# Patient Record
Sex: MALE | Race: WHITE | Employment: FULL TIME | URBAN - NONMETROPOLITAN AREA
[De-identification: names, ages, dates, MRNs, and addresses within clinical notes are randomized per-mention and may not be internally consistent; named-entity substitution may affect disease eponyms.]

---

## 2018-08-24 ENCOUNTER — APPOINTMENT (OUTPATIENT)
Dept: GENERAL RADIOLOGY | Age: 63
DRG: 492 | End: 2018-08-24
Payer: COMMERCIAL

## 2018-08-24 ENCOUNTER — HOSPITAL ENCOUNTER (INPATIENT)
Age: 63
LOS: 6 days | Discharge: NURSING, REHAB OR LONG-TERM CARE | DRG: 492 | End: 2018-08-30
Attending: EMERGENCY MEDICINE | Admitting: SURGERY
Payer: COMMERCIAL
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
## 2018-08-24 PROBLEM — S82.202B OPEN LEFT TIBIAL FRACTURE: Status: ACTIVE | Noted: 2018-08-24

## 2018-08-24 PROBLEM — S92.061B: Status: ACTIVE | Noted: 2018-08-24

## 2018-08-24 PROBLEM — Z79.01 ANTICOAGULATED ON COUMADIN: Status: ACTIVE | Noted: 2018-08-24

## 2018-08-24 PROBLEM — S82.832B: Status: ACTIVE | Noted: 2018-08-24

## 2018-08-24 LAB
ABO: NORMAL
ALBUMIN SERPL-MCNC: 4.2 G/DL (ref 3.5–5.1)
ALP BLD-CCNC: 59 U/L (ref 38–126)
ALT SERPL-CCNC: 26 U/L (ref 11–66)
ANGLE TEG: 70.7 DEG (ref 53–72)
ANION GAP SERPL CALCULATED.3IONS-SCNC: 14 MEQ/L (ref 8–16)
ANTIBODY SCREEN: NORMAL
APTT: 28.1 SECONDS (ref 22–38)
AST SERPL-CCNC: 35 U/L (ref 5–40)
BASOPHILS # BLD: 0.5 %
BASOPHILS ABSOLUTE: 0 THOU/MM3 (ref 0–0.1)
BILIRUB SERPL-MCNC: 0.5 MG/DL (ref 0.3–1.2)
BUN BLDV-MCNC: 41 MG/DL (ref 7–22)
CALCIUM SERPL-MCNC: 8.8 MG/DL (ref 8.5–10.5)
CHLORIDE BLD-SCNC: 104 MEQ/L (ref 98–111)
CO2: 22 MEQ/L (ref 23–33)
CREAT SERPL-MCNC: 1.4 MG/DL (ref 0.4–1.2)
EOSINOPHIL # BLD: 1.2 %
EOSINOPHILS ABSOLUTE: 0.1 THOU/MM3 (ref 0–0.4)
EPL-TEG: 0 %
ERYTHROCYTE [DISTWIDTH] IN BLOOD BY AUTOMATED COUNT: 12.2 % (ref 11.5–14.5)
ERYTHROCYTE [DISTWIDTH] IN BLOOD BY AUTOMATED COUNT: 40 FL (ref 35–45)
ETHYL ALCOHOL, SERUM: < 0.01 %
GFR SERPL CREATININE-BSD FRML MDRD: 51 ML/MIN/1.73M2
GLUCOSE BLD-MCNC: 101 MG/DL (ref 70–108)
HCT VFR BLD CALC: 40 % (ref 42–52)
HEMOGLOBIN: 13.8 GM/DL (ref 14–18)
HEPARIN THERAPY: NO
IMMATURE GRANS (ABS): 0 THOU/MM3 (ref 0–0.07)
IMMATURE GRANULOCYTES: 0 %
INHIBITION AA TEG: 0 %
INHIBITION ADP TEG: 0 %
INR BLD: 1.21 (ref 0.85–1.13)
KINETICS TEG: 1.5 MINUTES (ref 1–3)
LACTIC ACID: 1.4 MMOL/L (ref 0.5–2.2)
LY30 (LYSIS) TEG: 0 % (ref 0–7.5)
LYMPHOCYTES # BLD: 31.8 %
LYMPHOCYTES ABSOLUTE: 1.8 THOU/MM3 (ref 1–4.8)
MA (MAX CLOT) TEG: 61.1 MM (ref 50–70)
MA(AA) TEG: 64.4 MM
MA(ACTIVATED) TEG: 5.1 MM
MA(ADP) TEG: 66 MM
MCH RBC QN AUTO: 31.2 PG (ref 26–33)
MCHC RBC AUTO-ENTMCNC: 34.5 GM/DL (ref 32.2–35.5)
MCV RBC AUTO: 90.3 FL (ref 80–94)
MONOCYTES # BLD: 13.2 %
MONOCYTES ABSOLUTE: 0.8 THOU/MM3 (ref 0.4–1.3)
NUCLEATED RED BLOOD CELLS: 0 /100 WBC
OSMOLALITY CALCULATION: 289.7 MOSMOL/KG (ref 275–300)
PLATELET # BLD: 217 THOU/MM3 (ref 130–400)
PMV BLD AUTO: 8.9 FL (ref 9.4–12.4)
POTASSIUM SERPL-SCNC: 4.8 MEQ/L (ref 3.5–5.2)
RBC # BLD: 4.43 MILL/MM3 (ref 4.7–6.1)
REACTION TIME TEG: 2.7 MINUTES (ref 5–10)
RH FACTOR: NORMAL
SEG NEUTROPHILS: 53.3 %
SEGMENTED NEUTROPHILS ABSOLUTE COUNT: 3.1 THOU/MM3 (ref 1.8–7.7)
SODIUM BLD-SCNC: 140 MEQ/L (ref 135–145)
TOTAL PROTEIN: 6.7 G/DL (ref 6.1–8)
WBC # BLD: 5.8 THOU/MM3 (ref 4.8–10.8)

## 2018-08-24 PROCEDURE — 86900 BLOOD TYPING SEROLOGIC ABO: CPT

## 2018-08-24 PROCEDURE — 96375 TX/PRO/DX INJ NEW DRUG ADDON: CPT

## 2018-08-24 PROCEDURE — 2580000003 HC RX 258

## 2018-08-24 PROCEDURE — 85576 BLOOD PLATELET AGGREGATION: CPT

## 2018-08-24 PROCEDURE — 80053 COMPREHEN METABOLIC PANEL: CPT

## 2018-08-24 PROCEDURE — 86850 RBC ANTIBODY SCREEN: CPT

## 2018-08-24 PROCEDURE — 99222 1ST HOSP IP/OBS MODERATE 55: CPT | Performed by: SURGERY

## 2018-08-24 PROCEDURE — 85730 THROMBOPLASTIN TIME PARTIAL: CPT

## 2018-08-24 PROCEDURE — 2580000003 HC RX 258: Performed by: EMERGENCY MEDICINE

## 2018-08-24 PROCEDURE — 85610 PROTHROMBIN TIME: CPT

## 2018-08-24 PROCEDURE — 96374 THER/PROPH/DIAG INJ IV PUSH: CPT

## 2018-08-24 PROCEDURE — G0480 DRUG TEST DEF 1-7 CLASSES: HCPCS

## 2018-08-24 PROCEDURE — 6360000002 HC RX W HCPCS

## 2018-08-24 PROCEDURE — 96376 TX/PRO/DX INJ SAME DRUG ADON: CPT

## 2018-08-24 PROCEDURE — 73590 X-RAY EXAM OF LOWER LEG: CPT

## 2018-08-24 PROCEDURE — 36415 COLL VENOUS BLD VENIPUNCTURE: CPT

## 2018-08-24 PROCEDURE — 85025 COMPLETE CBC W/AUTO DIFF WBC: CPT

## 2018-08-24 PROCEDURE — 1200000003 HC TELEMETRY R&B

## 2018-08-24 PROCEDURE — 73600 X-RAY EXAM OF ANKLE: CPT

## 2018-08-24 PROCEDURE — 99285 EMERGENCY DEPT VISIT HI MDM: CPT

## 2018-08-24 PROCEDURE — 86901 BLOOD TYPING SEROLOGIC RH(D): CPT

## 2018-08-24 PROCEDURE — 6360000002 HC RX W HCPCS: Performed by: EMERGENCY MEDICINE

## 2018-08-24 PROCEDURE — 90715 TDAP VACCINE 7 YRS/> IM: CPT | Performed by: NURSE PRACTITIONER

## 2018-08-24 PROCEDURE — 83605 ASSAY OF LACTIC ACID: CPT

## 2018-08-24 PROCEDURE — 73620 X-RAY EXAM OF FOOT: CPT

## 2018-08-24 PROCEDURE — 6360000002 HC RX W HCPCS: Performed by: NURSE PRACTITIONER

## 2018-08-24 PROCEDURE — APPSS180 APP SPLIT SHARED TIME > 60 MINUTES: Performed by: NURSE PRACTITIONER

## 2018-08-24 PROCEDURE — 6820000001 HC L2 TRAUMA SURGERY EVALUATION

## 2018-08-24 PROCEDURE — 90471 IMMUNIZATION ADMIN: CPT | Performed by: NURSE PRACTITIONER

## 2018-08-24 PROCEDURE — 81003 URINALYSIS AUTO W/O SCOPE: CPT

## 2018-08-24 PROCEDURE — 93005 ELECTROCARDIOGRAM TRACING: CPT | Performed by: EMERGENCY MEDICINE

## 2018-08-24 RX ORDER — WARFARIN SODIUM 7.5 MG/1
5 TABLET ORAL
COMMUNITY

## 2018-08-24 RX ORDER — FENTANYL CITRATE 50 UG/ML
50 INJECTION, SOLUTION INTRAMUSCULAR; INTRAVENOUS
Status: COMPLETED | OUTPATIENT
Start: 2018-08-24 | End: 2018-08-24

## 2018-08-24 RX ORDER — FENTANYL CITRATE 50 UG/ML
INJECTION, SOLUTION INTRAMUSCULAR; INTRAVENOUS
Status: COMPLETED
Start: 2018-08-24 | End: 2018-08-24

## 2018-08-24 RX ORDER — DEXTROSE MONOHYDRATE 50 MG/ML
INJECTION, SOLUTION INTRAVENOUS
Status: COMPLETED
Start: 2018-08-24 | End: 2018-08-24

## 2018-08-24 RX ORDER — CEFAZOLIN SODIUM 1 G/3ML
INJECTION, POWDER, FOR SOLUTION INTRAMUSCULAR; INTRAVENOUS
Status: COMPLETED
Start: 2018-08-24 | End: 2018-08-24

## 2018-08-24 RX ADMIN — PHYTONADIONE 10 MG: 10 INJECTION, EMULSION INTRAMUSCULAR; INTRAVENOUS; SUBCUTANEOUS at 22:59

## 2018-08-24 RX ADMIN — FENTANYL CITRATE 50 MCG: 50 INJECTION INTRAMUSCULAR; INTRAVENOUS at 22:49

## 2018-08-24 RX ADMIN — TETANUS TOXOID, REDUCED DIPHTHERIA TOXOID AND ACELLULAR PERTUSSIS VACCINE, ADSORBED 0.5 ML: 5; 2.5; 8; 8; 2.5 SUSPENSION INTRAMUSCULAR at 22:13

## 2018-08-24 RX ADMIN — FENTANYL CITRATE 50 MCG: 50 INJECTION INTRAMUSCULAR; INTRAVENOUS at 23:36

## 2018-08-24 RX ADMIN — CEFAZOLIN 1000 MG: 330 INJECTION, POWDER, FOR SOLUTION INTRAMUSCULAR; INTRAVENOUS at 22:02

## 2018-08-24 RX ADMIN — DEXTROSE MONOHYDRATE 50 ML: 50 INJECTION, SOLUTION INTRAVENOUS at 22:02

## 2018-08-24 RX ADMIN — FENTANYL CITRATE 50 MCG: 50 INJECTION INTRAMUSCULAR; INTRAVENOUS at 22:13

## 2018-08-24 ASSESSMENT — ENCOUNTER SYMPTOMS
EYE PAIN: 0
APNEA: 0
SHORTNESS OF BREATH: 0
EYE REDNESS: 0
COLOR CHANGE: 0
CONSTIPATION: 0
WHEEZING: 0
TROUBLE SWALLOWING: 0
SORE THROAT: 0
EYE DISCHARGE: 0
VOMITING: 0
CHOKING: 0
ABDOMINAL DISTENTION: 0
ABDOMINAL PAIN: 0
DIARRHEA: 0
BACK PAIN: 0
BLOOD IN STOOL: 0
COUGH: 0
STRIDOR: 0
EYE ITCHING: 0
NAUSEA: 0
PHOTOPHOBIA: 0
RHINORRHEA: 0
FACIAL SWELLING: 0
SINUS PRESSURE: 0
CHEST TIGHTNESS: 0
VOICE CHANGE: 0

## 2018-08-24 ASSESSMENT — PAIN DESCRIPTION - LOCATION: LOCATION: ANKLE

## 2018-08-24 ASSESSMENT — PAIN SCALES - GENERAL
PAINLEVEL_OUTOF10: 9
PAINLEVEL_OUTOF10: 7
PAINLEVEL_OUTOF10: 6
PAINLEVEL_OUTOF10: 8

## 2018-08-24 ASSESSMENT — PAIN DESCRIPTION - ORIENTATION: ORIENTATION: LEFT;RIGHT

## 2018-08-25 ENCOUNTER — ANESTHESIA EVENT (OUTPATIENT)
Dept: OPERATING ROOM | Age: 63
DRG: 492 | End: 2018-08-25
Payer: COMMERCIAL

## 2018-08-25 ENCOUNTER — APPOINTMENT (OUTPATIENT)
Dept: GENERAL RADIOLOGY | Age: 63
DRG: 492 | End: 2018-08-25
Payer: COMMERCIAL

## 2018-08-25 VITALS
RESPIRATION RATE: 3 BRPM | SYSTOLIC BLOOD PRESSURE: 103 MMHG | OXYGEN SATURATION: 100 % | DIASTOLIC BLOOD PRESSURE: 58 MMHG

## 2018-08-25 PROBLEM — N17.9 AKI (ACUTE KIDNEY INJURY) (HCC): Status: ACTIVE | Noted: 2018-08-25

## 2018-08-25 LAB
AMPHETAMINE+METHAMPHETAMINE URINE SCREEN: NEGATIVE
ANION GAP SERPL CALCULATED.3IONS-SCNC: 14 MEQ/L (ref 8–16)
BARBITURATE QUANTITATIVE URINE: NEGATIVE
BASOPHILS # BLD: 0.2 %
BASOPHILS ABSOLUTE: 0 THOU/MM3 (ref 0–0.1)
BENZODIAZEPINE QUANTITATIVE URINE: NEGATIVE
BILIRUBIN URINE: NEGATIVE
BLOOD, URINE: NEGATIVE
BUN BLDV-MCNC: 35 MG/DL (ref 7–22)
CALCIUM SERPL-MCNC: 8.2 MG/DL (ref 8.5–10.5)
CANNABINOID QUANTITATIVE URINE: NEGATIVE
CHARACTER, URINE: CLEAR
CHLORIDE BLD-SCNC: 105 MEQ/L (ref 98–111)
CO2: 19 MEQ/L (ref 23–33)
COCAINE METABOLITE QUANTITATIVE URINE: NEGATIVE
COLOR: YELLOW
CREAT SERPL-MCNC: 1.2 MG/DL (ref 0.4–1.2)
EKG ATRIAL RATE: 72 BPM
EKG ATRIAL RATE: 74 BPM
EKG P AXIS: 58 DEGREES
EKG P AXIS: 87 DEGREES
EKG P-R INTERVAL: 132 MS
EKG P-R INTERVAL: 138 MS
EKG Q-T INTERVAL: 390 MS
EKG Q-T INTERVAL: 392 MS
EKG QRS DURATION: 90 MS
EKG QRS DURATION: 94 MS
EKG QTC CALCULATION (BAZETT): 427 MS
EKG QTC CALCULATION (BAZETT): 435 MS
EKG R AXIS: -5 DEGREES
EKG R AXIS: 0 DEGREES
EKG T AXIS: -8 DEGREES
EKG T AXIS: 20 DEGREES
EKG VENTRICULAR RATE: 72 BPM
EKG VENTRICULAR RATE: 74 BPM
EOSINOPHIL # BLD: 0.1 %
EOSINOPHILS ABSOLUTE: 0 THOU/MM3 (ref 0–0.4)
ERYTHROCYTE [DISTWIDTH] IN BLOOD BY AUTOMATED COUNT: 12.3 % (ref 11.5–14.5)
ERYTHROCYTE [DISTWIDTH] IN BLOOD BY AUTOMATED COUNT: 42.2 FL (ref 35–45)
GFR SERPL CREATININE-BSD FRML MDRD: 61 ML/MIN/1.73M2
GLUCOSE BLD-MCNC: 106 MG/DL (ref 70–108)
GLUCOSE, URINE: NEGATIVE MG/DL
HCT VFR BLD CALC: 34.7 % (ref 42–52)
HEMOGLOBIN: 11.5 GM/DL (ref 14–18)
IMMATURE GRANS (ABS): 0.02 THOU/MM3 (ref 0–0.07)
IMMATURE GRANULOCYTES: 0.2 %
INR BLD: 1.25 (ref 0.85–1.13)
KETONES, URINE: 40
LEUKOCYTE EST, POC: NEGATIVE
LYMPHOCYTES # BLD: 13 %
LYMPHOCYTES ABSOLUTE: 1.1 THOU/MM3 (ref 1–4.8)
MCH RBC QN AUTO: 31.1 PG (ref 26–33)
MCHC RBC AUTO-ENTMCNC: 33.1 GM/DL (ref 32.2–35.5)
MCV RBC AUTO: 93.8 FL (ref 80–94)
MONOCYTES # BLD: 10.3 %
MONOCYTES ABSOLUTE: 0.8 THOU/MM3 (ref 0.4–1.3)
NITRITE, URINE: NEGATIVE
NUCLEATED RED BLOOD CELLS: 0 /100 WBC
OPIATES, URINE: NEGATIVE
OXYCODONE: NEGATIVE
PH UA: 5
PHENCYCLIDINE QUANTITATIVE URINE: NEGATIVE
PLATELET # BLD: 198 THOU/MM3 (ref 130–400)
PMV BLD AUTO: 9.5 FL (ref 9.4–12.4)
POTASSIUM REFLEX MAGNESIUM: 4.7 MEQ/L (ref 3.5–5.2)
PROTEIN UA: NEGATIVE MG/DL
RBC # BLD: 3.7 MILL/MM3 (ref 4.7–6.1)
SEG NEUTROPHILS: 76.2 %
SEGMENTED NEUTROPHILS ABSOLUTE COUNT: 6.2 THOU/MM3 (ref 1.8–7.7)
SODIUM BLD-SCNC: 138 MEQ/L (ref 135–145)
SPECIFIC GRAVITY UA: 1.02 (ref 1–1.03)
TOTAL CK: 566 U/L (ref 55–170)
TROPONIN T: < 0.01 NG/ML
UROBILINOGEN, URINE: 0.2 EU/DL
WBC # BLD: 8.1 THOU/MM3 (ref 4.8–10.8)

## 2018-08-25 PROCEDURE — 36415 COLL VENOUS BLD VENIPUNCTURE: CPT

## 2018-08-25 PROCEDURE — 6370000000 HC RX 637 (ALT 250 FOR IP): Performed by: SURGERY

## 2018-08-25 PROCEDURE — 3700000001 HC ADD 15 MINUTES (ANESTHESIA): Performed by: PODIATRIST

## 2018-08-25 PROCEDURE — 6360000002 HC RX W HCPCS: Performed by: STUDENT IN AN ORGANIZED HEALTH CARE EDUCATION/TRAINING PROGRAM

## 2018-08-25 PROCEDURE — C1713 ANCHOR/SCREW BN/BN,TIS/BN: HCPCS | Performed by: PODIATRIST

## 2018-08-25 PROCEDURE — 2580000003 HC RX 258: Performed by: STUDENT IN AN ORGANIZED HEALTH CARE EDUCATION/TRAINING PROGRAM

## 2018-08-25 PROCEDURE — 2709999900 HC NON-CHARGEABLE SUPPLY: Performed by: PODIATRIST

## 2018-08-25 PROCEDURE — 84484 ASSAY OF TROPONIN QUANT: CPT

## 2018-08-25 PROCEDURE — 3600000004 HC SURGERY LEVEL 4 BASE: Performed by: PODIATRIST

## 2018-08-25 PROCEDURE — 85610 PROTHROMBIN TIME: CPT

## 2018-08-25 PROCEDURE — 3209999900 FLUORO FOR SURGICAL PROCEDURES

## 2018-08-25 PROCEDURE — 0QSL04Z REPOSITION RIGHT TARSAL WITH INTERNAL FIXATION DEVICE, OPEN APPROACH: ICD-10-PCS | Performed by: PODIATRIST

## 2018-08-25 PROCEDURE — 85025 COMPLETE CBC W/AUTO DIFF WBC: CPT

## 2018-08-25 PROCEDURE — 71045 X-RAY EXAM CHEST 1 VIEW: CPT

## 2018-08-25 PROCEDURE — 0QSH05Z REPOSITION LEFT TIBIA WITH EXTERNAL FIXATION DEVICE, OPEN APPROACH: ICD-10-PCS | Performed by: PODIATRIST

## 2018-08-25 PROCEDURE — 6360000002 HC RX W HCPCS: Performed by: ANESTHESIOLOGY

## 2018-08-25 PROCEDURE — 73650 X-RAY EXAM OF HEEL: CPT

## 2018-08-25 PROCEDURE — 80307 DRUG TEST PRSMV CHEM ANLYZR: CPT

## 2018-08-25 PROCEDURE — 06QQ0ZZ REPAIR LEFT SAPHENOUS VEIN, OPEN APPROACH: ICD-10-PCS | Performed by: PODIATRIST

## 2018-08-25 PROCEDURE — 7100000001 HC PACU RECOVERY - ADDTL 15 MIN: Performed by: PODIATRIST

## 2018-08-25 PROCEDURE — 73600 X-RAY EXAM OF ANKLE: CPT

## 2018-08-25 PROCEDURE — 93010 ELECTROCARDIOGRAM REPORT: CPT | Performed by: INTERNAL MEDICINE

## 2018-08-25 PROCEDURE — 3700000000 HC ANESTHESIA ATTENDED CARE: Performed by: PODIATRIST

## 2018-08-25 PROCEDURE — 2720000010 HC SURG SUPPLY STERILE: Performed by: PODIATRIST

## 2018-08-25 PROCEDURE — 2709999900 HC NON-CHARGEABLE SUPPLY

## 2018-08-25 PROCEDURE — 7100000000 HC PACU RECOVERY - FIRST 15 MIN: Performed by: PODIATRIST

## 2018-08-25 PROCEDURE — 2500000003 HC RX 250 WO HCPCS: Performed by: ANESTHESIOLOGY

## 2018-08-25 PROCEDURE — 0JQQ0ZZ REPAIR RIGHT FOOT SUBCUTANEOUS TISSUE AND FASCIA, OPEN APPROACH: ICD-10-PCS | Performed by: PODIATRIST

## 2018-08-25 PROCEDURE — 82550 ASSAY OF CK (CPK): CPT

## 2018-08-25 PROCEDURE — 1200000003 HC TELEMETRY R&B

## 2018-08-25 PROCEDURE — 80048 BASIC METABOLIC PNL TOTAL CA: CPT

## 2018-08-25 PROCEDURE — 99252 IP/OBS CONSLTJ NEW/EST SF 35: CPT | Performed by: FAMILY MEDICINE

## 2018-08-25 PROCEDURE — 93005 ELECTROCARDIOGRAM TRACING: CPT | Performed by: STUDENT IN AN ORGANIZED HEALTH CARE EDUCATION/TRAINING PROGRAM

## 2018-08-25 PROCEDURE — 2500000003 HC RX 250 WO HCPCS: Performed by: NURSE ANESTHETIST, CERTIFIED REGISTERED

## 2018-08-25 PROCEDURE — 6370000000 HC RX 637 (ALT 250 FOR IP): Performed by: STUDENT IN AN ORGANIZED HEALTH CARE EDUCATION/TRAINING PROGRAM

## 2018-08-25 PROCEDURE — 6370000000 HC RX 637 (ALT 250 FOR IP): Performed by: NURSE PRACTITIONER

## 2018-08-25 PROCEDURE — 3600000014 HC SURGERY LEVEL 4 ADDTL 15MIN: Performed by: PODIATRIST

## 2018-08-25 PROCEDURE — 2500000003 HC RX 250 WO HCPCS: Performed by: PODIATRIST

## 2018-08-25 PROCEDURE — 72100 X-RAY EXAM L-S SPINE 2/3 VWS: CPT

## 2018-08-25 PROCEDURE — 99232 SBSQ HOSP IP/OBS MODERATE 35: CPT | Performed by: SURGERY

## 2018-08-25 PROCEDURE — 6360000002 HC RX W HCPCS: Performed by: SURGERY

## 2018-08-25 PROCEDURE — 2580000003 HC RX 258: Performed by: PODIATRIST

## 2018-08-25 PROCEDURE — 6360000002 HC RX W HCPCS: Performed by: PODIATRIST

## 2018-08-25 PROCEDURE — 6360000002 HC RX W HCPCS

## 2018-08-25 DEVICE — 4.0MM PARTIALLY THREADED                                    CANNULATED SCREW 40MM: Type: IMPLANTABLE DEVICE | Status: FUNCTIONAL

## 2018-08-25 DEVICE — JET-X FREEDOM POST
Type: IMPLANTABLE DEVICE | Status: FUNCTIONAL
Brand: JET-X

## 2018-08-25 DEVICE — 4.0MM PARTIALLY THREADED                                    CANNULATED SCREW 38MM: Type: IMPLANTABLE DEVICE | Status: FUNCTIONAL

## 2018-08-25 RX ORDER — DIPHENHYDRAMINE HYDROCHLORIDE 50 MG/ML
12.5 INJECTION INTRAMUSCULAR; INTRAVENOUS
Status: DISCONTINUED | OUTPATIENT
Start: 2018-08-25 | End: 2018-08-25 | Stop reason: HOSPADM

## 2018-08-25 RX ORDER — FENTANYL CITRATE 50 UG/ML
50 INJECTION, SOLUTION INTRAMUSCULAR; INTRAVENOUS EVERY 5 MIN PRN
Status: DISCONTINUED | OUTPATIENT
Start: 2018-08-25 | End: 2018-08-25 | Stop reason: HOSPADM

## 2018-08-25 RX ORDER — SODIUM CHLORIDE 0.9 % (FLUSH) 0.9 %
10 SYRINGE (ML) INJECTION EVERY 12 HOURS SCHEDULED
Status: DISCONTINUED | OUTPATIENT
Start: 2018-08-25 | End: 2018-08-25 | Stop reason: SDUPTHER

## 2018-08-25 RX ORDER — SENNA PLUS 8.6 MG/1
2 TABLET ORAL NIGHTLY
Status: DISCONTINUED | OUTPATIENT
Start: 2018-08-25 | End: 2018-08-27

## 2018-08-25 RX ORDER — SODIUM CHLORIDE 9 MG/ML
INJECTION, SOLUTION INTRAVENOUS CONTINUOUS
Status: DISCONTINUED | OUTPATIENT
Start: 2018-08-25 | End: 2018-08-25 | Stop reason: SDUPTHER

## 2018-08-25 RX ORDER — SODIUM CHLORIDE 0.9 % (FLUSH) 0.9 %
10 SYRINGE (ML) INJECTION PRN
Status: DISCONTINUED | OUTPATIENT
Start: 2018-08-25 | End: 2018-08-30 | Stop reason: HOSPADM

## 2018-08-25 RX ORDER — CEFAZOLIN SODIUM 1 G/3ML
INJECTION, POWDER, FOR SOLUTION INTRAMUSCULAR; INTRAVENOUS PRN
Status: DISCONTINUED | OUTPATIENT
Start: 2018-08-25 | End: 2018-08-25 | Stop reason: SDUPTHER

## 2018-08-25 RX ORDER — FENTANYL CITRATE 50 UG/ML
INJECTION, SOLUTION INTRAMUSCULAR; INTRAVENOUS
Status: COMPLETED
Start: 2018-08-25 | End: 2018-08-25

## 2018-08-25 RX ORDER — ONDANSETRON 2 MG/ML
4 INJECTION INTRAMUSCULAR; INTRAVENOUS EVERY 6 HOURS PRN
Status: DISCONTINUED | OUTPATIENT
Start: 2018-08-25 | End: 2018-08-25 | Stop reason: SDUPTHER

## 2018-08-25 RX ORDER — BUPIVACAINE HYDROCHLORIDE AND EPINEPHRINE 5; 5 MG/ML; UG/ML
INJECTION, SOLUTION EPIDURAL; INTRACAUDAL; PERINEURAL PRN
Status: DISCONTINUED | OUTPATIENT
Start: 2018-08-25 | End: 2018-08-25 | Stop reason: HOSPADM

## 2018-08-25 RX ORDER — LIDOCAINE HYDROCHLORIDE 10 MG/ML
INJECTION, SOLUTION EPIDURAL; INFILTRATION; INTRACAUDAL; PERINEURAL
Status: DISPENSED
Start: 2018-08-25 | End: 2018-08-25

## 2018-08-25 RX ORDER — FAMOTIDINE 20 MG/1
20 TABLET, FILM COATED ORAL 2 TIMES DAILY
Status: DISCONTINUED | OUTPATIENT
Start: 2018-08-25 | End: 2018-08-30 | Stop reason: HOSPADM

## 2018-08-25 RX ORDER — ONDANSETRON 2 MG/ML
4 INJECTION INTRAMUSCULAR; INTRAVENOUS
Status: DISCONTINUED | OUTPATIENT
Start: 2018-08-25 | End: 2018-08-25 | Stop reason: HOSPADM

## 2018-08-25 RX ORDER — PROPOFOL 10 MG/ML
INJECTION, EMULSION INTRAVENOUS PRN
Status: DISCONTINUED | OUTPATIENT
Start: 2018-08-25 | End: 2018-08-25 | Stop reason: SDUPTHER

## 2018-08-25 RX ORDER — MEPERIDINE HYDROCHLORIDE 25 MG/ML
12.5 INJECTION INTRAMUSCULAR; INTRAVENOUS; SUBCUTANEOUS EVERY 5 MIN PRN
Status: DISCONTINUED | OUTPATIENT
Start: 2018-08-25 | End: 2018-08-25 | Stop reason: HOSPADM

## 2018-08-25 RX ORDER — ROCURONIUM BROMIDE 10 MG/ML
INJECTION, SOLUTION INTRAVENOUS PRN
Status: DISCONTINUED | OUTPATIENT
Start: 2018-08-25 | End: 2018-08-25 | Stop reason: SDUPTHER

## 2018-08-25 RX ORDER — ACETAMINOPHEN 325 MG/1
650 TABLET ORAL EVERY 4 HOURS PRN
Status: DISCONTINUED | OUTPATIENT
Start: 2018-08-25 | End: 2018-08-30 | Stop reason: HOSPADM

## 2018-08-25 RX ORDER — CYCLOBENZAPRINE HCL 10 MG
10 TABLET ORAL 3 TIMES DAILY PRN
Status: DISCONTINUED | OUTPATIENT
Start: 2018-08-25 | End: 2018-08-27

## 2018-08-25 RX ORDER — FENTANYL CITRATE 50 UG/ML
25 INJECTION, SOLUTION INTRAMUSCULAR; INTRAVENOUS
Status: DISCONTINUED | OUTPATIENT
Start: 2018-08-25 | End: 2018-08-30 | Stop reason: HOSPADM

## 2018-08-25 RX ORDER — SODIUM CHLORIDE 0.9 % (FLUSH) 0.9 %
10 SYRINGE (ML) INJECTION PRN
Status: DISCONTINUED | OUTPATIENT
Start: 2018-08-25 | End: 2018-08-25 | Stop reason: SDUPTHER

## 2018-08-25 RX ORDER — MIDAZOLAM HYDROCHLORIDE 1 MG/ML
INJECTION INTRAMUSCULAR; INTRAVENOUS PRN
Status: DISCONTINUED | OUTPATIENT
Start: 2018-08-25 | End: 2018-08-25 | Stop reason: SDUPTHER

## 2018-08-25 RX ORDER — SODIUM CHLORIDE 9 MG/ML
INJECTION, SOLUTION INTRAVENOUS CONTINUOUS PRN
Status: DISCONTINUED | OUTPATIENT
Start: 2018-08-25 | End: 2018-08-25

## 2018-08-25 RX ORDER — LABETALOL HYDROCHLORIDE 5 MG/ML
5 INJECTION, SOLUTION INTRAVENOUS EVERY 5 MIN PRN
Status: DISCONTINUED | OUTPATIENT
Start: 2018-08-25 | End: 2018-08-25 | Stop reason: HOSPADM

## 2018-08-25 RX ORDER — SODIUM CHLORIDE 0.9 % (FLUSH) 0.9 %
10 SYRINGE (ML) INJECTION EVERY 12 HOURS SCHEDULED
Status: DISCONTINUED | OUTPATIENT
Start: 2018-08-25 | End: 2018-08-30 | Stop reason: HOSPADM

## 2018-08-25 RX ORDER — FENTANYL CITRATE 50 UG/ML
50 INJECTION, SOLUTION INTRAMUSCULAR; INTRAVENOUS
Status: DISCONTINUED | OUTPATIENT
Start: 2018-08-25 | End: 2018-08-30 | Stop reason: HOSPADM

## 2018-08-25 RX ORDER — OXYCODONE HYDROCHLORIDE AND ACETAMINOPHEN 5; 325 MG/1; MG/1
2 TABLET ORAL EVERY 4 HOURS PRN
Status: DISCONTINUED | OUTPATIENT
Start: 2018-08-25 | End: 2018-08-30 | Stop reason: HOSPADM

## 2018-08-25 RX ORDER — EPHEDRINE SULFATE 50 MG/ML
INJECTION INTRAVENOUS PRN
Status: DISCONTINUED | OUTPATIENT
Start: 2018-08-25 | End: 2018-08-25 | Stop reason: SDUPTHER

## 2018-08-25 RX ORDER — FENTANYL CITRATE 50 UG/ML
INJECTION, SOLUTION INTRAMUSCULAR; INTRAVENOUS PRN
Status: DISCONTINUED | OUTPATIENT
Start: 2018-08-25 | End: 2018-08-25 | Stop reason: SDUPTHER

## 2018-08-25 RX ORDER — BISACODYL 10 MG
10 SUPPOSITORY, RECTAL RECTAL DAILY PRN
Status: DISCONTINUED | OUTPATIENT
Start: 2018-08-25 | End: 2018-08-30 | Stop reason: HOSPADM

## 2018-08-25 RX ORDER — OXYCODONE HYDROCHLORIDE AND ACETAMINOPHEN 5; 325 MG/1; MG/1
2 TABLET ORAL EVERY 4 HOURS PRN
Status: DISCONTINUED | OUTPATIENT
Start: 2018-08-25 | End: 2018-08-25 | Stop reason: SDUPTHER

## 2018-08-25 RX ORDER — ONDANSETRON 2 MG/ML
4 INJECTION INTRAMUSCULAR; INTRAVENOUS EVERY 6 HOURS PRN
Status: DISCONTINUED | OUTPATIENT
Start: 2018-08-25 | End: 2018-08-30 | Stop reason: HOSPADM

## 2018-08-25 RX ORDER — GENTAMICIN SULFATE 80 MG/100ML
80 INJECTION, SOLUTION INTRAVENOUS EVERY 12 HOURS
Status: DISCONTINUED | OUTPATIENT
Start: 2018-08-25 | End: 2018-08-25

## 2018-08-25 RX ORDER — DOCUSATE SODIUM 100 MG/1
100 CAPSULE, LIQUID FILLED ORAL 2 TIMES DAILY
Status: DISCONTINUED | OUTPATIENT
Start: 2018-08-25 | End: 2018-08-25 | Stop reason: SDUPTHER

## 2018-08-25 RX ORDER — POLYETHYLENE GLYCOL 3350 17 G/17G
17 POWDER, FOR SOLUTION ORAL DAILY
Status: DISCONTINUED | OUTPATIENT
Start: 2018-08-25 | End: 2018-08-27

## 2018-08-25 RX ORDER — OXYCODONE HYDROCHLORIDE AND ACETAMINOPHEN 5; 325 MG/1; MG/1
1 TABLET ORAL EVERY 4 HOURS PRN
Status: DISCONTINUED | OUTPATIENT
Start: 2018-08-25 | End: 2018-08-30 | Stop reason: HOSPADM

## 2018-08-25 RX ORDER — HYDRALAZINE HYDROCHLORIDE 20 MG/ML
5 INJECTION INTRAMUSCULAR; INTRAVENOUS EVERY 10 MIN PRN
Status: DISCONTINUED | OUTPATIENT
Start: 2018-08-25 | End: 2018-08-25 | Stop reason: HOSPADM

## 2018-08-25 RX ORDER — DOCUSATE SODIUM 100 MG/1
100 CAPSULE, LIQUID FILLED ORAL 2 TIMES DAILY
Status: DISCONTINUED | OUTPATIENT
Start: 2018-08-25 | End: 2018-08-27

## 2018-08-25 RX ORDER — SODIUM CHLORIDE 9 MG/ML
INJECTION, SOLUTION INTRAVENOUS CONTINUOUS
Status: DISCONTINUED | OUTPATIENT
Start: 2018-08-25 | End: 2018-08-30 | Stop reason: HOSPADM

## 2018-08-25 RX ORDER — ATORVASTATIN CALCIUM 40 MG/1
40 TABLET, FILM COATED ORAL DAILY
Status: DISCONTINUED | OUTPATIENT
Start: 2018-08-26 | End: 2018-08-30 | Stop reason: HOSPADM

## 2018-08-25 RX ADMIN — SODIUM CHLORIDE, PRESERVATIVE FREE 10 ML: 5 INJECTION INTRAVENOUS at 10:00

## 2018-08-25 RX ADMIN — GENTAMICIN SULFATE 80 MG: 80 INJECTION, SOLUTION INTRAVENOUS at 04:34

## 2018-08-25 RX ADMIN — CYCLOBENZAPRINE 10 MG: 10 TABLET, FILM COATED ORAL at 09:57

## 2018-08-25 RX ADMIN — FENTANYL CITRATE 50 MCG: 50 INJECTION INTRAMUSCULAR; INTRAVENOUS at 18:27

## 2018-08-25 RX ADMIN — FENTANYL CITRATE 50 MCG: 50 INJECTION INTRAMUSCULAR; INTRAVENOUS at 11:01

## 2018-08-25 RX ADMIN — MIDAZOLAM HYDROCHLORIDE 2 MG: 1 INJECTION, SOLUTION INTRAMUSCULAR; INTRAVENOUS at 19:34

## 2018-08-25 RX ADMIN — OXYCODONE HYDROCHLORIDE AND ACETAMINOPHEN 2 TABLET: 5; 325 TABLET ORAL at 17:07

## 2018-08-25 RX ADMIN — Medication 2 G: at 06:39

## 2018-08-25 RX ADMIN — Medication 2 G: at 13:40

## 2018-08-25 RX ADMIN — FENTANYL CITRATE 50 MCG: 50 INJECTION INTRAMUSCULAR; INTRAVENOUS at 13:34

## 2018-08-25 RX ADMIN — PROPOFOL 110 MG: 10 INJECTION, EMULSION INTRAVENOUS at 19:37

## 2018-08-25 RX ADMIN — FENTANYL CITRATE 50 MCG: 50 INJECTION INTRAMUSCULAR; INTRAVENOUS at 01:59

## 2018-08-25 RX ADMIN — FENTANYL CITRATE 50 MCG: 50 INJECTION INTRAMUSCULAR; INTRAVENOUS at 15:54

## 2018-08-25 RX ADMIN — FENTANYL CITRATE 50 MCG: 50 INJECTION INTRAMUSCULAR; INTRAVENOUS at 04:31

## 2018-08-25 RX ADMIN — PHENYLEPHRINE HYDROCHLORIDE 100 MCG: 10 INJECTION INTRAVENOUS at 20:44

## 2018-08-25 RX ADMIN — CEFAZOLIN 2000 MG: 1 INJECTION, POWDER, FOR SOLUTION INTRAMUSCULAR; INTRAVENOUS; PARENTERAL at 19:45

## 2018-08-25 RX ADMIN — ONDANSETRON 4 MG: 2 INJECTION INTRAMUSCULAR; INTRAVENOUS at 23:14

## 2018-08-25 RX ADMIN — PHENYLEPHRINE HYDROCHLORIDE 100 MCG: 10 INJECTION INTRAVENOUS at 21:17

## 2018-08-25 RX ADMIN — PHENYLEPHRINE HYDROCHLORIDE 100 MCG: 10 INJECTION INTRAVENOUS at 20:25

## 2018-08-25 RX ADMIN — EPHEDRINE SULFATE 10 MG: 50 INJECTION, SOLUTION INTRAVENOUS at 20:51

## 2018-08-25 RX ADMIN — FENTANYL CITRATE 100 MCG: 50 INJECTION INTRAMUSCULAR; INTRAVENOUS at 20:35

## 2018-08-25 RX ADMIN — FAMOTIDINE 20 MG: 20 TABLET ORAL at 08:55

## 2018-08-25 RX ADMIN — PHENYLEPHRINE HYDROCHLORIDE 200 MCG: 10 INJECTION INTRAVENOUS at 20:53

## 2018-08-25 RX ADMIN — OXYCODONE HYDROCHLORIDE AND ACETAMINOPHEN 2 TABLET: 5; 325 TABLET ORAL at 12:20

## 2018-08-25 RX ADMIN — OXYCODONE HYDROCHLORIDE AND ACETAMINOPHEN 2 TABLET: 5; 325 TABLET ORAL at 03:20

## 2018-08-25 RX ADMIN — FENTANYL CITRATE 100 MCG: 50 INJECTION INTRAMUSCULAR; INTRAVENOUS at 19:34

## 2018-08-25 RX ADMIN — FENTANYL CITRATE 50 MCG: 50 INJECTION INTRAMUSCULAR; INTRAVENOUS at 00:27

## 2018-08-25 RX ADMIN — PHENYLEPHRINE HYDROCHLORIDE 100 MCG: 10 INJECTION INTRAVENOUS at 20:33

## 2018-08-25 RX ADMIN — FENTANYL CITRATE 50 MCG: 50 INJECTION INTRAMUSCULAR; INTRAVENOUS at 23:13

## 2018-08-25 RX ADMIN — SODIUM CHLORIDE: 9 INJECTION, SOLUTION INTRAVENOUS at 10:59

## 2018-08-25 RX ADMIN — DOCUSATE SODIUM 100 MG: 100 CAPSULE, LIQUID FILLED ORAL at 08:55

## 2018-08-25 RX ADMIN — FENTANYL CITRATE 50 MCG: 50 INJECTION INTRAMUSCULAR; INTRAVENOUS at 08:45

## 2018-08-25 RX ADMIN — GENTAMICIN SULFATE 80 MG: 80 INJECTION, SOLUTION INTRAVENOUS at 15:53

## 2018-08-25 RX ADMIN — SODIUM CHLORIDE: 9 INJECTION, SOLUTION INTRAVENOUS at 20:20

## 2018-08-25 RX ADMIN — FENTANYL CITRATE 50 MCG: 50 INJECTION INTRAMUSCULAR; INTRAVENOUS at 20:07

## 2018-08-25 RX ADMIN — FENTANYL CITRATE 50 MCG: 50 INJECTION INTRAMUSCULAR; INTRAVENOUS at 06:35

## 2018-08-25 RX ADMIN — PHENYLEPHRINE HYDROCHLORIDE 200 MCG: 10 INJECTION INTRAVENOUS at 20:48

## 2018-08-25 RX ADMIN — ROCURONIUM BROMIDE 50 MG: 10 INJECTION INTRAVENOUS at 19:37

## 2018-08-25 ASSESSMENT — PULMONARY FUNCTION TESTS
PIF_VALUE: 15
PIF_VALUE: 16
PIF_VALUE: 5
PIF_VALUE: 15
PIF_VALUE: 20
PIF_VALUE: 19
PIF_VALUE: 19
PIF_VALUE: 15
PIF_VALUE: 17
PIF_VALUE: 18
PIF_VALUE: 18
PIF_VALUE: 15
PIF_VALUE: 15
PIF_VALUE: 14
PIF_VALUE: 18
PIF_VALUE: 16
PIF_VALUE: 18
PIF_VALUE: 3
PIF_VALUE: 0
PIF_VALUE: 16
PIF_VALUE: 16
PIF_VALUE: 14
PIF_VALUE: 4
PIF_VALUE: 20
PIF_VALUE: 15
PIF_VALUE: 20
PIF_VALUE: 16
PIF_VALUE: 20
PIF_VALUE: 18
PIF_VALUE: 18
PIF_VALUE: 15
PIF_VALUE: 16
PIF_VALUE: 14
PIF_VALUE: 15
PIF_VALUE: 16
PIF_VALUE: 15
PIF_VALUE: 15
PIF_VALUE: 18
PIF_VALUE: 16
PIF_VALUE: 14
PIF_VALUE: 16
PIF_VALUE: 18
PIF_VALUE: 18
PIF_VALUE: 3
PIF_VALUE: 20
PIF_VALUE: 15
PIF_VALUE: 14
PIF_VALUE: 12
PIF_VALUE: 22
PIF_VALUE: 18
PIF_VALUE: 4
PIF_VALUE: 18
PIF_VALUE: 18
PIF_VALUE: 11
PIF_VALUE: 16
PIF_VALUE: 18
PIF_VALUE: 15
PIF_VALUE: 14
PIF_VALUE: 23
PIF_VALUE: 14
PIF_VALUE: 14
PIF_VALUE: 16
PIF_VALUE: 20
PIF_VALUE: 18
PIF_VALUE: 18
PIF_VALUE: 12
PIF_VALUE: 20
PIF_VALUE: 16
PIF_VALUE: 19
PIF_VALUE: 12
PIF_VALUE: 17
PIF_VALUE: 15
PIF_VALUE: 20
PIF_VALUE: 15
PIF_VALUE: 4
PIF_VALUE: 15
PIF_VALUE: 18
PIF_VALUE: 18
PIF_VALUE: 5
PIF_VALUE: 18
PIF_VALUE: 16
PIF_VALUE: 16
PIF_VALUE: 15
PIF_VALUE: 18
PIF_VALUE: 20
PIF_VALUE: 16
PIF_VALUE: 20
PIF_VALUE: 16
PIF_VALUE: 11
PIF_VALUE: 4
PIF_VALUE: 15
PIF_VALUE: 12
PIF_VALUE: 3
PIF_VALUE: 0
PIF_VALUE: 3
PIF_VALUE: 15
PIF_VALUE: 23
PIF_VALUE: 11
PIF_VALUE: 4
PIF_VALUE: 12
PIF_VALUE: 15
PIF_VALUE: 15
PIF_VALUE: 14
PIF_VALUE: 20
PIF_VALUE: 15
PIF_VALUE: 10
PIF_VALUE: 1
PIF_VALUE: 17
PIF_VALUE: 17
PIF_VALUE: 22
PIF_VALUE: 1
PIF_VALUE: 16
PIF_VALUE: 2
PIF_VALUE: 15
PIF_VALUE: 4
PIF_VALUE: 4
PIF_VALUE: 14
PIF_VALUE: 16
PIF_VALUE: 16
PIF_VALUE: 19
PIF_VALUE: 17
PIF_VALUE: 19
PIF_VALUE: 3
PIF_VALUE: 18
PIF_VALUE: 15
PIF_VALUE: 15
PIF_VALUE: 20
PIF_VALUE: 16
PIF_VALUE: 14
PIF_VALUE: 3
PIF_VALUE: 5
PIF_VALUE: 15
PIF_VALUE: 14
PIF_VALUE: 18
PIF_VALUE: 12
PIF_VALUE: 15
PIF_VALUE: 14
PIF_VALUE: 11
PIF_VALUE: 4
PIF_VALUE: 11

## 2018-08-25 ASSESSMENT — PAIN SCALES - GENERAL
PAINLEVEL_OUTOF10: 7
PAINLEVEL_OUTOF10: 7
PAINLEVEL_OUTOF10: 8
PAINLEVEL_OUTOF10: 6
PAINLEVEL_OUTOF10: 7
PAINLEVEL_OUTOF10: 8
PAINLEVEL_OUTOF10: 4
PAINLEVEL_OUTOF10: 7
PAINLEVEL_OUTOF10: 8
PAINLEVEL_OUTOF10: 7
PAINLEVEL_OUTOF10: 7
PAINLEVEL_OUTOF10: 8
PAINLEVEL_OUTOF10: 7

## 2018-08-25 ASSESSMENT — PAIN DESCRIPTION - PROGRESSION
CLINICAL_PROGRESSION: GRADUALLY WORSENING

## 2018-08-25 ASSESSMENT — PAIN DESCRIPTION - ORIENTATION
ORIENTATION: LEFT;RIGHT

## 2018-08-25 ASSESSMENT — PAIN DESCRIPTION - FREQUENCY
FREQUENCY: INTERMITTENT
FREQUENCY: CONTINUOUS

## 2018-08-25 ASSESSMENT — PAIN DESCRIPTION - DESCRIPTORS
DESCRIPTORS: ACHING;THROBBING
DESCRIPTORS: ACHING;THROBBING
DESCRIPTORS: ACHING

## 2018-08-25 ASSESSMENT — PAIN DESCRIPTION - LOCATION
LOCATION: LEG;ANKLE
LOCATION: ANKLE
LOCATION: ANKLE

## 2018-08-25 ASSESSMENT — PAIN DESCRIPTION - ONSET
ONSET: ON-GOING
ONSET: ON-GOING

## 2018-08-25 ASSESSMENT — PAIN DESCRIPTION - PAIN TYPE
TYPE: ACUTE PAIN

## 2018-08-25 ASSESSMENT — PAIN SCALES - WONG BAKER: WONGBAKER_NUMERICALRESPONSE: 0

## 2018-08-25 NOTE — ED PROVIDER NOTES
past surgical history that includes hip surgery. CURRENT MEDICATIONS       Previous Medications    ATORVASTATIN CALCIUM (LIPITOR PO)    Take by mouth    WARFARIN (COUMADIN) 7.5 MG TABLET    Take 7.5 mg by mouth       ALLERGIES     is allergic to morphine. FAMILY HISTORY     has no family status information on file. family history is not on file. SOCIAL HISTORY      reports that he has never smoked. He has never used smokeless tobacco. He reports that he does not drink alcohol or use drugs. PHYSICAL EXAM       ED Triage Vitals   BP Temp Temp src Pulse Resp SpO2 Height Weight   08/24/18 2200 08/24/18 2159 -- 08/24/18 2200 08/24/18 2200 08/24/18 2200 08/24/18 2159 08/24/18 2159   (!) 159/86 98.4 °F (36.9 °C)  72 14 99 % 5' 4\" (1.626 m) 175 lb (79.4 kg)      Physical Exam   Constitutional: He is oriented to person, place, and time. Vital signs are normal. He appears well-developed and well-nourished. He is cooperative. Non-toxic appearance. He does not appear ill. HENT:   Head: Normocephalic. Right Ear: External ear normal. No drainage. Left Ear: External ear normal. No drainage. Nose: Nose normal. No epistaxis. Mouth/Throat: Mucous membranes are not dry and not cyanotic. Eyes: Conjunctivae and EOM are normal. Right eye exhibits no discharge. Left eye exhibits no discharge. No scleral icterus. Neck: Trachea normal, normal range of motion and phonation normal. Neck supple. No tracheal deviation present. Cardiovascular: Normal rate, regular rhythm, normal heart sounds and intact distal pulses. Exam reveals no gallop and no friction rub. No murmur heard. Pulses:       Radial pulses are 2+ on the right side. Pulmonary/Chest: Effort normal and breath sounds normal. No stridor. No respiratory distress. He has no wheezes. He has no rales. He exhibits no tenderness. Abdominal: Soft. Bowel sounds are normal. He exhibits no distension and no pulsatile midline mass. There is no tenderness. There is no rebound and no guarding. Musculoskeletal: He exhibits no edema or tenderness (No calf pain or tenderness. No evidence of DVT). Right ankle: He exhibits decreased range of motion, swelling, deformity and laceration. Left ankle: He exhibits decreased range of motion, swelling, deformity and laceration. Neurological: He is alert and oriented to person, place, and time. He has normal strength. He displays no tremor. He displays no seizure activity. Coordination normal. GCS eye subscore is 4. GCS verbal subscore is 5. GCS motor subscore is 6. Skin: Skin is warm and dry. No rash (on exposed surfaced) noted. He is not diaphoretic. No cyanosis or erythema. No pallor. Psychiatric: He has a normal mood and affect. His speech is normal and behavior is normal.   Nursing note and vitals reviewed. DIFFERENTIAL DIAGNOSIS:   Bilateral open ankle/tib-fib fractures complicated by long-term anticoagulation. DIAGNOSTIC RESULTS     EKG: All EKG's are interpreted by the Emergency Department Physician who either signs or Co-signs this chart in the absence of a cardiologist.    EKG interpreted by me shows a normal sinus rhythm with a ventricular rate of 72 bpm.  AZ intervals 132 ms. QRS duration is 90 ms. There are axis is 0. No ST elevation MI. Normal EKG. RADIOLOGY: non-plain film images(s) such as CT, Ultrasound and MRI are read by the radiologist.    XR ANKLE RIGHT (2 VIEWS)   Final Result   Addendum 1 of 1   ADDENDUM #1      The questioned intra-articular fracture of the distal calcaneus at the    calcaneocuboid joint is artifactual when correlated with the accompanying    right foot series. No fracture is seen on that study. Final report electronically signed by Dr. Neo Guerrero on 8/24/2018    11:01 PM      ORIGINAL REPORT   PROCEDURE: XR ANKLE RIGHT (2 VIEWS)      CLINICAL INFORMATION: Trauma, . COMPARISON: No prior study.       TECHNIQUE: AP and lateral views of the right 08/24/18 2322 08/25/18 0012   BP:  (!) 159/86 102/65 118/72   Pulse:  72  69   Resp:  14  16   Temp:       SpO2:  99%  97%   Weight: 175 lb (79.4 kg)      Height: 5' 4\" (1.626 m)          Orders Placed This Encounter   Medications    ceFAZolin (ANCEF) 1 g injection     Sara Wang: cabinet override    dextrose 5 % solution     Sara Wang: cabinet override    Tetanus-Diphth-Acell Pertussis (BOOSTRIX) injection 0.5 mL    fentaNYL (SUBLIMAZE) injection 50 mcg    phytonadione (ADULT) (VITAMIN K) 10 mg in dextrose 5 % 100 mL IVPB    fentaNYL (SUBLIMAZE) 100 MCG/2ML injection     [facility]: cabinet override    fentaNYL (SUBLIMAZE) 100 MCG/2ML injection     [facility]: cabinet override    lidocaine PF 1 % injection     [facility]: cabinet override       Patient was seen and evaluated emergency department. History and physical were completed. Patient was seen concurrently with trauma service. The patient was initially called a level II and downgraded to a level III activation prior to my evaluation and assessment. X-rays confirm multiple fractures of the feet and ankles as expected. The patient was given vitamin K 10 mg IV initially in anticipation of potential surgery. The INR however came back fairly low and K-Centra. will not be required. The patient was seen in consultation by trauma service, Dr. Delia Edmond and they have consulted with orthopedics. Orthopedics/podiatry is here and will assume further care and orders for patient at this time. Dr. Delia Edmond, trauma consult, admitting. CRITICAL CARE:  There was a high probability of clinically significant/life threatening deterioration in this patient's condition which required my urgent intervention. Total critical care time was 30 minutes. This excludes any time for separately reportable procedures. FINAL IMPRESSION      1.  Fall from ladder, initial encounter          DISPOSITION/PLAN   DISPOSITION Admitted 08/24/2018

## 2018-08-25 NOTE — PROGRESS NOTES
Pharmacy Note  Aminoglycoside Consult    Gisel Rosenthal is a 59 y.o. male ordered gentamicin; consult received from Dr. Katrin Conley to manage therapy. Also receiving Ancef. Patient Active Problem List   Diagnosis    Anticoagulated on Coumadin    Fall    Displaced intraarticular fracture of right calcaneus, initial encounter for open fracture    Open left tibial fracture    Open fracture of distal end of left fibula    Accidental fall from ladder    ELLEN (acute kidney injury) ([facility] [address]       Allergies:  Morphine     Temp max: AF    Recent Labs      08/24/18   2204  08/25/18   0434   BUN  41*  35*       Recent Labs      08/24/18 2204 08/25/18   0434   CREATININE  1.4*  1.2       Recent Labs      08/24/18 2204 08/25/18   0434   WBC  5.8  8.1         Intake/Output Summary (Last 24 hours) at 08/25/18 0641  Last data filed at 08/25/18 0640   Gross per 24 hour   Intake 0 ml   Output 725 ml   Net -725 ml       Height:   Ht Readings from Last 1 Encounters:   08/25/18 5' 4\" (1.626 m)     Weight:  Wt Readings from Last 1 Encounters:   08/25/18 175 lb (79.4 kg)     Estimated Creatinine Clearance: 61 mL/min (based on SCr of 1.2 mg/dL). Assessment/Plan:  gentamicin 80 mg q12h (SCr was 1.4 at time of initial dosing)  Renal fxn is improving. Will follow and increase as appropriate. Thank you for the consult.     Laisha GodfreyD

## 2018-08-25 NOTE — CONSULTS
Consults                                      CONSULTATION NOTE :ID       Patient - Jeramy Sevilla,  Age - 59 y.o.    - 1955      Room Number - 7K-10/010-A   MRN -  [id number]   [facility]  Date of Admission -  2018  9:59 PM  Patient's PCP: Nandini Michaud MD     Requesting Physician: Shruthi Alvarado MD    REASON FOR CONSULTATION   Open fracture of left ankle and right calcaneous    HISTORY OF PRESENT ILLNESS       This is a very pleasant 59 y.o. male who was admitted to the hospital with a chief complaints of fall. He was working on his [address] [place] [address] [employer] and fell 10-12 ft and broke his feet. He has open communited frcature on the left tibia and fibula and right calcaneous. He is scheduled for surgery later today. I was asked to see patiet and assist with antibiotic. He has been started on iv antibiotics. Has hx of blood clot in legs and hyperlipidemia.  Vaccinated for tetanus    PAST MEDICAL AND SURGICAL HISTORY       Past Medical History:   Diagnosis Date    Embolism - blood clot     rt calf and groin    Hyperlipidemia        Past Surgical History:   Procedure Laterality Date    HIP SURGERY      lt         MEDICATIONS PRIOR TO ADMISSION:       Scheduled Meds:   docusate sodium  100 mg Oral BID    famotidine  20 mg Oral BID    sodium chloride flush  10 mL Intravenous 2 times per day    ceFAZolin  2 g Intravenous Q8H    gentamicin  80 mg Intravenous Q12H    aminoglycoside intermittent dosing (placeholder)   Other RX Placeholder    [START ON 2018] atorvastatin  40 mg Oral Daily    polyethylene glycol  17 g Oral Daily    senna  2 tablet Oral Nightly     Continuous Infusions:   sodium chloride 50 mL/hr at 18 1059     PRN Meds:acetaminophen, bisacodyl, fentanNYL **OR** fentanNYL, oxyCODONE-acetaminophen **OR** oxyCODONE-acetaminophen, sodium chloride flush, magnesium hydroxide, ondansetron, HYDROmorphone, cyclobenzaprine  Allergies:   ALLERGIES: Morphine           SOCIAL HISTORY: in the last 72 hours. Magnesium:No results for input(s): MG in the last 72 hours. Phosphorus:No results for input(s): PHOS in the last 72 hours. BNP:No results for input(s): BNP in the last 72 hours. Glucose:No results for input(s): POCGLU in the last 72 hours. HgbA1C: No results for input(s): LABA1C in the last 72 hours. INR:   Recent Labs      08/25/18   0434   INR  1.25*     Hepatic:   Recent Labs      08/24/18   2204   ALKPHOS  59   ALT  26   AST  35   PROT  6.7   BILITOT  0.5   LABALBU  4.2     Amylase and Lipase:  Recent Labs      08/24/18 2204   LACTA  1.4     Lactic Acid:   Recent Labs      08/24/18   2204   LACTA  1.4     Troponin:   Recent Labs      08/25/18   0434   CKTOTAL  566*     BNP: No results for input(s): BNP in the last 72 hours. Lipids: No results for input(s): CHOL, TRIG, HDL, LDLCALC in the last 72 hours. Invalid input(s): LDL  ABGs: No results found for: PHART, PO2ART, IBL2DOT, FZW5HXE, BEART    Cultures:      CXR:       UA:   Recent Labs      08/24/18   0325   SPECGRAV  1.023   PHUR  5.0   COLORU  YELLOW   PROTEINU  NEGATIVE   BLOODU  NEGATIVE   NITRU  NEGATIVE   BILIRUBINUR  NEGATIVE   UROBILINOGEN  0.2   KETUA  40*         IMAGING:    Micro: No results found for: [facility]    Problem list of patient      Patient Active Problem List   Diagnosis Code    Anticoagulated on Coumadin Z51.81, Z79.01    Fall W19. Sallye Burows    Displaced intraarticular fracture of right calcaneus, initial encounter for open fracture S92.061B    Open left tibial fracture S82.202B    Open fracture of distal end of left fibula S82.832B    Accidental fall from ladder W11. XXXA    ELLEN (acute kidney injury) ([facility] [address] N17.9           ASSESSMENT AND PLAN   Fall froma ladder with open fracture left tibia and fibula and right calcaneous  uptodate on tetanus vaccination  Continue iv ancef, will hold gentamicin  Will closely follow post surgery for any compartment    Thank you Katharine Taylor MD for allowing me to

## 2018-08-25 NOTE — CONSULTS
hematuria, no kidney stones  Musculoskeletal: + bilat aknle and feet pain, swelling , stiffness  Endocrine: no polyuria, polydypsia, no cold or heat intolerence  Hematology: no anemia, no easy brusing or bleeding, no hx of clotting disorder  Dermatology: no skin rash, no eczema, no prurities,  Psychiatry: no depression, no anxiety,no panic attacks, no suicide ideation  Neurology: no syncope, no seizures, no numbness or tingling of hands, no numbness or tingling of feet, no paresis    10 point review of systems completed, all other than noted above are negative. Vitals:   Vitals:    08/25/18 0146   BP:    Pulse:    Resp:    Temp: 97.6 °F (36.4 °C)   SpO2: 98%      BMI: Body mass index is 30.04 kg/m².                 Exam:  Physical Examination: General appearance - alert, well appearing, and in some distress  Mental status - alert, oriented to person, place, and time  Neck - supple, no significant adenopathy, no JVD, or carotid bruits  Chest - clear to auscultation, no wheezes, rales or rhonchi, symmetric air entry  Heart - normal rate, regular rhythm, normal S1, S2, no murmurs, rubs, clicks or gallops  Abdomen - soft, nontender, nondistended, no masses or organomegaly  Neurological - alert, oriented, normal speech, no focal findings or movement disorder noted  Musculoskeletal - + bilateral ankle and feet pain, immobilized in soft cast,  joint tenderness, + bloody exudate due to open fractures, + swelling  Extremities - peripheral pulses normal, no pedal edema, no clubbing or cyanosis  Skin - normal coloration and turgor, no rashes, no suspicious skin lesions noted      Review of Labs and Diagnostic Testing:    Recent Results (from the past 24 hour(s))   APTT    Collection Time: 08/24/18 10:04 PM   Result Value Ref Range    aPTT 28.1 22.0 - 38.0 seconds   CBC Auto Differential    Collection Time: 08/24/18 10:04 PM   Result Value Ref Range    WBC 5.8 4.8 - 10.8 thou/mm3    RBC 4.43 (L) 4.70 - 6.10 mill/mm3 Hemoglobin 13.8 (L) 14.0 - 18.0 gm/dl    Hematocrit 40.0 (L) 42.0 - 52.0 %    MCV 90.3 80.0 - 94.0 fL    MCH 31.2 26.0 - 33.0 pg    MCHC 34.5 32.2 - 35.5 gm/dl    RDW-CV 12.2 11.5 - 14.5 %    RDW-SD 40.0 35.0 - 45.0 fL    Platelets 915 315 - 038 thou/mm3    MPV 8.9 (L) 9.4 - 12.4 fL    Seg Neutrophils 53.3 %    Lymphocytes 31.8 %    Monocytes 13.2 %    Eosinophils 1.2 %    Basophils 0.5 %    Immature Granulocytes 0.0 %    Segs Absolute 3.1 1.8 - 7.7 thou/mm3    Lymphocytes # 1.8 1.0 - 4.8 thou/mm3    Monocytes # 0.8 0.4 - 1.3 thou/mm3    Eosinophils # 0.1 0.0 - 0.4 thou/mm3    Basophils # 0.0 0.0 - 0.1 thou/mm3    Immature Grans (Abs) 0.00 0.00 - 0.07 thou/mm3    nRBC 0 /100 wbc   Comprehensive Metabolic Panel    Collection Time: 08/24/18 10:04 PM   Result Value Ref Range    Glucose 101 70 - 108 mg/dL    CREATININE 1.4 (H) 0.4 - 1.2 mg/dL    BUN 41 (H) 7 - 22 mg/dL    Sodium 140 135 - 145 meq/L    Potassium 4.8 3.5 - 5.2 meq/L    Chloride 104 98 - 111 meq/L    CO2 22 (L) 23 - 33 meq/L    Calcium 8.8 8.5 - 10.5 mg/dL    AST 35 5 - 40 U/L    Alkaline Phosphatase 59 38 - 126 U/L    Total Protein 6.7 6.1 - 8.0 g/dL    Alb 4.2 3.5 - 5.1 g/dL    Total Bilirubin 0.5 0.3 - 1.2 mg/dL    ALT 26 11 - 66 U/L   Ethanol    Collection Time: 08/24/18 10:04 PM   Result Value Ref Range    ETHYL ALCOHOL, SERUM < 0.01 0.00 %   Lactic Acid, Plasma    Collection Time: 08/24/18 10:04 PM   Result Value Ref Range    Lactic Acid 1.4 0.5 - 2.2 mmol/L   Platelet Map, TEG Citrated    Collection Time: 08/24/18 10:04 PM   Result Value Ref Range    Heparin Therapy NO     Reaction Time TEG 2.7 (L) 5.0 - 10.0 Minutes    Kinetics TEG 1.5 1.0 - 3.0 minutes    Angle TEG 70.7 53.0 - 72.0 deg    MA (Max Clot) TEG 61.1 50.0 - 70.0 mm    LY30(Lysis) TEG 0.0 0.0 - 7.5 %    EPL-TEG 0.0 %    Inhibition ADP TEG 0.0 %    Inhibition AA TEG 0.0 %    MA(Activated) TEG 5.1 mm    MA(ADP) TEG 66.0 mm    MA(AA) TEG 64.4 mm   Protime-INR    Collection Time: 08/24/18 10:04 PM   Result Value Ref Range    INR 1.21 (H) 0.85 - 1.13   TYPE AND SCREEN    Collection Time: 08/24/18 10:04 PM   Result Value Ref Range    ABO A     Rh Factor POS     Antibody Screen NEG    Anion Gap    Collection Time: 08/24/18 10:04 PM   Result Value Ref Range    Anion Gap 14.0 8.0 - 16.0 meq/L   Glomerular Filtration Rate, Estimated    Collection Time: 08/24/18 10:04 PM   Result Value Ref Range    Est, Glom Filt Rate 51 (A) ml/min/1.73m2   Osmolality    Collection Time: 08/24/18 10:04 PM   Result Value Ref Range    Osmolality Calc 289.7 275.0 - 300 mOsmol/kg   EKG 12 lead    Collection Time: 08/25/18  2:09 AM   Result Value Ref Range    Ventricular Rate 74 BPM    Atrial Rate 74 BPM    P-R Interval 138 ms    QRS Duration 94 ms    Q-T Interval 392 ms    QTc Calculation (Bazett) 435 ms    P Axis 58 degrees    R Axis -5 degrees    T Axis -8 degrees       Radiology:     Xr Tibia Fibula Left (2 Views)    Result Date: 8/24/2018  PROCEDURE: XR TIBIA FIBULA LEFT (2 VIEWS) CLINICAL INFORMATION: trauma, . COMPARISON: No prior study. TECHNIQUE: AP and lateral views of the left lower leg. FINDINGS: Bones/joints: There is a comminuted distal tibial metaphyseal fracture with lateral and anterior displacement of the distal fracture fragments and lateral and posterior angulation of the distal fracture fragments. There is intra-articular extension to the mortise. There is a fracture of the distal fibula with overlying the fracture fragments and posterior displacement and lateral and mild posterior angulation of the distal fracture fragment. There are screws in the distal tibial shaft consistent with old ORIF. There is mild widening of the lateral ankle mortise. There is a probable nondisplaced fibular neck fracture. Recommend follow-up. Soft tissues: There is soft tissue swelling about the fracture site with air in the soft tissues consistent with an open fracture.  The end of the proximal tibial fracture fragment probably no chemical or mechanical prophylaxis necessary              [] Already on Anticoagulation                Anticipated Disposition upon discharge: [] Home                                                                         [] Home with Home Health                                                                         [] [facility]                                                                         [] [address]          Electronically signed by Yojana Rivers DO on 8/25/2018 at 3:50 AM

## 2018-08-25 NOTE — H&P
1.2 mg/dL    ALT 26 11 - 66 U/L   Ethanol   Result Value Ref Range    ETHYL ALCOHOL, SERUM < 0.01 0.00 %   Lactic Acid, Plasma   Result Value Ref Range    Lactic Acid 1.4 0.5 - 2.2 mmol/L   Protime-INR   Result Value Ref Range    INR 1.21 (H) 0.85 - 1.13   Anion Gap   Result Value Ref Range    Anion Gap 14.0 8.0 - 16.0 meq/L   Glomerular Filtration Rate, Estimated   Result Value Ref Range    Est, Glom Filt Rate 51 (A) ml/min/1.73m2   Osmolality   Result Value Ref Range    Osmolality Calc 289.7 275.0 - 300 mOsmol/kg   TYPE AND SCREEN   Result Value Ref Range    ABO A     Rh Factor POS     Antibody Screen NEG        Physical Exam:  Patient Vitals for the past 24 hrs:   BP Temp Pulse Resp SpO2 Height Weight   08/24/18 2322 102/65 - - - - - -   08/24/18 2200 (!) 159/86 - 72 14 99 % - -   08/24/18 2159 - - - - - 5' 4\" (1.626 m) 175 lb (79.4 kg)   08/24/18 2159 - 98.4 °F (36.9 °C) - - - - -     Primary Assessment:  Airway: Patent, trachea midline  Breathing: Breath sounds present and equal bilaterally, spontaneous, and unlabored  Circulation: Hemodynamically stable, 2+ central and peripheral pulses. Disability: ASHER x 4, following commands. GCS =15    Secondary Assessment:  General: Alert, mild distress from complaints of pain. Head: Normocephalic, mid face stable. Tympanic membranes intact. Nares patent bilaterally, no epistaxis. Mouth clear of foreign bodies, no lacerations or abrasions. Eyes: PERRLA. EOMI. Nontraumatic. Neurologic: A & O x3. Following commands. CN 2-12 intact  Neck:  trachea midline. Cervical spines NTTP midline, without step-offs, crepitus or deformity. Back:TL spines are NTTP midline, without step-offs, crepitus or deformity. No abrasions, contusions, or ecchymosis noted. Lungs: Clear to auscultation bilaterally. Chest Wall: Chest rise symmetrical.  Chest wall without tenderness to palpation. No crepitus, deformities, lacerations, or abrasions. Heart: RRR. Normal S1/S2.   No obvious
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
## 2018-08-25 NOTE — ANESTHESIA PRE PROCEDURE
injection 10 mL  10 mL Intravenous PRN Parminder Salaam, DPM        magnesium hydroxide (MILK OF MAGNESIA) 400 MG/5ML suspension 30 mL  30 mL Oral Daily PRN Parminder Salaam, DPM        [facility] injection 4 mg  4 mg Intravenous Q6H PRN Parminder Salaam, DPM        ceFAZolin (ANCEF) 2 g in dextrose 5 % 50 mL IVPB  2 g Intravenous Q8H Parminder Salaam, DPM   Stopped at 08/25/18 1415    HYDROmorphone (DILAUDID) injection 1 mg  1 mg Intravenous Q4H PRN Parminder Salaam, DPM        cyclobenzaprine (FLEXERIL) tablet 10 mg  10 mg Oral TID PRN Parminder Salaam, DPM   10 mg at 08/25/18 0957    aminoglycoside intermittent dosing (placeholder)   Other RX Placeholder Parminder Salaam, DPM        [START ON 8/26/2018] atorvastatin (LIPITOR) tablet 40 mg  40 mg Oral Daily Stan Ip, APRN - CNP        polyethylene glycol (GLYCOLAX) packet 17 g  17 g Oral Daily Stan Ip, APRN - CNP        senna (SENOKOT) tablet 17.2 mg  2 tablet Oral Nightly Stan Ip, APRN - CNP           Allergies: Allergies   Allergen Reactions    Morphine        Problem List:    Patient Active Problem List   Diagnosis Code    Anticoagulated on Coumadin Z51.81, Z79.01    Fall W19. Oliveira Brochure    Displaced intraarticular fracture of right calcaneus, initial encounter for open fracture S92.061B    Open left tibial fracture S82.202B    Open fracture of distal end of left fibula S82.832B    Accidental fall from ladder W11. Oliveira Brochure    ELLEN (acute kidney injury) ([facility] [address] N17.9       Past Medical History:        Diagnosis Date    Embolism - blood clot     rt calf and groin    Hyperlipidemia        Past Surgical History:        Procedure Laterality Date    HIP SURGERY      lt       Social History:    Social History   Substance Use Topics    Smoking status: Never Smoker    Smokeless tobacco: Never Used    Alcohol use No                                Counseling given: Not Answered      Vital Signs (Current):   Vitals:    08/25/18 0143 08/25/18 0146 08/25/18 0842 08/25/18 1220   BP: 132/75  129/78 122/73   Pulse: 66  67 62   Resp: 18  16 16   Temp:  97.6 °F (36.4 °C) 96.2 °F (35.7 °C)    TempSrc: Oral  Oral    SpO2:  98% 100% 96%   Weight:  175 lb (79.4 kg)     Height:  5' 4\" (1.626 m)                                                BP Readings from Last 3 Encounters:   08/25/18 122/73   11/26/12 (!) 147/100       NPO Status:                                                                                 BMI:   Wt Readings from Last 3 Encounters:   08/25/18 175 lb (79.4 kg)   11/26/12 165 lb (74.8 kg)     Body mass index is 30.04 kg/m². CBC:   Lab Results   Component Value Date    WBC 8.1 08/25/2018    RBC 3.70 08/25/2018    HGB 11.5 08/25/2018    HCT 34.7 08/25/2018    MCV 93.8 08/25/2018     08/25/2018       CMP:   Lab Results   Component Value Date     08/25/2018    K 4.7 08/25/2018     08/25/2018    CO2 19 08/25/2018    BUN 35 08/25/2018    CREATININE 1.2 08/25/2018    LABGLOM 61 08/25/2018    GLUCOSE 106 08/25/2018    PROT 6.7 08/24/2018    CALCIUM 8.2 08/25/2018    BILITOT 0.5 08/24/2018    ALKPHOS 59 08/24/2018    AST 35 08/24/2018    ALT 26 08/24/2018       POC Tests: No results for input(s): POCGLU, POCNA, POCK, POCCL, POCBUN, POCHEMO, POCHCT in the last 72 hours.     Coags:   Lab Results   Component Value Date    INR 1.25 08/25/2018    APTT 28.1 08/24/2018       HCG (If Applicable): No results found for: PREGTESTUR, PREGSERUM, HCG, HCGQUANT     ABGs: No results found for: PHART, PO2ART, JYJ9TVL, WKI6GKM, BEART, K9RXEFZS     Type & Screen (If Applicable):  Lab Results   Component Value Date    LABRH POS 08/24/2018       Anesthesia Evaluation    Airway: Mallampati: II       Mouth opening: > = 3 FB Dental:          Pulmonary:       (-) COPD                           Cardiovascular:        (-) CAD      Rhythm: regular                      Neuro/Psych:      (-) CVA           GI/Hepatic/Renal:             Endo/Other:

## 2018-08-25 NOTE — H&P
Department of Podiatric Surgery  History and Physical        CHIEF COMPLAINT:  Fall    Reason for Admission:  Open fractures, bilateral lower extremity    History Obtained From:  patient, spouse    HISTORY OF PRESENT ILLNESS:      The patient is a 59 y.o. male with significant past medical history of DVT who presents with bilateral open fractures. Patient states that he was climbing a ladder around 9pm this evening and fell from a height of 10-12 feet. Patient states that he fell directly on his feet onto concrete. Patient denies any other trauma, patient denies LOC. Patient was alone at the time of the injury and yelled for help, patient states a neighbor came to his aid within a few minutes. Patient was then treated by EMS and brought to the ED. Patient reports pain is a 10/10 with pain worse in the left leg. Past Medical History:        Diagnosis Date    Embolism - blood clot     rt calf and groin    Hyperlipidemia        Past Surgical History:        Procedure Laterality Date    HIP SURGERY      lt       Immunizations:              Tetanus:  up to date              Medications Prior to Admission:   Prescriptions Prior to Admission: warfarin (COUMADIN) 7.5 MG tablet, Take 7.5 mg by mouth  Atorvastatin Calcium (LIPITOR PO), Take by mouth    Allergies:  Morphine    Social History:   TOBACCO:  Never used tobacco  ETOH:  Never drank alcohol    Family History:   History reviewed. No pertinent family history.     REVIEW OF SYSTEMS:  Constitutional: negative for weight change, fatigue, weakness, fever, chills, night sweats, anorexia, malaise  Head: negative for trauma, headache, confusion, light-headedness  Eyes: negative for pain, blurriness, itching, redness, acute visual changes  Ears: negative for hearing loss, tinnitus, vertigo, discharge, earache  Nose/Sinuses: negative for rhinorrhea, congestion, sneezing, itching, allergies, epistaxis   Mouth/Throat/Neck: negative for bleeding gums, hoarseness, sore throat, at the digits bilaterally. Musculoskeletal: Muscle strength deferred. IMAGING:  XR Left Ankle   Impression    Comminuted, displaced, and angulated open fracture of the distal tibial metaphysis as detailed above. Displaced and angulated distal fibular fracture.         XR Right Ankle   Impression    Comminuted displaced posterior superior calcaneus fracture. Possible intra-articular fracture of the distal calcaneus at the calcaneocuboid joint versus artifact. XR Left Foot   Impression    Comminuted displaced posterior superior calcaneus fracture. Possible intra-articular fracture of the distal calcaneus at the calcaneocuboid joint versus artifact. XR Right Foot   Impression    Comminuted displaced posterior calcaneus fracture. Possible air in the soft tissues suspicious for an open fracture.         XR Left Tib-Fib  Impression    Displaced and angulated comminuted distal tibial metaphyseal fracture with intra-articular extension. The fracture is open and the distal end of the proximal tibial shaft fracture fragment likely protrudes through the skin. Displaced and angulated distal fibular shaft fracture with overlying the fracture fragments. Probable fibular neck fracture. XR Right Tib-Fib      Impression    No acute fracture or dislocation. Right           Left                LABS:  CBC   Recent Labs      08/24/18 2204   WBC  5.8   HGB  13.8*   HCT  40.0*   MCV  90.3   PLT  217     BMP:   Recent Labs      08/24/18 2204   NA  140   K  4.8   CL  104   CO2  22*   BUN  41*   CREATININE  1.4*   GLUCOSE  101     ABG: No results for input(s): PHART, MJB9KDT, PO2ART, DRP8IKG, J0AQCVYY in the last 72 hours.   LIVER PROFILE   Recent Labs      08/24/18 2204   AST  35   ALT  26   BILITOT  0.5   ALKPHOS  59     INR   Recent Labs      08/24/18 2204   INR  1.21*     PTT   Lab Results   Component Value Date    APTT 28.1 08/24/2018       ASSESSMENT:  Principle   Open fractures, bilateral lower extremity     Chronic  Patient Active Problem List   Diagnosis    Anticoagulated on Coumadin    Fall    Displaced intraarticular fracture of right calcaneus, initial encounter for open fracture    Open left tibial fracture    Open fracture of distal end of left fibula    Accidental fall from ladder       PLAN:  1. Open Fractures Bilateral Lower Extremity   -Patient seen and examined, case discussed with patient and family   -[place] of bilateral open fractures done bedside with sterile saline   -Open fracture sites dressed with Xeroform, wet-to-dry dressings, and patient put into a long leg splint, left and a posterior splint, right  -Patient started on IV antibiotics, consult to ID for recommendations   -Patient OK for diet tonight, will be NPO starting at 0500 on 8/25/18 for possible OR   -Plan to take patient to OR for reduction and fixation of the fractures, awaiting OR availability   -Patient on strict bedrest, with splints intact to bilateral lower extremity  -Chest xray and EKG ordered for surgical clearance   -Xray lumbar spine ordered to rule out additional injuries from fall       2. History of DVT   - H&H, PT/INR labs ordered   -Anticoagulants tentatively held for pending surgical intervention   -Consult to hospitalist for additional management         DISPO: patient to be taken to OR reduction and fixation of open fractures and debridement of surrounding soft tissue. Awaiting OR availability. Bonnie Griffin DPM  8/25/2018 2:22 AM   Electronically signed by Bonnie Griffin DPM on 8/25/2018 at 2:46 AM    ADDENDUM 8.25.18 1310  Patient seen at bedside this morning with Dr. Jonnathan Slater. Patient reports that his pain is currently being controlled, with the exception of his left leg. Patient scheduled as an add-on case tonight 8.25.18 at 6pm. Patient NPO after breakfast. Will resume anticoagulation post-operatively. I saw and evaluated the patient bedside with resident physician Sal Hinojosa.

## 2018-08-26 ENCOUNTER — ANESTHESIA (OUTPATIENT)
Dept: OPERATING ROOM | Age: 63
DRG: 492 | End: 2018-08-26
Payer: COMMERCIAL

## 2018-08-26 ENCOUNTER — APPOINTMENT (OUTPATIENT)
Dept: CT IMAGING | Age: 63
DRG: 492 | End: 2018-08-26
Payer: COMMERCIAL

## 2018-08-26 LAB
ANION GAP SERPL CALCULATED.3IONS-SCNC: 10 MEQ/L (ref 8–16)
APTT: 60.9 SECONDS (ref 22–38)
APTT: 97.1 SECONDS (ref 22–38)
BASOPHILS # BLD: 0.1 %
BASOPHILS ABSOLUTE: 0 THOU/MM3 (ref 0–0.1)
BUN BLDV-MCNC: 15 MG/DL (ref 7–22)
CALCIUM SERPL-MCNC: 7.6 MG/DL (ref 8.5–10.5)
CHLORIDE BLD-SCNC: 103 MEQ/L (ref 98–111)
CO2: 22 MEQ/L (ref 23–33)
CREAT SERPL-MCNC: 1.1 MG/DL (ref 0.4–1.2)
EOSINOPHIL # BLD: 0.1 %
EOSINOPHILS ABSOLUTE: 0 THOU/MM3 (ref 0–0.4)
ERYTHROCYTE [DISTWIDTH] IN BLOOD BY AUTOMATED COUNT: 12.2 % (ref 11.5–14.5)
ERYTHROCYTE [DISTWIDTH] IN BLOOD BY AUTOMATED COUNT: 41.5 FL (ref 35–45)
GFR SERPL CREATININE-BSD FRML MDRD: 68 ML/MIN/1.73M2
GLUCOSE BLD-MCNC: 139 MG/DL (ref 70–108)
HCT VFR BLD CALC: 23.5 % (ref 42–52)
HCT VFR BLD CALC: 26 % (ref 42–52)
HEMOGLOBIN: 8.1 GM/DL (ref 14–18)
HEMOGLOBIN: 8.5 GM/DL (ref 14–18)
IMMATURE GRANS (ABS): 0.02 THOU/MM3 (ref 0–0.07)
IMMATURE GRANULOCYTES: 0.3 %
INR BLD: 1.18 (ref 0.85–1.13)
LYMPHOCYTES # BLD: 12.5 %
LYMPHOCYTES ABSOLUTE: 0.9 THOU/MM3 (ref 1–4.8)
MCH RBC QN AUTO: 30.4 PG (ref 26–33)
MCHC RBC AUTO-ENTMCNC: 32.7 GM/DL (ref 32.2–35.5)
MCV RBC AUTO: 92.9 FL (ref 80–94)
MONOCYTES # BLD: 11.4 %
MONOCYTES ABSOLUTE: 0.8 THOU/MM3 (ref 0.4–1.3)
NUCLEATED RED BLOOD CELLS: 0 /100 WBC
PLATELET # BLD: 132 THOU/MM3 (ref 130–400)
PMV BLD AUTO: 9.4 FL (ref 9.4–12.4)
POTASSIUM SERPL-SCNC: 4.6 MEQ/L (ref 3.5–5.2)
RBC # BLD: 2.8 MILL/MM3 (ref 4.7–6.1)
SEG NEUTROPHILS: 75.6 %
SEGMENTED NEUTROPHILS ABSOLUTE COUNT: 5.3 THOU/MM3 (ref 1.8–7.7)
SODIUM BLD-SCNC: 135 MEQ/L (ref 135–145)
WBC # BLD: 7 THOU/MM3 (ref 4.8–10.8)

## 2018-08-26 PROCEDURE — 1200000003 HC TELEMETRY R&B

## 2018-08-26 PROCEDURE — 6370000000 HC RX 637 (ALT 250 FOR IP): Performed by: STUDENT IN AN ORGANIZED HEALTH CARE EDUCATION/TRAINING PROGRAM

## 2018-08-26 PROCEDURE — 97163 PT EVAL HIGH COMPLEX 45 MIN: CPT

## 2018-08-26 PROCEDURE — G8978 MOBILITY CURRENT STATUS: HCPCS

## 2018-08-26 PROCEDURE — 97530 THERAPEUTIC ACTIVITIES: CPT

## 2018-08-26 PROCEDURE — 80048 BASIC METABOLIC PNL TOTAL CA: CPT

## 2018-08-26 PROCEDURE — 99232 SBSQ HOSP IP/OBS MODERATE 35: CPT | Performed by: SURGERY

## 2018-08-26 PROCEDURE — 73700 CT LOWER EXTREMITY W/O DYE: CPT

## 2018-08-26 PROCEDURE — 85018 HEMOGLOBIN: CPT

## 2018-08-26 PROCEDURE — 6370000000 HC RX 637 (ALT 250 FOR IP): Performed by: SURGERY

## 2018-08-26 PROCEDURE — 6360000002 HC RX W HCPCS: Performed by: SURGERY

## 2018-08-26 PROCEDURE — 85610 PROTHROMBIN TIME: CPT

## 2018-08-26 PROCEDURE — 85025 COMPLETE CBC W/AUTO DIFF WBC: CPT

## 2018-08-26 PROCEDURE — 99233 SBSQ HOSP IP/OBS HIGH 50: CPT | Performed by: INTERNAL MEDICINE

## 2018-08-26 PROCEDURE — 36415 COLL VENOUS BLD VENIPUNCTURE: CPT

## 2018-08-26 PROCEDURE — 6370000000 HC RX 637 (ALT 250 FOR IP): Performed by: NURSE PRACTITIONER

## 2018-08-26 PROCEDURE — 85014 HEMATOCRIT: CPT

## 2018-08-26 PROCEDURE — 85730 THROMBOPLASTIN TIME PARTIAL: CPT

## 2018-08-26 PROCEDURE — 6360000002 HC RX W HCPCS: Performed by: STUDENT IN AN ORGANIZED HEALTH CARE EDUCATION/TRAINING PROGRAM

## 2018-08-26 PROCEDURE — G8979 MOBILITY GOAL STATUS: HCPCS

## 2018-08-26 PROCEDURE — 2580000003 HC RX 258: Performed by: STUDENT IN AN ORGANIZED HEALTH CARE EDUCATION/TRAINING PROGRAM

## 2018-08-26 RX ORDER — HEPARIN SODIUM 1000 [USP'U]/ML
80 INJECTION, SOLUTION INTRAVENOUS; SUBCUTANEOUS ONCE
Status: COMPLETED | OUTPATIENT
Start: 2018-08-26 | End: 2018-08-26

## 2018-08-26 RX ORDER — HEPARIN SODIUM 1000 [USP'U]/ML
40 INJECTION, SOLUTION INTRAVENOUS; SUBCUTANEOUS PRN
Status: DISCONTINUED | OUTPATIENT
Start: 2018-08-26 | End: 2018-08-27 | Stop reason: SDUPTHER

## 2018-08-26 RX ORDER — HEPARIN SODIUM 1000 [USP'U]/ML
80 INJECTION, SOLUTION INTRAVENOUS; SUBCUTANEOUS PRN
Status: DISCONTINUED | OUTPATIENT
Start: 2018-08-26 | End: 2018-08-27 | Stop reason: SDUPTHER

## 2018-08-26 RX ORDER — HEPARIN SODIUM 10000 [USP'U]/100ML
19 INJECTION, SOLUTION INTRAVENOUS CONTINUOUS
Status: DISCONTINUED | OUTPATIENT
Start: 2018-08-26 | End: 2018-08-30 | Stop reason: HOSPADM

## 2018-08-26 RX ORDER — TRAMADOL HYDROCHLORIDE 50 MG/1
50 TABLET ORAL EVERY 6 HOURS
Status: COMPLETED | OUTPATIENT
Start: 2018-08-26 | End: 2018-08-28

## 2018-08-26 RX ADMIN — HYDROMORPHONE HYDROCHLORIDE 1 MG: 1 INJECTION, SOLUTION INTRAMUSCULAR; INTRAVENOUS; SUBCUTANEOUS at 16:30

## 2018-08-26 RX ADMIN — TRAMADOL HYDROCHLORIDE 50 MG: 50 TABLET, FILM COATED ORAL at 14:12

## 2018-08-26 RX ADMIN — Medication 2 G: at 23:07

## 2018-08-26 RX ADMIN — DOCUSATE SODIUM 100 MG: 100 CAPSULE, LIQUID FILLED ORAL at 20:55

## 2018-08-26 RX ADMIN — TRAMADOL HYDROCHLORIDE 50 MG: 50 TABLET, FILM COATED ORAL at 20:15

## 2018-08-26 RX ADMIN — FAMOTIDINE 20 MG: 20 TABLET ORAL at 08:54

## 2018-08-26 RX ADMIN — OXYCODONE HYDROCHLORIDE AND ACETAMINOPHEN 2 TABLET: 5; 325 TABLET ORAL at 08:12

## 2018-08-26 RX ADMIN — CYCLOBENZAPRINE 10 MG: 10 TABLET, FILM COATED ORAL at 11:57

## 2018-08-26 RX ADMIN — SENNOSIDES 17.2 MG: 8.6 TABLET, FILM COATED ORAL at 20:55

## 2018-08-26 RX ADMIN — FENTANYL CITRATE 50 MCG: 50 INJECTION INTRAMUSCULAR; INTRAVENOUS at 10:40

## 2018-08-26 RX ADMIN — SODIUM CHLORIDE: 9 INJECTION, SOLUTION INTRAVENOUS at 07:18

## 2018-08-26 RX ADMIN — OXYCODONE HYDROCHLORIDE AND ACETAMINOPHEN 2 TABLET: 5; 325 TABLET ORAL at 01:22

## 2018-08-26 RX ADMIN — Medication 2 G: at 00:42

## 2018-08-26 RX ADMIN — Medication 2 G: at 08:12

## 2018-08-26 RX ADMIN — HEPARIN SODIUM 6350 UNITS: 1000 INJECTION, SOLUTION INTRAVENOUS; SUBCUTANEOUS at 08:13

## 2018-08-26 RX ADMIN — FENTANYL CITRATE 50 MCG: 50 INJECTION INTRAMUSCULAR; INTRAVENOUS at 12:52

## 2018-08-26 RX ADMIN — DOCUSATE SODIUM 100 MG: 100 CAPSULE, LIQUID FILLED ORAL at 08:53

## 2018-08-26 RX ADMIN — Medication 10 ML: at 10:41

## 2018-08-26 RX ADMIN — Medication 10 ML: at 00:43

## 2018-08-26 RX ADMIN — Medication 2 G: at 15:26

## 2018-08-26 RX ADMIN — FENTANYL CITRATE 50 MCG: 50 INJECTION INTRAMUSCULAR; INTRAVENOUS at 04:32

## 2018-08-26 RX ADMIN — Medication 10 ML: at 20:55

## 2018-08-26 RX ADMIN — FENTANYL CITRATE 50 MCG: 50 INJECTION INTRAMUSCULAR; INTRAVENOUS at 15:26

## 2018-08-26 RX ADMIN — HEPARIN SODIUM 18 UNITS/KG/HR: 10000 INJECTION, SOLUTION INTRAVENOUS at 08:13

## 2018-08-26 RX ADMIN — OXYCODONE HYDROCHLORIDE AND ACETAMINOPHEN 2 TABLET: 5; 325 TABLET ORAL at 20:55

## 2018-08-26 RX ADMIN — POLYETHYLENE GLYCOL 3350 17 G: 17 POWDER, FOR SOLUTION ORAL at 08:54

## 2018-08-26 RX ADMIN — FAMOTIDINE 20 MG: 20 TABLET ORAL at 20:55

## 2018-08-26 RX ADMIN — CYCLOBENZAPRINE 10 MG: 10 TABLET, FILM COATED ORAL at 20:15

## 2018-08-26 RX ADMIN — ATORVASTATIN CALCIUM 40 MG: 40 TABLET, FILM COATED ORAL at 08:53

## 2018-08-26 ASSESSMENT — PAIN DESCRIPTION - ONSET
ONSET: ON-GOING

## 2018-08-26 ASSESSMENT — PAIN DESCRIPTION - FREQUENCY
FREQUENCY: INTERMITTENT
FREQUENCY: INTERMITTENT
FREQUENCY: CONTINUOUS
FREQUENCY: INTERMITTENT
FREQUENCY: CONTINUOUS
FREQUENCY: INTERMITTENT

## 2018-08-26 ASSESSMENT — PAIN DESCRIPTION - LOCATION
LOCATION: ANKLE;LEG
LOCATION: ANKLE
LOCATION: ANKLE;FOOT
LOCATION: ANKLE
LOCATION: ANKLE;FOOT

## 2018-08-26 ASSESSMENT — PAIN SCALES - GENERAL
PAINLEVEL_OUTOF10: 8
PAINLEVEL_OUTOF10: 7
PAINLEVEL_OUTOF10: 8
PAINLEVEL_OUTOF10: 8
PAINLEVEL_OUTOF10: 7
PAINLEVEL_OUTOF10: 7
PAINLEVEL_OUTOF10: 8
PAINLEVEL_OUTOF10: 6
PAINLEVEL_OUTOF10: 7
PAINLEVEL_OUTOF10: 5
PAINLEVEL_OUTOF10: 7
PAINLEVEL_OUTOF10: 7
PAINLEVEL_OUTOF10: 8
PAINLEVEL_OUTOF10: 3
PAINLEVEL_OUTOF10: 7
PAINLEVEL_OUTOF10: 7

## 2018-08-26 ASSESSMENT — PAIN DESCRIPTION - PROGRESSION
CLINICAL_PROGRESSION: NOT CHANGED
CLINICAL_PROGRESSION: GRADUALLY WORSENING
CLINICAL_PROGRESSION: NOT CHANGED
CLINICAL_PROGRESSION: NOT CHANGED

## 2018-08-26 ASSESSMENT — PAIN DESCRIPTION - PAIN TYPE
TYPE: SURGICAL PAIN

## 2018-08-26 ASSESSMENT — PAIN DESCRIPTION - ORIENTATION
ORIENTATION: RIGHT;LEFT
ORIENTATION: LEFT;RIGHT
ORIENTATION: RIGHT;LEFT

## 2018-08-26 ASSESSMENT — PAIN DESCRIPTION - DESCRIPTORS
DESCRIPTORS: ACHING;THROBBING
DESCRIPTORS: ACHING;DISCOMFORT
DESCRIPTORS: ACHING
DESCRIPTORS: ACHING;CONSTANT
DESCRIPTORS: ACHING;DISCOMFORT
DESCRIPTORS: ACHING
DESCRIPTORS: ACHING

## 2018-08-26 NOTE — PROGRESS NOTES
Normal Limits  Follows Commands: Within Functional Limits    Vision: Within Functional Limits    Hearing: Within functional limits         Pain:  Yes. Pain Assessment  Pain Assessment: 0-10  Pain Level: 3 (given pain meds during session)  Pain Type: Surgical pain  Pain Location: Ankle; Foot  Pain Orientation: Right;Left  Pain Descriptors: Aching; Throbbing       Social/Functional History:    Lives With: Spouse  Type of Home: House  Home Layout: Two level, Able to Live on Main level with bedroom/bathroom  Home Access: Stairs to enter without rails (family reports can have ramp installed)  Entrance Stairs - Number of Steps: 2             ADL Assistance: Independent  Homemaking Assistance: Independent  Ambulation Assistance: Independent  Transfer Assistance: Independent    Active : Yes  Type of occupation:   Additional Comments: very active PTA      Objective:     Strength B LEs  not able to formally assess, able to perform straight leg raise B    Sensation  Overall Sensation Status: WNL               Balance  Sitting - Static: Fair, +  Sitting - Dynamic: Fair, +  Comments: patient able to tolerate 10minutes at EOB at [facility] for safety. verbal cues throughout for proper breathing    Supine to Sit: Contact guard assistance (HOB slightly elevated with use of rail. cues for performance)  Sit to Supine: Contact guard assistance (bed flat with rail. cues for performance)  Scooting: Contact guard assistance (B directions along EOB in prep for sliding board, slight difficulty secondary to softness of bed)       Exercises:  Comments: reviewed and performed UE HEP packet consisting of elbow extension, B shoulder abd ipsi and simaltaneous, shoulder press x10 reps each B with red tband to increase strength for improved mobility       Activity Tolerance:  Activity Tolerance: Patient Tolerated treatment well    Treatment Initiated: christina complete, see therex and balance above    Assessment:   Body structures, Functions, Activity limitations: Decreased functional mobility , Decreased ROM, Decreased ADL status, Decreased strength, Decreased safe awareness, Decreased endurance, Decreased balance  Assessment: patient presents s/p traumatic fall from ladder resulting in B LE fractures, now NWB B LEs. demos decreased level of mobility as well as tolerance to activity. patient very motivated and requires cues at times for proper safety. requires skilled PT to improve mobility and ensure proper safety prior to d/c  Prognosis: Good    Clinical Presentation: High - Unstable with Unpredictable Characteristics: B LE NWB, varying pain levels, decreased safety awarenes, I PTA:    Decision Making: High Complexitybased on patient assessment and decision making process of determining plan of care and establishing reasonable expectations for measurable functional outcomes    REQUIRES PT FOLLOW UP: Yes  Discharge Recommendations: Continue to assess pending progress    Patient Education:  Patient Education: POC, bed mobility, therex, role of PT    Equipment Recommendations:  Equipment Needed: Yes  Other: continue to assess    Safety:  Type of devices:  All fall risk precautions in place, Call light within reach, Gait belt, Patient at risk for falls, Left in bed, Nurse notified    Plan:  Times per week: 7x T  Current Treatment Recommendations: Strengthening, Balance Training, Functional Mobility Training, Home Exercise Program, Safety Education & Training, Patient/Caregiver Education & Training, Equipment Evaluation, Education, & procurement, Endurance Training    Goals:  Patient goals : gain independence  Short term goals  Time Frame for Short term goals: 2 weeks  Short term goal 1: patient to perform sit to and from supine at S with maintenance of NWB status to get in and out of bed  Short term goal 2: patient to tolerate 20 minutes at EOB at S for increased tolerance to activity  Short term goal 3: patient able to perform lateral transfers along EOB

## 2018-08-26 NOTE — PROGRESS NOTES
Heparin Consult  Lab Results   Component Value Date    APTT 97.1 08/26/2018     Lab Results   Component Value Date    HGB 8.5 08/26/2018    HCT 26.0 08/26/2018     08/26/2018    INR 1.18 08/26/2018       Current Rate: 18 units/kg/hr    Plan:  Decrease heparin drip to 16 units/kg/hr.    Next aPTT: today at 181 Cathy Rueda PharmD, BCPS 8/26/2018 5:32 PM

## 2018-08-26 NOTE — PROGRESS NOTES
2156: Patient arrived to PACU. Report received from Soraida Ren CRNA and Christiane Martínez. Patient placed on cardiac monitor. Patient very drowsy, opens eyes when name is called but falls right back to sleep. Patient on room air. Ice applied to surgical sites and warm blankets placed on patient. 2011: patient resting in bed comfortably. He is responding to questions now but falls right back to sleep. He was able to wiggle does. Denies pain, numbness, tingling in legs. Denies nausea. Oriented x4.   7075: Patient still resting in bed with eyes closed. 2230: patient attempting to use urinal. He is asking for privacy and laying on right side. 2235: patient unable to use urinal at this time. He states he is anxious due to lack of privacy and would like to wait until he gets to his room. 2237: patient meets pacu discharge criteria. Report called to [facility] night shift RN. Patient's family already in room. Patient transported to [facility] in stable condition on room air. Patient's chart and heart monitor on bed.

## 2018-08-26 NOTE — PROGRESS NOTES
Hospitalist Progress Note    Patient:  Lori Garnica      Unit/Bed:7K-10/010-A    YOB: 1955    MRN: [id number]       Acct: [de-identified]     PCP: Missy Garcia MD    Date of Admission: 8/24/2018    Chief Complaint: fall, open fractures    Hospital Course: Pt is a 60 y/o who fell off a ladder. Suffered LE open fractures. Admitted by trauma, s/p ORIF. On antibiotics. Pain concerning but tolerable. Tolerating orals. Pt was on chronic anticoagulations. Reversed, now on heparin. Continue mgt    Subjective: c/o pain       Medications:  Reviewed    Infusion Medications    heparin (porcine) 18 Units/kg/hr (08/26/18 0813)    sodium chloride 50 mL/hr at 08/26/18 0718     Scheduled Medications    famotidine  20 mg Oral BID    ceFAZolin  2 g Intravenous Q8H    aminoglycoside intermittent dosing (placeholder)   Other RX Placeholder    atorvastatin  40 mg Oral Daily    polyethylene glycol  17 g Oral Daily    senna  2 tablet Oral Nightly    sodium chloride flush  10 mL Intravenous 2 times per day    docusate sodium  100 mg Oral BID     PRN Meds: heparin (porcine), heparin (porcine), acetaminophen, bisacodyl, fentanNYL **OR** fentanNYL, oxyCODONE-acetaminophen **OR** oxyCODONE-acetaminophen, magnesium hydroxide, ondansetron, HYDROmorphone, cyclobenzaprine, sodium chloride flush, HYDROmorphone      Intake/Output Summary (Last 24 hours) at 08/26/18 1320  Last data filed at 08/26/18 1300   Gross per 24 hour   Intake             4272 ml   Output             1050 ml   Net             3222 ml       Diet:  DIET GENERAL;    Exam:  /70   Pulse 89   Temp 99 °F (37.2 °C) (Oral)   Resp 16   Ht 5' 4\" (1.626 m)   Wt 175 lb (79.4 kg)   SpO2 94%   BMI 30.04 kg/m²     General appearance: No apparent distress, appears stated age and cooperative. HEENT: Pupils equal, round, and reactive to light. Conjunctivae/corneas clear. Neck: Supple, with full range of motion. Respiratory:  Normal respiratory effort. Clear to auscultation,   Cardiovascular: Regular rate and rhythm with normal S1/S2   Abdomen: Soft, non-tender, non-distended with normal bowel sounds. .  Skin: Bilat LE dressing clean  Neurologic:  Grossly non-focal.  Psychiatric: Alert and oriented, thought content appropriate, normal insight  Capillary Refill: Brisk,< 3 seconds   Peripheral Pulses: +2 palpable, equal bilaterally       Labs:   Recent Labs      08/24/18 2204 08/25/18   0434  08/26/18   0405   WBC  5.8  8.1  7.0   HGB  13.8*  11.5*  8.5*   HCT  40.0*  34.7*  26.0*   PLT  217  198  132     Recent Labs      08/24/18 2204 08/25/18   0434  08/26/18   0405   NA  140  138  135   K  4.8  4.7  4.6   CL  104  105  103   CO2  22*  19*  22*   BUN  41*  35*  15   CREATININE  1.4*  1.2  1.1   CALCIUM  8.8  8.2*  7.6*     Recent Labs      08/24/18 2204   AST  35   ALT  26   BILITOT  0.5   ALKPHOS  59     Recent Labs      08/24/18 2204 08/25/18   0434  08/26/18   0405   INR  1.21*  1.25*  1.18*     Recent Labs      08/25/18   0434   CKTOTAL  566*       Urinalysis:    Lab Results   Component Value Date    NITRU NEGATIVE 08/24/2018    BLOODU NEGATIVE 08/24/2018    SPECGRAV 1.023 08/24/2018       Radiology:      Diet: DIET GENERAL;    DVT prophylaxis: [] Lovenox                                 [] SCDs                                 [] SQ Heparin                                 [] Encourage ambulation           [x] Already on Anticoagulation     Disposition:    [] Home       [] TCU       [] Rehab       [] Psych       [] SNF       [] [place]       [] Other-    Code Status: Full Code    PT/OT Eval Status: y    Assessment/Plan:    Anticipated Discharge in :     MARIFER/Geremias Dennis 1106 Problems    Diagnosis Date Noted    ELLEN (acute kidney injury) ([facility] [address] [N17.9] 08/25/2018    Anticoagulated on Coumadin [Z51.81, Z79.01] 08/24/2018    Displaced intraarticular fracture of right calcaneus, initial encounter for open fracture [S92.061B] 08/24/2018    Open left tibial fracture [S82.202B] 08/24/2018    Open fracture of distal end of left fibula [S82.832B] 08/24/2018    Accidental fall from ladder [J31. XXXA] 08/24/2018    Fall [R51. XXXA]        1. S/p Orif: Multiple fractures. Mgt per primary  2. ELLEN: resolved  3. Pain: pain control  4.  Chronic anticoagulation: On heparin        Electronically signed by Beth Ibarra MD on 8/26/2018 at 1:20 PM

## 2018-08-26 NOTE — BRIEF OP NOTE
Brief Postoperative Note  ______________________________________________________________    Patient: Lori Garnica  YOB: 1955  MRN: [id number]  Date of Procedure: 8/25/2018    Pre-Op Diagnosis: Right open calcaneal fracture. Left open pilon fracture    Post-Op Diagnosis: Same       Procedure(s):  RIGHT CALCANEOUS OPEN REDUCTION INTERNAL FIXATION, CLOSED REDUCTION LEFT PILON FRACTURE WITH APPLICATION OF EXTERNAL FIXATOR, APPLICATION OF WOUND VAC LEFT LEG     Anesthesia: General    Surgeon(s):  Jessica Viera DPM    Assistant: Bonnie Morin PGY I    Staff:  Scrub Person First: Vikas Azul     Estimated Blood Loss: 362  mL    Complications: None    Specimens:   * No specimens in log *    Implants:    Implant Name Type Inv. Item Serial No.  Lot No. LRB No. Used   SCREW SANDRA PT [id number] Screw/Plate/Nail/Carlos SCREW SANDRA PT 4.0X40MM  SMITH    1   SCREW SANDRA PT 4.0X38MM Screw/Plate/Nail/Carlos SCREW SANDRA PT 4.0X38MM  SMITH    2   POST EXTERNAL FIXATOR FREEDOM Hip POST EXTERNAL FIXATOR FREEDOM   SMITH      2         Drains:   Negative Pressure Wound Therapy Leg Inner; Lower (Active)   $ Disposable NPWT <=50 sq cm PER TX $ Yes 8/25/2018  9:29 PM   Wound Type Acute/Traumatic 8/25/2018  9:29 PM   Dressing Type Black foam 8/25/2018  9:29 PM   Cycle Continuous 8/25/2018  9:29 PM   Target Pressure (mmHg) 125 8/25/2018  9:29 PM   Intensity 5 8/25/2018  9:29 PM   Canister changed? Yes 8/25/2018  9:29 PM   Dressing Status Clean;Dry; Intact 8/25/2018  9:29 PM       Findings: consistent with dx    Janice Milan DPM  Date: 8/25/2018  Time: 9:54 PM

## 2018-08-26 NOTE — PROGRESS NOTES
Patient arrive from  pacu via bed, external fixator and wound vac to left leg, splint and ace to right leg. .9 on left ac and INT on right ac. Family at bedside, call light in reach and bed alarm on.

## 2018-08-26 NOTE — PROGRESS NOTES
Angelika Knapp  Daily Progress Note      Pt Name: Leno Sheikh  Medical Record Number: [id number]  Date of Birth 1955   Today's Date: 8/26/2018    HD: # 2    CC: \"Both heels are pretty sore. \"    ASSESSMENT  1. Active Hospital Problems    Diagnosis Date Noted    ELLEN (acute kidney injury) ([facility] [address] [N17.9] 08/25/2018    Anticoagulated on Coumadin [Z51.81, Z79.01] 08/24/2018    Displaced intraarticular fracture of right calcaneus, initial encounter for open fracture [S92.061B] 08/24/2018    Open left tibial fracture [S82.202B] 08/24/2018    Open fracture of distal end of left fibula [S82.832B] 08/24/2018    Accidental fall from ladder [R99. XXXA] 08/24/2018    Fall [C94. XXXA]          PLAN  Admit to [address] [place] [address]     Right open calcaneus fracture & open left tibial and fibula fracture              - Podiatry managing   - s/p ORIF yesterday              - NV checks   -Increase Activity per Podiatry    ID consulted per family request   - continue Ancef  General Diet  Bedrest  Repeat labs in AM including INR  Heparin bridge to Coumadin   Prophylaxis: IS, C&DB, Pepcid, stool softeners   - Added Senna and Miralax  Pain control              - Fentanyl and Percocet PRN  Will need PT and OT eventually  Discharge disposition pending clinical course     Lamont Sparks continues on 7K. He remains stable from a trauma perspective this AM. He is one day s/p ORIF bilateral LE's. Patient's hemoglobin has dropped from 11 to 8.5 this a.m., we will obtain repeat at 1800. Patient admits adequate analgesia at this time. He admits to some bilateral numbness and tingling, but admits for sensation and can wiggle his toes. Bilateral lower extremities are wrapped in Ace bandages. Patient continues on Ancef antibiotics at this time. He is tolerating a general diet this morning, denies a bowel movement at this time but is passing flatus.   Denies any nausea, vomiting, or abdominal discomfort

## 2018-08-26 NOTE — PROGRESS NOTES
Progress Note  8/26/2018 12:29 PM  Subjective:   Admit Date: 8/24/2018  PCP: Emeka Cheung MD    Interval History:   8.26.18  Patient seen at bedside this morning on behalf of Dr. Sapphire Humphreys. Patient is 1 day s/p ORIF right calcaneus and closed reduction left pilon fracture with application of ex-fix and application of wound vac, left leg. Patient reports that his pain is currently well controlled and he slept well overnight. Patient has not had BM since surgery, but is tolerating a diet and is passing flatus. Patient denies calf pain. Patient denies F/V/N/C/SOB/CP. Patient reports numbness and tingling in both feet. No other complaints at this time. HISTORY OF PRESENT ILLNESS:    The patient is a 59 y.o. male with significant past medical history of DVT who presents with bilateral open fractures. Patient states that he was climbing a ladder around 9pm this evening and fell from a height of 10-12 feet. Patient states that he fell directly on his feet onto concrete. Patient denies any other trauma, patient denies LOC. Patient was alone at the time of the injury and yelled for help, patient states a neighbor came to his aid within a few minutes. Patient was then treated by EMS and brought to the ED. Patient reports pain is a 10/10 with pain worse in the left leg.     Diet: DIET GENERAL;  Medications:   Scheduled Meds:   famotidine  20 mg Oral BID    ceFAZolin  2 g Intravenous Q8H    aminoglycoside intermittent dosing (placeholder)   Other RX Placeholder    atorvastatin  40 mg Oral Daily    polyethylene glycol  17 g Oral Daily    senna  2 tablet Oral Nightly    sodium chloride flush  10 mL Intravenous 2 times per day    docusate sodium  100 mg Oral BID     Continuous Infusions:   heparin (porcine) 18 Units/kg/hr (08/26/18 0813)    sodium chloride 50 mL/hr at 08/26/18 0718     PRN Medications: heparin (porcine), heparin (porcine), acetaminophen, bisacodyl, fentanNYL **OR** fentanNYL, oxyCODONE-acetaminophen **OR** oxyCODONE-acetaminophen, magnesium hydroxide, ondansetron, HYDROmorphone, cyclobenzaprine, sodium chloride flush, HYDROmorphone    Objective:   Vitals: /70   Pulse 89   Temp 99 °F (37.2 °C) (Oral)   Resp 16   Ht 5' 4\" (1.626 m)   Wt 175 lb (79.4 kg)   SpO2 94%   BMI 30.04 kg/m²   BMI: Body mass index is 30.04 kg/m². CBC:   Recent Labs      08/24/18 2204 08/25/18 0434 08/26/18 0405   WBC  5.8  8.1  7.0   HGB  13.8*  11.5*  8.5*   PLT  217  198  132     BMP:    Recent Labs      08/24/18 2204 08/25/18 0434 08/26/18 0405   NA  140  138  135   K  4.8  4.7  4.6   CL  104  105  103   CO2  22*  19*  22*   BUN  41*  35*  15   CREATININE  1.4*  1.2  1.1   GLUCOSE  101  106  139*     Hepatic:   Recent Labs      08/24/18 2204   AST  35   ALT  26   BILITOT  0.5   ALKPHOS  59     Troponin: No results for input(s): TROPONINI in the last 72 hours. BNP: No results for input(s): BNP in the last 72 hours. Lipids: No results for input(s): CHOL, HDL in the last 72 hours. Invalid input(s): LDLCALCU  INR:   Recent Labs      08/24/18 2204 08/25/18 0434 08/26/18 0405   INR  1.21*  1.25*  1.18*       Physical Exam:  General Appearance: alert and oriented to person, place and time, in no acute distress  Pulmonary/Chest: normal air movement, no respiratory distress  Abdomen: soft, non-tender, non-distended  Extremities: no cyanosis, clubbing or edema, pulse   Skin: warm and dry, no rash or erythema  Head: normocephalic and atraumatic  Eyes: pupils equal, round, and reactive to light  Neck: supple and non-tender without mass, no thyromegaly   Neurological: alert, oriented, normal speech, no focal findings or movement disorder noted     LOWER EXTREMITY:  Vascular: CFT brisk to exposed digits.     Dermatologic: bilateral long leg splints left intact, no strikethrough noted.  Wound vac intact to LLE, functioning properly.        Neurovascular: Light touch sensation grossly intact at the digits

## 2018-08-27 LAB
ANION GAP SERPL CALCULATED.3IONS-SCNC: 10 MEQ/L (ref 8–16)
APTT: 61.7 SECONDS (ref 22–38)
APTT: 87.7 SECONDS (ref 22–38)
BASOPHILS # BLD: 0.4 %
BASOPHILS ABSOLUTE: 0 THOU/MM3 (ref 0–0.1)
BUN BLDV-MCNC: 8 MG/DL (ref 7–22)
CALCIUM SERPL-MCNC: 8.2 MG/DL (ref 8.5–10.5)
CHLORIDE BLD-SCNC: 99 MEQ/L (ref 98–111)
CO2: 25 MEQ/L (ref 23–33)
CREAT SERPL-MCNC: 1 MG/DL (ref 0.4–1.2)
EOSINOPHIL # BLD: 1.7 %
EOSINOPHILS ABSOLUTE: 0.1 THOU/MM3 (ref 0–0.4)
ERYTHROCYTE [DISTWIDTH] IN BLOOD BY AUTOMATED COUNT: 12.1 % (ref 11.5–14.5)
ERYTHROCYTE [DISTWIDTH] IN BLOOD BY AUTOMATED COUNT: 41.1 FL (ref 35–45)
GFR SERPL CREATININE-BSD FRML MDRD: 76 ML/MIN/1.73M2
GLUCOSE BLD-MCNC: 115 MG/DL (ref 70–108)
HCT VFR BLD CALC: 26 % (ref 42–52)
HEMOGLOBIN: 8.8 GM/DL (ref 14–18)
IMMATURE GRANS (ABS): 0.03 THOU/MM3 (ref 0–0.07)
IMMATURE GRANULOCYTES: 0.4 %
LYMPHOCYTES # BLD: 24.6 %
LYMPHOCYTES ABSOLUTE: 1.9 THOU/MM3 (ref 1–4.8)
MCH RBC QN AUTO: 31.3 PG (ref 26–33)
MCHC RBC AUTO-ENTMCNC: 33.8 GM/DL (ref 32.2–35.5)
MCV RBC AUTO: 92.5 FL (ref 80–94)
MONOCYTES # BLD: 10.2 %
MONOCYTES ABSOLUTE: 0.8 THOU/MM3 (ref 0.4–1.3)
NUCLEATED RED BLOOD CELLS: 0 /100 WBC
PLATELET # BLD: 149 THOU/MM3 (ref 130–400)
PMV BLD AUTO: 9.1 FL (ref 9.4–12.4)
POTASSIUM REFLEX MAGNESIUM: 4 MEQ/L (ref 3.5–5.2)
RBC # BLD: 2.81 MILL/MM3 (ref 4.7–6.1)
SEG NEUTROPHILS: 62.7 %
SEGMENTED NEUTROPHILS ABSOLUTE COUNT: 4.8 THOU/MM3 (ref 1.8–7.7)
SODIUM BLD-SCNC: 134 MEQ/L (ref 135–145)
WBC # BLD: 7.6 THOU/MM3 (ref 4.8–10.8)

## 2018-08-27 PROCEDURE — 97530 THERAPEUTIC ACTIVITIES: CPT

## 2018-08-27 PROCEDURE — 6370000000 HC RX 637 (ALT 250 FOR IP): Performed by: NURSE PRACTITIONER

## 2018-08-27 PROCEDURE — 36415 COLL VENOUS BLD VENIPUNCTURE: CPT

## 2018-08-27 PROCEDURE — APPSS60 APP SPLIT SHARED TIME 46-60 MINUTES: Performed by: NURSE PRACTITIONER

## 2018-08-27 PROCEDURE — 6360000002 HC RX W HCPCS: Performed by: STUDENT IN AN ORGANIZED HEALTH CARE EDUCATION/TRAINING PROGRAM

## 2018-08-27 PROCEDURE — 99232 SBSQ HOSP IP/OBS MODERATE 35: CPT | Performed by: INTERNAL MEDICINE

## 2018-08-27 PROCEDURE — 99232 SBSQ HOSP IP/OBS MODERATE 35: CPT | Performed by: SURGERY

## 2018-08-27 PROCEDURE — 2580000003 HC RX 258: Performed by: STUDENT IN AN ORGANIZED HEALTH CARE EDUCATION/TRAINING PROGRAM

## 2018-08-27 PROCEDURE — 97110 THERAPEUTIC EXERCISES: CPT

## 2018-08-27 PROCEDURE — 85025 COMPLETE CBC W/AUTO DIFF WBC: CPT

## 2018-08-27 PROCEDURE — 80048 BASIC METABOLIC PNL TOTAL CA: CPT

## 2018-08-27 PROCEDURE — 85730 THROMBOPLASTIN TIME PARTIAL: CPT

## 2018-08-27 PROCEDURE — 97542 WHEELCHAIR MNGMENT TRAINING: CPT

## 2018-08-27 PROCEDURE — 6370000000 HC RX 637 (ALT 250 FOR IP): Performed by: SURGERY

## 2018-08-27 PROCEDURE — 1200000003 HC TELEMETRY R&B

## 2018-08-27 PROCEDURE — 6370000000 HC RX 637 (ALT 250 FOR IP): Performed by: STUDENT IN AN ORGANIZED HEALTH CARE EDUCATION/TRAINING PROGRAM

## 2018-08-27 RX ORDER — POLYETHYLENE GLYCOL 3350 17 G/17G
17 POWDER, FOR SOLUTION ORAL 2 TIMES DAILY
Status: DISCONTINUED | OUTPATIENT
Start: 2018-08-27 | End: 2018-08-30 | Stop reason: HOSPADM

## 2018-08-27 RX ORDER — DOCUSATE SODIUM 100 MG/1
100 CAPSULE, LIQUID FILLED ORAL 3 TIMES DAILY
Status: DISCONTINUED | OUTPATIENT
Start: 2018-08-27 | End: 2018-08-30 | Stop reason: HOSPADM

## 2018-08-27 RX ORDER — SENNA PLUS 8.6 MG/1
4 TABLET ORAL NIGHTLY
Status: DISCONTINUED | OUTPATIENT
Start: 2018-08-27 | End: 2018-08-30 | Stop reason: HOSPADM

## 2018-08-27 RX ORDER — CYCLOBENZAPRINE HCL 10 MG
10 TABLET ORAL 3 TIMES DAILY
Status: DISCONTINUED | OUTPATIENT
Start: 2018-08-27 | End: 2018-08-28

## 2018-08-27 RX ADMIN — TRAMADOL HYDROCHLORIDE 50 MG: 50 TABLET, FILM COATED ORAL at 02:40

## 2018-08-27 RX ADMIN — SODIUM CHLORIDE: 9 INJECTION, SOLUTION INTRAVENOUS at 04:44

## 2018-08-27 RX ADMIN — Medication 2 G: at 16:10

## 2018-08-27 RX ADMIN — TRAMADOL HYDROCHLORIDE 50 MG: 50 TABLET, FILM COATED ORAL at 09:08

## 2018-08-27 RX ADMIN — POLYETHYLENE GLYCOL 3350 17 G: 17 POWDER, FOR SOLUTION ORAL at 09:09

## 2018-08-27 RX ADMIN — CYCLOBENZAPRINE 10 MG: 10 TABLET, FILM COATED ORAL at 16:10

## 2018-08-27 RX ADMIN — CYCLOBENZAPRINE 10 MG: 10 TABLET, FILM COATED ORAL at 21:30

## 2018-08-27 RX ADMIN — FAMOTIDINE 20 MG: 20 TABLET ORAL at 21:30

## 2018-08-27 RX ADMIN — DOCUSATE SODIUM 100 MG: 100 CAPSULE, LIQUID FILLED ORAL at 21:31

## 2018-08-27 RX ADMIN — ATORVASTATIN CALCIUM 40 MG: 40 TABLET, FILM COATED ORAL at 09:09

## 2018-08-27 RX ADMIN — POLYETHYLENE GLYCOL 3350 17 G: 17 POWDER, FOR SOLUTION ORAL at 21:30

## 2018-08-27 RX ADMIN — TRAMADOL HYDROCHLORIDE 50 MG: 50 TABLET, FILM COATED ORAL at 21:31

## 2018-08-27 RX ADMIN — CYCLOBENZAPRINE 10 MG: 10 TABLET, FILM COATED ORAL at 04:44

## 2018-08-27 RX ADMIN — OXYCODONE HYDROCHLORIDE AND ACETAMINOPHEN 2 TABLET: 5; 325 TABLET ORAL at 07:53

## 2018-08-27 RX ADMIN — DOCUSATE SODIUM 100 MG: 100 CAPSULE, LIQUID FILLED ORAL at 09:08

## 2018-08-27 RX ADMIN — MAGNESIUM HYDROXIDE 30 ML: 400 SUSPENSION ORAL at 17:31

## 2018-08-27 RX ADMIN — OXYCODONE HYDROCHLORIDE AND ACETAMINOPHEN 2 TABLET: 5; 325 TABLET ORAL at 17:31

## 2018-08-27 RX ADMIN — Medication 2 G: at 08:16

## 2018-08-27 RX ADMIN — FAMOTIDINE 20 MG: 20 TABLET ORAL at 09:08

## 2018-08-27 RX ADMIN — OXYCODONE HYDROCHLORIDE AND ACETAMINOPHEN 2 TABLET: 5; 325 TABLET ORAL at 12:11

## 2018-08-27 RX ADMIN — OXYCODONE HYDROCHLORIDE AND ACETAMINOPHEN 2 TABLET: 5; 325 TABLET ORAL at 03:39

## 2018-08-27 RX ADMIN — HEPARIN SODIUM 17 UNITS/KG/HR: 10000 INJECTION, SOLUTION INTRAVENOUS at 20:02

## 2018-08-27 RX ADMIN — TRAMADOL HYDROCHLORIDE 50 MG: 50 TABLET, FILM COATED ORAL at 16:10

## 2018-08-27 ASSESSMENT — PAIN DESCRIPTION - PROGRESSION
CLINICAL_PROGRESSION: GRADUALLY IMPROVING
CLINICAL_PROGRESSION: GRADUALLY WORSENING
CLINICAL_PROGRESSION: GRADUALLY WORSENING
CLINICAL_PROGRESSION: GRADUALLY IMPROVING

## 2018-08-27 ASSESSMENT — PAIN DESCRIPTION - ORIENTATION
ORIENTATION: RIGHT;LEFT
ORIENTATION: LEFT;RIGHT
ORIENTATION: RIGHT;LEFT
ORIENTATION: LEFT;RIGHT

## 2018-08-27 ASSESSMENT — PAIN DESCRIPTION - PAIN TYPE
TYPE: SURGICAL PAIN

## 2018-08-27 ASSESSMENT — PAIN DESCRIPTION - LOCATION
LOCATION: ANKLE;FOOT
LOCATION: ANKLE;FOOT
LOCATION: ANKLE

## 2018-08-27 ASSESSMENT — PAIN SCALES - GENERAL
PAINLEVEL_OUTOF10: 2
PAINLEVEL_OUTOF10: 2
PAINLEVEL_OUTOF10: 6
PAINLEVEL_OUTOF10: 8
PAINLEVEL_OUTOF10: 5
PAINLEVEL_OUTOF10: 3
PAINLEVEL_OUTOF10: 3
PAINLEVEL_OUTOF10: 7
PAINLEVEL_OUTOF10: 5
PAINLEVEL_OUTOF10: 8
PAINLEVEL_OUTOF10: 5
PAINLEVEL_OUTOF10: 2
PAINLEVEL_OUTOF10: 8
PAINLEVEL_OUTOF10: 5
PAINLEVEL_OUTOF10: 8
PAINLEVEL_OUTOF10: 6

## 2018-08-27 ASSESSMENT — PAIN DESCRIPTION - ONSET
ONSET: ON-GOING

## 2018-08-27 ASSESSMENT — PAIN DESCRIPTION - FREQUENCY
FREQUENCY: CONTINUOUS
FREQUENCY: INTERMITTENT
FREQUENCY: CONTINUOUS
FREQUENCY: CONTINUOUS
FREQUENCY: INTERMITTENT
FREQUENCY: CONTINUOUS

## 2018-08-27 ASSESSMENT — PAIN DESCRIPTION - DESCRIPTORS
DESCRIPTORS: ACHING
DESCRIPTORS: ACHING;DISCOMFORT
DESCRIPTORS: ACHING
DESCRIPTORS: ACHING;DISCOMFORT
DESCRIPTORS: ACHING
DESCRIPTORS: ACHING;DISCOMFORT

## 2018-08-27 NOTE — PROGRESS NOTES
Heparin Consult  Lab Results   Component Value Date    APTT 87.7 08/27/2018     Lab Results   Component Value Date    HGB 8.8 08/27/2018    HCT 26.0 08/27/2018     08/27/2018    INR 1.18 08/26/2018       Current Rate: 17 units/kg/hr    Plan:  Warfarin has not been restarted as of this time  Continue current heparin drip rate at 17 units/kg/hr  Next aPTT: 1645

## 2018-08-27 NOTE — PROGRESS NOTES
Patient is alert and oriented x3. Patient stated pain at a 5 in the left and right ankle, it is aching and continuous. Sensory is intact. Pupils constrict 3-2mm bilaterally. Sclera and conjunctiva is clear and white bilaterally. Hand grasp 2+, bilaterally. Pedal push and pull is nonadherent bilaterally due to bandages and splints in place on both legs. Heart sounds are strong and regular. Cap refill is 2+ bilaterally on fingers and toes. Radial pulse is 2+ bilaterally. Pedal and tibial pulses are nonadherent due to splints and bandages around legs bilaterally. Patient has sensation in toes and can wiggle bilaterally. ROM on upper extremities are full and strong bilaterally. Leg ROM is nonadherent. Patient is on bedrest. Breathing is non labored. Lung sounds are clear and articulate anteriorly and posteriorly bilaterally. Hyperactive bowel sounds in all 4 quadrants. Abdomen is firm and distended. Oral mucosa is pink and moist. Teeth are intact. IV infusing on right arm. IV solution is 0.9% and infusing at 50 ml/hr. Call light is in reach, 3/4 bed rails are up, ID/allergy band is on, and bed is in lowest position.

## 2018-08-27 NOTE — OP NOTE
[address]    [place], [place]  RECORD OF OPERATION    Name Cassie Haile                                                             : 1955  MEDICAL RECORD NO. [id number]    DATE: 2018    Surgeon: Zoie Bolivar DPM    Assist: Zeferino Barrera PGY III, Pura Dwyer PGY II    Pre-operative Diagnosis:    1. Left Open Pilon Fracture   2. Left Fibular Fracture   3. Right Open Calcaneal Fracture    Post-operative Diagnosis:    Same    Procedure:    1. ORIF Open Right Calcaneal Fracture   2. Layered Closure Right Skin laceration 5x1cm   3. Open Reduction Left Pilon Fracture with External fixator application    4. Application NPWT <87KQ sq       Anesthesia: General    Hemostasis: surgicel, electrocautery bovie    Estimated Blood Loss: 100cc    Materials: (3) 4.0mm partially threaded S&N cortico-cancellous screws, (4) 5mm half pins with corresponding jet x delta frame construct with forefoot incorporation    Injectables: n/a    Condition: stable    Complications: none     Specimens: were not obtained    INDICATIONS:  The patient is a 59 y.o. male, who presented to [facility] ED after sustaining a fall from a ladder at home. Patient was noted to have a Gustillo Scottie class 3A open tibial pilon fracture of the left lower extremity, with a concomitant open beak fracture of the right calcaneus. Patient was initially evaluated by the Trauma team, and orthopaedics was consulted for surgical intervention. Patient was noted to be vascularly stable, with application of bilateral posterior splint for fracture stabilization. Patient was admitted to the orthopaedic floor, with consultation to ID for antibiotic recommendations and INR reversal due to chronic coumadin use. Need for surgical intervention was expressed to the patient, highlighting all benefits/risks/complications and alternatives to procedure. Patient was seen pre-operatively. Consent was reviewed and signed, operative limb marked.  All questions and guidance. Upon obtaining satisfactory alignment. The clamps were tightening. A secondary point of fixation consisting of a 5mm forefoot half pin was placed in the medial cuneiform, and fixated to the delta frame construct. 4. Application NPWT <54AH sq  Patient noted to have a 9x3.5mm soft tissue defect to the level of muscle medially along the open tibial fracture. A lacerated great saphenous venin was identified distally, and ligated distally with a small vicryl suture and electro-cautery bovie. Due to the significant soft tissue stripping, need for use of a wound vac was indicated. The periphery of the wound, after washout was prepped with mastisol skin prep, followed by placement of a medium KCI granufoam kit. Patient was noted to have good suction at 125mmhg continuous. Patient tolerated procedure well. Patient was transferred to pacu in stable condition.      Tami MartinezGreenwood [facility] & [address]

## 2018-08-27 NOTE — PROGRESS NOTES
Ice pack placed on infiltrated site as advised by cely ORLANDO. Extremity elevated up with pillows. Educated pt to leave ice pack on for 20 mins and and off 40 mins in an hour. And repeat it for 4 hours. Will continue to monitor.

## 2018-08-27 NOTE — PLAN OF CARE
Problem: Pain:  Goal: Pain level will decrease  Pain level will decrease    Outcome: Met This Shift  Pt report pain at 2 on scale. Pt states oral medication helping to achieve pain goal of a 5 on scale. Problem: Neurological  Goal: Maximum potential motor/sensory/cognitive function  Outcome: Ongoing  Pt  Alert and oriented X4, denies numbness and tingling, noted <3 sec cap refill in BLE. Pt able to wiggle to toes in BLE. Problem: Cardiovascular  Goal: No DVT, peripheral vascular complications  Outcome: Ongoing  Pt  without s/s of DVT. Pt able to take prescribed anticoagulants in place to help prevent development of DVT. Problem: GI  Goal: No bowel complications  Outcome: Ongoing  Pt with hypoactive  bowel sounds, passing flatus, and without nausea. Taking prescribed medications to assist with BM    Problem:   Goal: Adequate urinary output  Outcome: Ongoing  Pt able to void adequate amounts of urine per self without difficulty. Problem: Skin Integrity/Risk  Goal: No skin breakdown during hospitalization  Outcome: Ongoing  Pt remains with dressing on right leg and External fixator and wound vac along with dressing on Left leg. Problem: Musculor/Skeletal Functional Status  Goal: Absence of falls  Outcome: Ongoing  Pt using call light appropriately to call for assistance. Pt is also compliant with use of non-skid slippers. Pt reports understanding of fall prevention when discussed. Problem: Discharge Planning:  Goal: Discharged to appropriate level of care  Discharged to appropriate level of care   Outcome: Ongoing  Pt planing home with home health at discharge.  and  helping with discharge needs. Comments: Care plan reviewed with patient. Patient verbalizes understanding of the plan of care and contribute to goal setting.

## 2018-08-27 NOTE — PROGRESS NOTES
Hospitalist Progress Note    Patient:  Miss[place] Sindhu      Unit/Bed:7K-10/010-A    YOB: 1955    MRN: [id number]       Acct: [de-identified]     PCP: Javi Sanderson MD    Date of Admission: 8/24/2018    Chief Complaint: fall, open fractures    Hospital Course: Pt is a 60 y/o who fell off a ladder. Suffered LE open fractures. Admitted by trauma, s/p ORIF. On antibiotics. Pain concerning but tolerable. Tolerating orals. Pt was on chronic anticoagulations. Reversed, now on heparin. Continue mgt    Subjective: c/o pain       Medications:  Reviewed    Infusion Medications    heparin (porcine) 17 Units/kg/hr (08/27/18 0026)    sodium chloride 50 mL/hr at 08/27/18 0444     Scheduled Medications    cyclobenzaprine  10 mg Oral TID    docusate sodium  100 mg Oral TID    polyethylene glycol  17 g Oral BID    senna  4 tablet Oral Nightly    traMADol  50 mg Oral Q6H    famotidine  20 mg Oral BID    ceFAZolin  2 g Intravenous Q8H    aminoglycoside intermittent dosing (placeholder)   Other RX Placeholder    atorvastatin  40 mg Oral Daily    sodium chloride flush  10 mL Intravenous 2 times per day     PRN Meds: acetaminophen, bisacodyl, fentanNYL **OR** fentanNYL, oxyCODONE-acetaminophen **OR** oxyCODONE-acetaminophen, magnesium hydroxide, ondansetron, HYDROmorphone, sodium chloride flush, HYDROmorphone      Intake/Output Summary (Last 24 hours) at 08/27/18 1210  Last data filed at 08/27/18 0800   Gross per 24 hour   Intake             2472 ml   Output             1300 ml   Net             1172 ml       Diet:  DIET GENERAL;    Exam:  /78   Pulse 86   Temp 98.3 °F (36.8 °C) (Oral)   Resp 10   Ht 5' 4\" (1.626 m)   Wt 175 lb (79.4 kg)   SpO2 94%   BMI 30.04 kg/m²     General appearance: No apparent distress, appears stated age and cooperative. HEENT: Pupils equal, round, and reactive to light. Conjunctivae/corneas clear. Neck: Supple, with full range of motion.   Respiratory:  Normal respiratory effort. Clear to auscultation,   Cardiovascular: Regular rate and rhythm with normal S1/S2   Abdomen: Soft, non-tender, non-distended with normal bowel sounds. .  Skin: Bilat LE dressing clean  Neurologic:  Grossly non-focal.  Psychiatric: Alert and oriented, thought content appropriate, normal insight  Capillary Refill: Brisk,< 3 seconds   Peripheral Pulses: +2 palpable, equal bilaterally       Labs:   Recent Labs      08/25/18   0434  08/26/18   0405  08/26/18   1834  08/27/18   0813   WBC  8.1  7.0   --   7.6   HGB  11.5*  8.5*  8.1*  8.8*   HCT  34.7*  26.0*  23.5*  26.0*   PLT  198  132   --   149     Recent Labs      08/25/18   0434  08/26/18   0405  08/27/18   0820   NA  138  135  134*   K  4.7  4.6  4.0   CL  105  103  99   CO2  19*  22*  25   BUN  35*  15  8   CREATININE  1.2  1.1  1.0   CALCIUM  8.2*  7.6*  8.2*     Recent Labs      08/24/18   2204   AST  35   ALT  26   BILITOT  0.5   ALKPHOS  59     Recent Labs      08/24/18   2204  08/25/18   0434  08/26/18   0405   INR  1.21*  1.25*  1.18*     Recent Labs      08/25/18   0434   CKTOTAL  566*       Urinalysis:      Lab Results   Component Value Date    NITRU NEGATIVE 08/24/2018    BLOODU NEGATIVE 08/24/2018    SPECGRAV 1.023 08/24/2018       Radiology:      Diet: DIET GENERAL;    DVT prophylaxis: [] Lovenox                                 [] SCDs                                 [] SQ Heparin                                 [] Encourage ambulation           [x] Already on Anticoagulation     Disposition:    [] Home       [] TCU       [] Rehab       [] Psych       [] SNF       [] [place]       [] Other-    Code Status: Full Code    PT/OT Eval Status: y    Assessment/Plan:    Anticipated Discharge in :     MARIFER/Geremias Dennis [address] Problems    Diagnosis Date Noted    ELLEN (acute kidney injury) ([facility] [address] [N17.9] 08/25/2018    Anticoagulated on Coumadin [Z51.81, Z79.01] 08/24/2018    Displaced intraarticular fracture of right calcaneus, initial

## 2018-08-27 NOTE — PROGRESS NOTES
LFA warm and edema from peripheral IV infiltrate. Patient had Heparin and Ancef infusions throughout the night prior to/during the infiltrate. Called pharmacy who recommends cool compress for heparin or warm compress for Ancef. Student nurse informed to elevate LFA and ice compress x20 minutes hourly x4.

## 2018-08-27 NOTE — PROGRESS NOTES
non-tender, non-distended  Extremities: no cyanosis, clubbing or edema, pulse   Skin: warm and dry, no rash or erythema  Head: normocephalic and atraumatic  Eyes: pupils equal, round, and reactive to light  Neck: supple and non-tender without mass, no thyromegaly   Neurological: alert, oriented, normal speech, no focal findings or movement disorder noted     LOWER EXTREMITY:  Vascular: CFT brisk to exposed digits.     Dermatologic: bilateral long leg splints left intact, no strikethrough noted. Wound vac intact to LLE, functioning properly.        Neurovascular: Light touch sensation grossly intact at the digits bilaterally. Patient able to dorsiflex and plantarflex toes, shannon.       Musculoskeletal: Muscle strength deferred. External fixator intact to LLE. Foot and ankle in rectus alignment, shannon. Assessment and Plan:   Patient Active Problem List:     Anticoagulated on Coumadin     Fall     Displaced intraarticular fracture of right calcaneus, initial encounter for open fracture     Open left tibial fracture     Open fracture of distal end of left fibula     Accidental fall from ladder     ELLEN (acute kidney injury) ([facility] [address]    PLAN:     1. s/p ORIF right calcaneus and closed reduction left pilon fracture with application of ex-fix and application of wound vac, left leg.   -Continue with current pain regimen, added Tramadol on 8.26.18   -Continue with IV antbiotics, ID following  -Bilateral long leg splints intact, wound vac to LLE functioning  -Wound ostomy to lundy vac on 8.28.18   -Continue strict bedrest, continue with PT/OT, upper extremity activities as tolerated   -Consult to case management for discharge planning, possible rehab facility or home health     2. History of DVT   - H&H, PT/INR labs reviewed, hemoglobin 8.8, will continue to monitor   -Pharmacy to dose heparin, coumadin bridging    Dispo: Patient 2 day post-op will continue with current pain control. Pharmacy dosing heparin, coumadin bridging. Wound Ostomy to change wound vac tomorrow 8.28.18. Case management consulted to begin discussion for discharge planning. Based on patient's immobility due to bilateral lower extremity fractures, patient would benefit from a rehab or nursing facility / TCU. Consult to home care needs and wound vac paperwork started if patient is discharged to home. Electronically signed by Shannon Rodas DPM on 8/27/2018 at 9:46 AM     Please refer to resident physicians note for further details. Discussed with resident physician assessment and findings, I agree with plan as documented.       Kisha Salazar [facility]   Electronically signed by Cuauhtemoc Herrera DPM on 8/27/2018 at 11:47 AM

## 2018-08-27 NOTE — PROGRESS NOTES
Tosha Erickson  Daily Progress Note      Pt Name: Cedric Stacy  Medical Record Number: [id number]  Date of Birth 1955   Today's Date: 8/27/2018    HD: # 3    CC: \"I keep having spasms. \"    ASSESSMENT  1. Active Hospital Problems    Diagnosis Date Noted    ELLEN (acute kidney injury) ([facility] [address] [N17.9] 08/25/2018    Anticoagulated on Coumadin [Z51.81, Z79.01] 08/24/2018    Displaced intraarticular fracture of right calcaneus, initial encounter for open fracture [S92.061B] 08/24/2018    Open left tibial fracture [S82.202B] 08/24/2018    Open fracture of distal end of left fibula [S82.832B] 08/24/2018    Accidental fall from ladder [L10. XXXA] 08/24/2018    Fall [Q54. XXXA]        PROCEDURES  08/25/18 - 1. ORIF Open Right Calcaneal Fracture  2. Layered Closure Right Skin laceration 5x1cm  3. Open Reduction Left Pilon Fracture with External fixator application  4. Application NPWT <07EJ sq      PLAN  Continue care on 7K     Right open calcaneus fracture & open left tibial and fibula fracture              - Podiatry managing   - s/p ORIF 8/25              - NV checks   - NWB to BLE   - awaiting plan for next surgery etc   - wound vac changes T, Th, Sat    ID consulted per family request   - continue Ancef    General Diet  Up to chair, NWB BLE  Repeat labs in AM including INR  Heparin bridge to Coumadin   Prophylaxis: IS, C&DB, Pepcid, stool softeners  Pain control              - Fentanyl and Percocet PRN  PT/OT eval and treat  Discharge disposition pending clinical course   - home with home health vs TCU/ECF    - pending PT and OT evals and their recommendations     SUBJECTIVE  Lucsamne Power continues on 7K. He remains stable from a trauma perspective this AM. Patient's hemoglobin remains stable in 8 range. Patient admits adequate analgesia at this time. Denies any numbness or tingling today, does admit to spasms however.   Bilateral lower extremities are wrapped in Ace bandages. Patient continues on Ancef antibiotics at this time. He is tolerating a general diet this morning, denies a bowel movement at this time but is passing flatus. Denies any nausea, vomiting, or abdominal discomfort at this time. Will be getting up to wheelchair with therapy, maintaining no weight to either lower extremity. Planning home with home health, if physically able. Patient concerned about \"being too much\" for his wife to care for at home. Wt Readings from Last 3 Encounters:   08/25/18 175 lb (79.4 kg)   11/26/12 165 lb (74.8 kg)     Temp Readings from Last 3 Encounters:   08/27/18 98.3 °F (36.8 °C) (Oral)   11/26/12 98 °F (36.7 °C) (Oral)     BP Readings from Last 3 Encounters:   08/27/18 122/78   08/25/18 (!) 103/58   11/26/12 (!) 147/100     Pulse Readings from Last 3 Encounters:   08/27/18 86   11/26/12 63       24 HR INTAKE/OUTPUT :     Intake/Output Summary (Last 24 hours) at 08/27/18 1019  Last data filed at 08/27/18 0800   Gross per 24 hour   Intake             2472 ml   Output             1300 ml   Net             1172 ml   BM = 0      DIET GENERAL;    OBJECTIVE  CURRENT VITALS /78   Pulse 86   Temp 98.3 °F (36.8 °C) (Oral)   Resp 10   Ht 5' 4\" (1.626 m)   Wt 175 lb (79.4 kg)   SpO2 94%   BMI 30.04 kg/m²        GENERAL: alert, cooperative, no distress  LUNGS: clear to auscultation bilaterally- no wheezes, rales or rhonchi, normal air movement, no respiratory distress  HEART: normal rate, normal S1 and S2, no gallops, intact distal pulses  ABDOMEN: soft, non-tender, non-distended, normal bowel sounds, no masses or organomegaly  WOUNDS: Bilateral lower extremities with Ace wraps. Posterior splint to RLE. External fixator to LLE. Surgical wounds not visualized. EXTREMITY: no cyanosis, no clubbing and no edema to exposed areas. Toes pink, warm and dry. Capillary refill < 3seconds.  LE's elevated       LABS  CBC :   Recent Labs      08/25/18   0434  08/26/18   0405

## 2018-08-27 NOTE — CARE COORDINATION
DISCHARGE BARRIERS  8/27/18, 1:24 PM    Reason for Referral:  ecf placement   Mental Status: alert, answers questions appropriately  Decision Making: makes own decisions   Family/Social/Home Environment:  Shelia Flowers lives at home with his wife. He has been independent with his care prior to admission. His wife is able to offer some assistance at home. Current Services: none   Current Equipment: none   Payment Source: [facility] [place]  Concerns or Barriers to Discharge: prefers TCU  [facility] of ECF/HH were provided: declined list, prefers TCU, second choice is [facility]     Teach Back Method used with patient regarding care plan and discharge plan  Patient and wife verbalize understanding of the plan of care and contribute to goal setting. Anticipated Needs/Discharge Plan: spoke with Shelia Flowers and his wife. They prefer TCU, second choice is [facility]. Discussed with care manager, who shares that TCU anticipates accepting at discharge.      Electronically signed by LIUDMILA Corey on 8/27/2018 at 1:24 PM

## 2018-08-27 NOTE — PLAN OF CARE
Problem: Pain:  Goal: Pain level will decrease  Pain level will decrease   Outcome: Ongoing  Pain goal is 3/10. Pain has improved and patient is able to rest.     Problem: Neurological  Goal: Maximum potential motor/sensory/cognitive function  Outcome: Ongoing  Alert and bale to move all extremities. Weakness to bilateral lower legs. Problem: Cardiovascular  Goal: No DVT, peripheral vascular complications  Outcome: Ongoing  Denies chest pain. Telemetry monitor remains in place. Remains on a heparin drip. Problem: GI  Goal: No bowel complications  Outcome: Ongoing  No bowel movement since admission, stool softeners given. Problem:   Goal: Adequate urinary output  Outcome: Ongoing  Voiding an adequate amount. Problem: Skin Integrity/Risk  Goal: No skin breakdown during hospitalization  Outcome: Ongoing  Bilateral legs with incisions. Encourage patient to reposition in bed. Problem: Musculor/Skeletal Functional Status  Goal: Highest potential functional level  Outcome: Ongoing  Patient is on bedrest at this time. Goal: Absence of falls  Outcome: Ongoing  Patient not trying to get up on his own. Bed alarm on. Problem: Discharge Planning:  Goal: Discharged to appropriate level of care  Discharged to appropriate level of care   Outcome: Ongoing  Will need rehab or ecf at discharge. Comments: Care plan reviewed with patient and family. Patient and family verbalize understanding of the plan of care and contribute to goal setting.

## 2018-08-27 NOTE — PROGRESS NOTES
[address] [address]  INPATIENT PHYSICAL THERAPY  DAILY NOTE  [facility] ORTHOPEDICS 7K - 7K-10/010-A    Time In: 4722  Time Out: 1140  Timed Code Treatment Minutes: 43 Minutes  Minutes: 53          Date: 2018  Patient Name: Ana Mesa,  Gender:  male        MRN: [id number]  : 1955  (59 y.o.)  Referral Date : 18  Referring Practitioner: PADMINI Yang MD  Diagnosis: fall from ladder, initial encounter  Additional Pertinent Hx: Toñito Forbes is a 59year old male presenting to the Emergency Department via EMS for evaluation of potential injuries sustained in a fall from a ladder this evening. He reports that he was taking down a wreath when he fell from the ladder, landing directly onto his feet. He denies hitting his head or losing consciousness. Denies headache, visual changes, paresthesias, nausea or vomiting. Had immediate excruciating bilateral ankle pain that has subsided slightly after fentanyl administration in EMS. Presents to the ER with bilateral air splints from the EMS to his ankles/lower legs. Pulses are palpable. Wound noted to right posterior ankle/heel with no obvious bone protruding. Left medial ankle noted to have wound with distal end of tibia protruding.        Past Medical History:   Diagnosis Date    Embolism - blood clot     rt calf and groin    Hyperlipidemia      Past Surgical History:   Procedure Laterality Date    HIP SURGERY      lt    [place] OFFICE/OUTPT VISIT,PROCEDURE ONLY Right 2018    OPEN REDUCTION INTERNAL FIXATION OF PILON FRACTURE WITH EXTERNAL FIXATOR AND WOUND VAC PLACEMENT, RIGHT CALCANEOUS OPEN REDUCTION INTERNAL FIXATION performed by Yee Rankin DPM at [facility]       Restrictions/Precautions:  Weight Bearing, General Precautions, Fall Risk     Right Lower Extremity Weight Bearing: Non Weight Bearing     Left Lower Extremity Weight Bearing: Non Weight Bearing        Other position/activity restrictions: wound vac       Prior Level of Function:  ADL Tolerance: Patient limited by fatigue;Patient limited by pain; Patient limited by endurance    Assessment: Body structures, Functions, Activity limitations: Decreased functional mobility , Decreased ROM, Decreased ADL status, Decreased strength, Decreased safe awareness, Decreased endurance, Decreased balance  Assessment: Pt participated well with bed mobility and transfers with slideboard in/out of wheelchair, and wheelchair mobility. Pt is very pleasant and motivated. Pt would benefit from continued skilled PT to further address mobility training for safety with transfers including in/out of vehicle and w/c mobility trng. Prognosis: Excellent, Good     REQUIRES PT FOLLOW UP: Yes  Discharge Recommendations: Continue to assess pending progress, Patient would benefit from continued therapy after discharge    Patient Education:  Patient Education: slideboard transfer, wheelchair management    Equipment Recommendations:  Equipment Needed: Yes  Other: will likely need w/c and slideboard    Safety:  Type of devices:  All fall risk precautions in place, Call light within reach, Gait belt, Patient at risk for falls, Left in bed, Nurse notified    Plan:  Times per week: 7x T  Times per day: Daily  Specific instructions for Next Treatment: ther ex, ther act, transfers, w/c mobility  Current Treatment Recommendations: Strengthening, Balance Training, Functional Mobility Training, Home Exercise Program, Safety Education & Training, Patient/Caregiver Education & Training, Equipment Evaluation, Education, & procurement, Endurance Training, Transfer Training, Wheelchair Mobility Training    Goals:  Patient goals : gain independence    Short term goals  Time Frame for Short term goals: 2 weeks  Short term goal 1: patient to perform sit to and from supine at S with maintenance of NWB status to get in and out of bed  Short term goal 2: patient to tolerate 20 minutes at EOB at S for increased tolerance to activity  Short term goal 3:

## 2018-08-28 PROBLEM — D62 ACUTE BLOOD LOSS AS CAUSE OF POSTOPERATIVE ANEMIA: Status: ACTIVE | Noted: 2018-08-28

## 2018-08-28 PROBLEM — Z86.718 H/O DEEP VENOUS THROMBOSIS: Status: ACTIVE | Noted: 2018-08-28

## 2018-08-28 LAB
ABO: NORMAL
ABSOLUTE RETIC #: 78 THOU/MM3 (ref 20–115)
ANION GAP SERPL CALCULATED.3IONS-SCNC: 11 MEQ/L (ref 8–16)
ANTIBODY SCREEN: NORMAL
APTT: 75 SECONDS (ref 22–38)
APTT: 83.8 SECONDS (ref 22–38)
BUN BLDV-MCNC: 11 MG/DL (ref 7–22)
CALCIUM SERPL-MCNC: 7.9 MG/DL (ref 8.5–10.5)
CHLORIDE BLD-SCNC: 98 MEQ/L (ref 98–111)
CO2: 26 MEQ/L (ref 23–33)
CREAT SERPL-MCNC: 1 MG/DL (ref 0.4–1.2)
ERYTHROCYTE [DISTWIDTH] IN BLOOD BY AUTOMATED COUNT: 12.2 % (ref 11.5–14.5)
ERYTHROCYTE [DISTWIDTH] IN BLOOD BY AUTOMATED COUNT: 42 FL (ref 35–45)
FERRITIN: 109 NG/ML (ref 22–322)
FOLATE: 6.8 NG/ML (ref 4.8–24.2)
GFR SERPL CREATININE-BSD FRML MDRD: 76 ML/MIN/1.73M2
GLUCOSE BLD-MCNC: 108 MG/DL (ref 70–108)
HCT VFR BLD CALC: 22.1 % (ref 42–52)
HCT VFR BLD CALC: 22.5 % (ref 42–52)
HEMOGLOBIN: 7.4 GM/DL (ref 14–18)
HEMOGLOBIN: 7.6 GM/DL (ref 14–18)
IMMATURE RETIC FRACT: 26.9 % (ref 2.3–13.4)
IRON: 14 UG/DL (ref 65–195)
MCH RBC QN AUTO: 31.5 PG (ref 26–33)
MCHC RBC AUTO-ENTMCNC: 33.5 GM/DL (ref 32.2–35.5)
MCV RBC AUTO: 94 FL (ref 80–94)
PLATELET # BLD: 153 THOU/MM3 (ref 130–400)
PMV BLD AUTO: 9.5 FL (ref 9.4–12.4)
POTASSIUM SERPL-SCNC: 4.2 MEQ/L (ref 3.5–5.2)
RBC # BLD: 2.35 MILL/MM3 (ref 4.7–6.1)
RETIC HEMOGLOBIN: 30.7 PG (ref 28.2–35.7)
RETICULOCYTE ABSOLUTE COUNT: 3.2 % (ref 0.5–2)
RH FACTOR: NORMAL
SODIUM BLD-SCNC: 135 MEQ/L (ref 135–145)
TOTAL IRON BINDING CAPACITY: 192 UG/DL (ref 171–450)
VITAMIN B-12: 633 PG/ML (ref 211–911)
WBC # BLD: 6.9 THOU/MM3 (ref 4.8–10.8)

## 2018-08-28 PROCEDURE — 97605 NEG PRS WND THER DME<=50SQCM: CPT

## 2018-08-28 PROCEDURE — 97530 THERAPEUTIC ACTIVITIES: CPT

## 2018-08-28 PROCEDURE — 6370000000 HC RX 637 (ALT 250 FOR IP): Performed by: NURSE PRACTITIONER

## 2018-08-28 PROCEDURE — G8987 SELF CARE CURRENT STATUS: HCPCS

## 2018-08-28 PROCEDURE — 1200000003 HC TELEMETRY R&B

## 2018-08-28 PROCEDURE — 36415 COLL VENOUS BLD VENIPUNCTURE: CPT

## 2018-08-28 PROCEDURE — 86850 RBC ANTIBODY SCREEN: CPT

## 2018-08-28 PROCEDURE — G8988 SELF CARE GOAL STATUS: HCPCS

## 2018-08-28 PROCEDURE — 85018 HEMOGLOBIN: CPT

## 2018-08-28 PROCEDURE — 2580000003 HC RX 258: Performed by: STUDENT IN AN ORGANIZED HEALTH CARE EDUCATION/TRAINING PROGRAM

## 2018-08-28 PROCEDURE — 6370000000 HC RX 637 (ALT 250 FOR IP): Performed by: SURGERY

## 2018-08-28 PROCEDURE — 6370000000 HC RX 637 (ALT 250 FOR IP): Performed by: STUDENT IN AN ORGANIZED HEALTH CARE EDUCATION/TRAINING PROGRAM

## 2018-08-28 PROCEDURE — 6360000002 HC RX W HCPCS: Performed by: STUDENT IN AN ORGANIZED HEALTH CARE EDUCATION/TRAINING PROGRAM

## 2018-08-28 PROCEDURE — 82607 VITAMIN B-12: CPT

## 2018-08-28 PROCEDURE — 85730 THROMBOPLASTIN TIME PARTIAL: CPT

## 2018-08-28 PROCEDURE — 83540 ASSAY OF IRON: CPT

## 2018-08-28 PROCEDURE — 82728 ASSAY OF FERRITIN: CPT

## 2018-08-28 PROCEDURE — 97110 THERAPEUTIC EXERCISES: CPT

## 2018-08-28 PROCEDURE — 85027 COMPLETE CBC AUTOMATED: CPT

## 2018-08-28 PROCEDURE — 86900 BLOOD TYPING SEROLOGIC ABO: CPT

## 2018-08-28 PROCEDURE — 80048 BASIC METABOLIC PNL TOTAL CA: CPT

## 2018-08-28 PROCEDURE — APPSS180 APP SPLIT SHARED TIME > 60 MINUTES: Performed by: PHYSICIAN ASSISTANT

## 2018-08-28 PROCEDURE — 99232 SBSQ HOSP IP/OBS MODERATE 35: CPT | Performed by: SURGERY

## 2018-08-28 PROCEDURE — 83550 IRON BINDING TEST: CPT

## 2018-08-28 PROCEDURE — 85046 RETICYTE/HGB CONCENTRATE: CPT

## 2018-08-28 PROCEDURE — 82746 ASSAY OF FOLIC ACID SERUM: CPT

## 2018-08-28 PROCEDURE — 85014 HEMATOCRIT: CPT

## 2018-08-28 PROCEDURE — 97166 OT EVAL MOD COMPLEX 45 MIN: CPT

## 2018-08-28 PROCEDURE — 86901 BLOOD TYPING SEROLOGIC RH(D): CPT

## 2018-08-28 RX ORDER — CYCLOBENZAPRINE HCL 10 MG
10 TABLET ORAL 3 TIMES DAILY
Status: DISCONTINUED | OUTPATIENT
Start: 2018-08-28 | End: 2018-08-30

## 2018-08-28 RX ADMIN — SENNOSIDES 34.4 MG: 8.6 TABLET, FILM COATED ORAL at 00:41

## 2018-08-28 RX ADMIN — DOCUSATE SODIUM 100 MG: 100 CAPSULE, LIQUID FILLED ORAL at 21:35

## 2018-08-28 RX ADMIN — OXYCODONE HYDROCHLORIDE AND ACETAMINOPHEN 2 TABLET: 5; 325 TABLET ORAL at 06:38

## 2018-08-28 RX ADMIN — FAMOTIDINE 20 MG: 20 TABLET ORAL at 08:18

## 2018-08-28 RX ADMIN — TRAMADOL HYDROCHLORIDE 50 MG: 50 TABLET, FILM COATED ORAL at 03:08

## 2018-08-28 RX ADMIN — Medication 2 G: at 16:15

## 2018-08-28 RX ADMIN — CYCLOBENZAPRINE 10 MG: 10 TABLET, FILM COATED ORAL at 20:19

## 2018-08-28 RX ADMIN — OXYCODONE HYDROCHLORIDE AND ACETAMINOPHEN 2 TABLET: 5; 325 TABLET ORAL at 16:15

## 2018-08-28 RX ADMIN — FAMOTIDINE 20 MG: 20 TABLET ORAL at 21:35

## 2018-08-28 RX ADMIN — CYCLOBENZAPRINE 10 MG: 10 TABLET, FILM COATED ORAL at 14:52

## 2018-08-28 RX ADMIN — POLYETHYLENE GLYCOL 3350 17 G: 17 POWDER, FOR SOLUTION ORAL at 21:35

## 2018-08-28 RX ADMIN — OXYCODONE HYDROCHLORIDE AND ACETAMINOPHEN 2 TABLET: 5; 325 TABLET ORAL at 00:23

## 2018-08-28 RX ADMIN — HEPARIN SODIUM 17 UNITS/KG/HR: 10000 INJECTION, SOLUTION INTRAVENOUS at 17:07

## 2018-08-28 RX ADMIN — HYDROMORPHONE HYDROCHLORIDE 1 MG: 1 INJECTION, SOLUTION INTRAMUSCULAR; INTRAVENOUS; SUBCUTANEOUS at 08:11

## 2018-08-28 RX ADMIN — OXYCODONE HYDROCHLORIDE AND ACETAMINOPHEN 2 TABLET: 5; 325 TABLET ORAL at 11:56

## 2018-08-28 RX ADMIN — POLYETHYLENE GLYCOL 3350 17 G: 17 POWDER, FOR SOLUTION ORAL at 08:18

## 2018-08-28 RX ADMIN — CYCLOBENZAPRINE 10 MG: 10 TABLET, FILM COATED ORAL at 10:48

## 2018-08-28 RX ADMIN — DOCUSATE SODIUM 100 MG: 100 CAPSULE, LIQUID FILLED ORAL at 08:18

## 2018-08-28 RX ADMIN — SENNOSIDES 34.4 MG: 8.6 TABLET, FILM COATED ORAL at 21:35

## 2018-08-28 RX ADMIN — DOCUSATE SODIUM 100 MG: 100 CAPSULE, LIQUID FILLED ORAL at 16:15

## 2018-08-28 RX ADMIN — Medication 2 G: at 00:35

## 2018-08-28 RX ADMIN — ATORVASTATIN CALCIUM 40 MG: 40 TABLET, FILM COATED ORAL at 10:48

## 2018-08-28 RX ADMIN — OXYCODONE HYDROCHLORIDE AND ACETAMINOPHEN 2 TABLET: 5; 325 TABLET ORAL at 20:19

## 2018-08-28 RX ADMIN — Medication 2 G: at 08:25

## 2018-08-28 RX ADMIN — TRAMADOL HYDROCHLORIDE 50 MG: 50 TABLET, FILM COATED ORAL at 10:48

## 2018-08-28 ASSESSMENT — PAIN SCALES - GENERAL
PAINLEVEL_OUTOF10: 7
PAINLEVEL_OUTOF10: 7
PAINLEVEL_OUTOF10: 5
PAINLEVEL_OUTOF10: 7
PAINLEVEL_OUTOF10: 0
PAINLEVEL_OUTOF10: 7
PAINLEVEL_OUTOF10: 0
PAINLEVEL_OUTOF10: 0
PAINLEVEL_OUTOF10: 7
PAINLEVEL_OUTOF10: 7
PAINLEVEL_OUTOF10: 5
PAINLEVEL_OUTOF10: 7
PAINLEVEL_OUTOF10: 4
PAINLEVEL_OUTOF10: 3
PAINLEVEL_OUTOF10: 5

## 2018-08-28 ASSESSMENT — PAIN DESCRIPTION - PAIN TYPE
TYPE: SURGICAL PAIN
TYPE: SURGICAL PAIN

## 2018-08-28 ASSESSMENT — PAIN DESCRIPTION - PROGRESSION: CLINICAL_PROGRESSION: GRADUALLY IMPROVING

## 2018-08-28 ASSESSMENT — PAIN DESCRIPTION - LOCATION
LOCATION: ANKLE
LOCATION: ANKLE

## 2018-08-28 ASSESSMENT — PAIN DESCRIPTION - ORIENTATION
ORIENTATION: RIGHT;LEFT
ORIENTATION: RIGHT;LEFT

## 2018-08-28 ASSESSMENT — PAIN DESCRIPTION - FREQUENCY: FREQUENCY: INTERMITTENT

## 2018-08-28 ASSESSMENT — PAIN DESCRIPTION - DESCRIPTORS: DESCRIPTORS: ACHING

## 2018-08-28 ASSESSMENT — PAIN DESCRIPTION - ONSET: ONSET: ON-GOING

## 2018-08-28 NOTE — PLAN OF CARE
Problem: Skin Integrity/Risk  Goal: No skin breakdown during hospitalization  Outcome: Ongoing  Into change NPWT left medial leg wound. Gavin Thomas with podiatry in room to assist with change. Podiatry removed dressing prior to wound ostomy assessing. Removed wraps and dry dressing. Robinson Asher painted the pin sites with betadine. Cleansed and dried wound to right lateral leg. Wound bed noted to be 80% red and 20% black. An incisional line with 2 sutures at 3 oclock of the wound. Wound measured with incisional line approximately 10 cmx 9 cm. Applied skin prep and premium wafer to polly wound. Black foam applied to wound bed, secured with transparent drape, and suction noted at 125. Assisted Robinson Asher, with the application of kerlix, splint,  surepress padding and ACE wraps. Elevated bilateral heels. Plans for podiatry to change right leg dressing tomorrow. Will continue to follow for wound VAC. Also, at this time reassess patients IV infiltrate to left arm. The swelling has noted to improve and no bruising noted. Care plan reviewed with patient and RN. Patient and RN verbalize understanding of the plan of care and contribute to goal setting.       Comments:

## 2018-08-28 NOTE — PROGRESS NOTES
Progress Note  8/28/2018 8:57 AM  Subjective:   Admit Date: 8/24/2018  PCP: Maria Dolores Moon MD    Interval History:     8.28.18  Patient is seen bedside on behalf of Dr. Igor Valerio. Patient is POD 3  s/p ORIF right calcaneus and closed reduction left pilon fracture with application of ex-fix and application of wound vac, left leg (DOS: 8.25.18). He relates he has been working with PT/OT. He states that the pain will come in spurts but he is tolerating well overall. Denies SOB, C, N, V, chest pain. 8.27.18   Patient seen at bedside this morning on behalf of Dr. Jomar Gonzáles. Patient is 2 days s/p ORIF right calcaneus and closed reduction left pilon fracture with application of ex-fix and application of wound vac, left leg (DOS: 8.25.18). Patient reports pain is well controlled with combination of Tramadol and Percocet. Patient and family express concern about discharge planning and would like to explore options for rehab/nursing facility. Patient states he worked with PT/OT yesterday on maneuvering in the bed and arm exercises with a resistance band. Patient denies F/V/N/C/SOB/CP.     8.26.18  Patient seen at bedside this morning on behalf of Dr. Igor Valerio. Patient is 1 day s/p ORIF right calcaneus and closed reduction left pilon fracture with application of ex-fix and application of wound vac, left leg. Patient reports that his pain is currently well controlled and he slept well overnight. Patient has not had BM since surgery, but is tolerating a diet and is passing flatus. Patient denies calf pain. Patient denies F/V/N/C/SOB/CP. Patient reports numbness and tingling in both feet. No other complaints at this time. HISTORY OF PRESENT ILLNESS:    The patient is a 59 y.o. male with significant past medical history of DVT who presents with bilateral open fractures. Patient states that he was climbing a ladder around 9pm this evening and fell from a height of 10-12 feet.  Patient states that he fell directly on his feet

## 2018-08-28 NOTE — PROGRESS NOTES
Heparin Consult  Lab Results   Component Value Date    APTT 83.8 08/28/2018     Lab Results   Component Value Date    HGB 7.6 08/28/2018    HCT 22.5 08/28/2018     08/28/2018    INR 1.18 08/26/2018       Current Rate: 17 units/kg/hr    Plan:  Rate: continue at 17 units/kg/hr  Next aPTT: 0445 8/29/18 as previously ordered    Laura Mejia RPh  8/28/2018  5:59 PM

## 2018-08-28 NOTE — PROGRESS NOTES
rehabilitation services?: Yes    Subjective: RN okayed OT session. Pt was agreeable to OT session.  Pleasant and Cooperative    General:  Overall Orientation Status: Within Normal Limits    Vision: Within Functional Limits    Hearing: Within functional limits    Pain:  Pain Assessment  Patient Currently in Pain: Yes  Pain Assessment: 0-10  Pain Level: 7  Pain Type: Surgical pain  Pain Location: Ankle  Pain Orientation: Right;Left       Social/Functional History:  Lives With: Spouse  Type of Home: House  Home Layout: Two level, Able to Live on Main level with bedroom/bathroom  Home Access: Stairs to enter without rails  Entrance Stairs - Number of Steps: 2 ADIS     Bathroom Shower/Tub: Tub/Shower unit  Bathroom Toilet: Standard  Bathroom Accessibility: Accessible     ADL Assistance: Independent  Homemaking Assistance: Independent  Homemaking Responsibilities: Yes    Ambulation Assistance: Independent  Transfer Assistance: Independent    Active : Yes  Mode of Transportation: Car  Type of occupation:   Additional Comments: very active PTA    Objective  Overall Cognitive Status: WNL    Sensation  Overall Sensation Status: WNL        LUE AROM (degrees)  LUE AROM : WNL     RUE AROM (degrees)  RUE AROM : WNL     LUE Strength  Gross LUE Strength: WFL  L Hand Grasp: 4+/5  L Hand Release: 4+/5    RUE Strength  Gross RUE Strength: WFL  R Hand Grasp: 4+/5  R Hand Release: 4+/5    ADL  LE Dressing: Dependent/Total (To [facility] shorts. )  Toileting:  (Anticpating pt requiring max A for polly care after BM. )     Bed mobility  Sit to Supine: Contact guard assistance  Scooting: Contact guard assistance (To scoot up in bed while maintaing B LE NWB. )    Transfers  Slide Board: Contact guard assistance (W/c to EOB towards R side. )  Sit to stand: Unable to assess (BLE NWB. )  Stand to sit: Unable to assess    Balance  Sitting Balance: Stand by assistance  Standing Balance: Unable to assess(comment) (d/t BLE NWB. ) Functional Mobility  Functional - Mobility Device: Wheelchair  Activity: Other  Assist Level: Stand by assistance  Functional Mobility Comments: Pt completed w/c mobility in room to EOB, avoiding all obstacles. Activity Tolerance:  Activity Tolerance: Patient limited by fatigue, Patient limited by pain  Activity Tolerance: Pt reports 10/10 pain in L LE after in bed. RN Notified. Treatment Initiated:  OT Evaluation completed, see above for details. Pt completed B UE exs with Red theraband x 10 reps, x 1 set, while following a handout. Fair tolerance of exs, with 1 RBs throughout, and min cues for tech with theraband. Assessment:  Assessment: This 59year old demonstrates decreased ability to complete all self cares, transfers and mobility. Pt is NWB through B LEs. Pt requires skilled OT intervention to increase indep and safety with all self cares, transfers and w/c mobility to decrease fall risk and return to Indep PLOF. Pt is very motivated to return to Indep PLOF. Performance deficits / Impairments: Decreased functional mobility , Decreased ADL status, Decreased endurance, Decreased high-level IADLs  Prognosis: Good  Discharge Recommendations: Continue to assess pending progress, 24 hour supervision or assist, Patient would benefit from continued therapy after discharge (TCU)    Clinical Decision Making: Clinical Decision making was of Moderate Complexity as the result of analysis of data from a detailed assessment, a consideration of several treatment options, the presence of comorbidities affecting the plan of care and the need for minimal to moderate modifications or assistance required to complete the evaluation.     Patient Education:  Patient Education: OT Role, OT POC, Transfer safety, NWB through B LEs,   Barriers to Learning: None     Equipment Recommendations:  Equipment Needed: Yes  Mobility Devices: Wheelchair, ADL Assistive Devices, Transport Devices  ADL Assistive Devices:

## 2018-08-28 NOTE — PROGRESS NOTES
Physical Therapy   [address] 7K - 7K-10/010-A    Time In: 7639  Time Out: 3716  Timed Code Treatment Minutes: 43 Minutes  Minutes: 42          Date: 2018  Patient Name: Mark Mays,  Gender:  male        MRN: [id number]  : 1955  (59 y.o.)  Referral Date : 18  Referring Practitioner: PADMINI Bolivar MD  Diagnosis: fall from ladder, initial encounter  Additional Pertinent Hx: Alysha Warren is a 59year old male presenting to the Emergency Department via EMS for evaluation of potential injuries sustained in a fall from a ladder this evening. He reports that he was taking down a wreath when he fell from the ladder, landing directly onto his feet. He denies hitting his head or losing consciousness. Denies headache, visual changes, paresthesias, nausea or vomiting. Had immediate excruciating bilateral ankle pain that has subsided slightly after fentanyl administration in EMS. Presents to the ER with bilateral air splints from the EMS to his ankles/lower legs. Pulses are palpable. Wound noted to right posterior ankle/heel with no obvious bone protruding. Left medial ankle noted to have wound with distal end of tibia protruding.        Past Medical History:   Diagnosis Date    Embolism - blood clot     rt calf and groin    Hyperlipidemia      Past Surgical History:   Procedure Laterality Date    HIP SURGERY      lt    [place] OFFICE/OUTPT VISIT,PROCEDURE ONLY Right 2018    OPEN REDUCTION INTERNAL FIXATION OF PILON FRACTURE WITH EXTERNAL FIXATOR AND WOUND VAC PLACEMENT, RIGHT CALCANEOUS OPEN REDUCTION INTERNAL FIXATION performed by Chace Wilkes DPM at [place] [facility]       Restrictions/Precautions:  Weight Bearing, General Precautions, Fall Risk     Right Lower Extremity Weight Bearing: Non Weight Bearing     Left Lower Extremity Weight Bearing: Non Weight Bearing        Other position/activity restrictions: wound vac       Prior Level of

## 2018-08-28 NOTE — PROGRESS NOTES
[facility]  Daily Progress Note      Pt Name: Yaz Ellis  Medical Record Number: [id number]  Date of Birth 1955   Today's Date: 8/28/2018    HD: # 4    CC: \"Left hurts worse than right and I have a little bit of tingling in the left today. \"    ASSESSMENT  1. Active Hospital Problems    Diagnosis Date Noted    ELLEN (acute kidney injury) ([facility] [address] [N17.9] 08/25/2018    Anticoagulated on Coumadin [Z51.81, Z79.01] 08/24/2018    Displaced intraarticular fracture of right calcaneus, initial encounter for open fracture [S92.061B] 08/24/2018    Open left tibial fracture [S82.202B] 08/24/2018    Open fracture of distal end of left fibula [S82.832B] 08/24/2018    Accidental fall from ladder [F67. XXXA] 08/24/2018    Fall [Y94. XXXA]        PROCEDURES  08/25/18 - 1. ORIF Open Right Calcaneal Fracture  2. Layered Closure Right Skin laceration 5x1cm  3. Open Reduction Left Pilon Fracture with External fixator application  4. Application NPWT <76HY sq    PLAN  Admitted under trauma services to [address] [place] [address]     Right open calcaneus fracture & open left tibial and fibula fracture              - Podiatry managing   - s/p ORIF 8/25              - NV checks   - NWB to BLE   - awaiting plan for next surgery    - wound vac changes T, Th, Sat  Acute Blood Loss Anemia; post-procedural   -H&H monitoring   -Hgb this afternoon 7.6, stable, up from 7.4 this AM   -transfuse if <7  ID consulted per family request   - continue Ancef  General Diet  Up to chair, NWB BLE  Repeat labs in AM including INR  Heparin bridge to Coumadin   Prophylaxis: IS, C&DB, Pepcid, stool softeners  Pain control              - Fentanyl and Percocet PRN  PT/OT eval and treat  Discharge disposition pending clinical course   - home with home health vs TCU/ECF    - pending PT and OT evals and their recommendations     SUBJECTIVE  Shelia Flowers continues on 7K.  He remains stable from a trauma perspective this AM. Patient's

## 2018-08-28 NOTE — PROGRESS NOTES
for input(s): IONCA in the last 72 hours. Magnesium:No results for input(s): MG in the last 72 hours. Phosphorus:No results for input(s): PHOS in the last 72 hours. BNP:No results for input(s): BNP in the last 72 hours. Glucose:No results for input(s): POCGLU in the last 72 hours. HgbA1C: No results for input(s): LABA1C in the last 72 hours. INR:   Recent Labs      08/26/18   0405   INR  1.18*     Hepatic: No results for input(s): ALKPHOS, ALT, AST, PROT, BILITOT, BILIDIR, LABALBU in the last 72 hours. Amylase and Lipase:No results for input(s): LACTA, AMYLASE in the last 72 hours. Lactic Acid: No results for input(s): LACTA in the last 72 hours. Troponin: No results for input(s): CKTOTAL, CKMB, TROPONINT in the last 72 hours. BNP: No results for input(s): BNP in the last 72 hours. Lipids: No results for input(s): CHOL, TRIG, HDL, LDLCALC in the last 72 hours. Invalid input(s): LDL  ABGs: No results found for: PH, PCO2, PO2, HCO3, O2SAT    Radiology reports as per the Radiologist  Radiology: Xr Lumbar Spine (2-3 Views)    Result Date: 8/25/2018  PROCEDURE: XR LUMBAR SPINE (2-3 VIEWS) CLINICAL INFORMATION: Fall, bilateral open fractures to lower extremity,  . COMPARISON: No prior study. 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 No acute fracture or subluxation. Disc space narrowing at most levels posterior bulging disc can be seen at L3-4. Marginal osteophytes are present at every level. Pedicles are intact. No acute acute abnormality of the lumbar spine. Chronic degenerative changes are noted. **This report has been created using voice recognition software. It may contain minor errors which are inherent in voice recognition technology. ** Final report electronically signed by Dr. Jeronimo Bender on 8/25/2018 3:00 PM    Xr Tibia Fibula Left (2 Views)    Result Date: 8/24/2018  PROCEDURE: XR TIBIA FIBULA LEFT (2 VIEWS) CLINICAL INFORMATION: trauma, . COMPARISON: No prior study. TECHNIQUE: AP and lateral views of the left lower leg. FINDINGS: Bones/joints: There is a comminuted distal tibial metaphyseal fracture with lateral and anterior displacement of the distal fracture fragments and lateral and posterior angulation of the distal fracture fragments. There is intra-articular extension to the mortise. There is a fracture of the distal fibula with overlying the fracture fragments and posterior displacement and lateral and mild posterior angulation of the distal fracture fragment. There are screws in the distal tibial shaft consistent with old ORIF. There is mild widening of the lateral ankle mortise. There is a probable nondisplaced fibular neck fracture. Recommend follow-up. Soft tissues: There is soft tissue swelling about the fracture site with air in the soft tissues consistent with an open fracture. The end of the proximal tibial fracture fragment probably protrudes through the skin. Displaced and angulated comminuted distal tibial metaphyseal fracture with intra-articular extension. The fracture is open and the distal end of the proximal tibial shaft fracture fragment likely protrudes through the skin. Displaced and angulated distal fibular shaft fracture with overlying the fracture fragments. Probable fibular neck fracture. **This report has been created using voice recognition software. It may contain minor errors which are inherent in voice recognition technology. ** Final report electronically signed by Dr. Kaycee Ontiveros on 8/24/2018 10:53 PM    Xr Tibia Fibula Right (2 Views)    Result Date: 8/24/2018  PROCEDURE: XR TIBIA FIBULA RIGHT (2 VIEWS) CLINICAL INFORMATION: trauma, . COMPARISON: No prior study. TECHNIQUE: AP and lateral views of the right lower leg. FINDINGS: Bones/joints: No acute fracture or dislocation.  There is a well-defined incompletely imaged calcification at the anterior aspect of the lower patella which may represent soft tissue There is a comminuted distal tibial metaphyseal fracture with air and lateral displacement posterior and lateral angulation of the distal fracture fragment. There is intra-articular extension to the ankle joint. A fracture is also open with  air in the soft tissues and distal end of the proximal tibial fragment appears to protrude through the skin. Patient is status post old ORIF of the distal tibial shaft with 4 screws. There is a distal fibular shaft fracture with posterior displacement and lateral and mild posterior angulation of the distal fracture fragment. There is overriding of the fracture fragments. There is a plantar calcaneal spur. Soft tissues: There is soft tissue swelling of the lower leg and ankle. There is air in the soft tissues secondary to the open fracture. Comminuted, displaced, and angulated open fracture of the distal tibial metaphysis as detailed above. Displaced and angulated distal fibular fracture. **This report has been created using voice recognition software. It may contain minor errors which are inherent in voice recognition technology. ** Final report electronically signed by Dr. Milagro Harmon on 8/24/2018 10:56 PM    Xr Ankle Right (2 Views)    Addendum Date: 8/24/2018    ADDENDUM #1  The questioned intra-articular fracture of the distal calcaneus at the calcaneocuboid joint is artifactual when correlated with the accompanying right foot series. No fracture is seen on that study. Final report electronically signed by Dr. Milagro Harmon on 8/24/2018 11:01 PM  ORIGINAL REPORT PROCEDURE: XR ANKLE RIGHT (2 VIEWS) CLINICAL INFORMATION: Trauma, . COMPARISON: No prior study. TECHNIQUE: AP and lateral views of the right ankle. FINDINGS: Bones/joints: There is a comminuted posterior superior calcaneus fracture with displacement of the major fracture fragments posteriorly and superiorly.  There is also a lucency over the anterior aspect of the calcaneus at the calcaneocuboid joint which may represent a fracture versus artifact. No joint space narrowing or erosive changes. The ankle mortise is intact. Soft tissues: There is mild soft tissue swelling over the lateral malleolus. There is soft tissue swelling of the heel pad. Result Date: 8/24/2018  PROCEDURE: XR ANKLE RIGHT (2 VIEWS) CLINICAL INFORMATION: Trauma, . COMPARISON: No prior study. TECHNIQUE: AP and lateral views of the right ankle. FINDINGS: Bones/joints: There is a comminuted posterior superior calcaneus fracture with displacement of the major fracture fragments posteriorly and superiorly. There is also a lucency over the anterior aspect of the calcaneus at the calcaneocuboid joint which may represent a fracture versus artifact. No joint space narrowing or erosive changes. The ankle mortise is intact. Soft tissues: There is mild soft tissue swelling over the lateral malleolus. There is soft tissue swelling of the heel pad. Comminuted displaced posterior superior calcaneus fracture. Possible intra-articular fracture of the distal calcaneus at the calcaneocuboid joint versus artifact. **This report has been created using voice recognition software. It may contain minor errors which are inherent in voice recognition technology. ** Final report electronically signed by Dr. Evans Singleton on 8/24/2018 10:49 PM    Xr Foot Left (2 Views)    Result Date: 8/24/2018  PROCEDURE: XR FOOT LEFT (2 VIEWS) CLINICAL INFORMATION: trauma, . COMPARISON: No prior study. TECHNIQUE: 2 views of the left foot. FINDINGS: Bones/joints: There is a comminuted distal tibial metaphyseal fracture with anterior and lateral displacement of the major distal fracture fragment. No foot fracture or dislocation. There is a plantar calcaneal spur. There is no significant DJD. There is  a hammertoe of the second digit. Soft tissues: No significant soft tissue swelling. There are regional arterial atherosclerotic calcifications.      Comminuted distal tibial metaphyseal

## 2018-08-29 LAB
ANION GAP SERPL CALCULATED.3IONS-SCNC: 10 MEQ/L (ref 8–16)
APTT: 57.4 SECONDS (ref 22–38)
APTT: 81.3 SECONDS (ref 22–38)
BUN BLDV-MCNC: 11 MG/DL (ref 7–22)
CALCIUM SERPL-MCNC: 8.4 MG/DL (ref 8.5–10.5)
CHLORIDE BLD-SCNC: 97 MEQ/L (ref 98–111)
CO2: 26 MEQ/L (ref 23–33)
CREAT SERPL-MCNC: 1.1 MG/DL (ref 0.4–1.2)
ERYTHROCYTE [DISTWIDTH] IN BLOOD BY AUTOMATED COUNT: 12.5 % (ref 11.5–14.5)
ERYTHROCYTE [DISTWIDTH] IN BLOOD BY AUTOMATED COUNT: 43.2 FL (ref 35–45)
GFR SERPL CREATININE-BSD FRML MDRD: 68 ML/MIN/1.73M2
GLUCOSE BLD-MCNC: 110 MG/DL (ref 70–108)
HCT VFR BLD CALC: 23.7 % (ref 42–52)
HEMOGLOBIN: 7.8 GM/DL (ref 14–18)
MCH RBC QN AUTO: 31.1 PG (ref 26–33)
MCHC RBC AUTO-ENTMCNC: 32.9 GM/DL (ref 32.2–35.5)
MCV RBC AUTO: 94.4 FL (ref 80–94)
PLATELET # BLD: 205 THOU/MM3 (ref 130–400)
PMV BLD AUTO: 9.3 FL (ref 9.4–12.4)
POTASSIUM SERPL-SCNC: 4.3 MEQ/L (ref 3.5–5.2)
RBC # BLD: 2.51 MILL/MM3 (ref 4.7–6.1)
SODIUM BLD-SCNC: 133 MEQ/L (ref 135–145)
WBC # BLD: 6.3 THOU/MM3 (ref 4.8–10.8)

## 2018-08-29 PROCEDURE — 97530 THERAPEUTIC ACTIVITIES: CPT

## 2018-08-29 PROCEDURE — 2580000003 HC RX 258: Performed by: STUDENT IN AN ORGANIZED HEALTH CARE EDUCATION/TRAINING PROGRAM

## 2018-08-29 PROCEDURE — 85730 THROMBOPLASTIN TIME PARTIAL: CPT

## 2018-08-29 PROCEDURE — 99232 SBSQ HOSP IP/OBS MODERATE 35: CPT | Performed by: SURGERY

## 2018-08-29 PROCEDURE — 85027 COMPLETE CBC AUTOMATED: CPT

## 2018-08-29 PROCEDURE — 1200000003 HC TELEMETRY R&B

## 2018-08-29 PROCEDURE — 6370000000 HC RX 637 (ALT 250 FOR IP): Performed by: PHYSICIAN ASSISTANT

## 2018-08-29 PROCEDURE — 80048 BASIC METABOLIC PNL TOTAL CA: CPT

## 2018-08-29 PROCEDURE — 6370000000 HC RX 637 (ALT 250 FOR IP): Performed by: NURSE PRACTITIONER

## 2018-08-29 PROCEDURE — APPSS45 APP SPLIT SHARED TIME 31-45 MINUTES: Performed by: PHYSICIAN ASSISTANT

## 2018-08-29 PROCEDURE — 36415 COLL VENOUS BLD VENIPUNCTURE: CPT

## 2018-08-29 PROCEDURE — 99232 SBSQ HOSP IP/OBS MODERATE 35: CPT | Performed by: INTERNAL MEDICINE

## 2018-08-29 PROCEDURE — 97110 THERAPEUTIC EXERCISES: CPT

## 2018-08-29 PROCEDURE — 97542 WHEELCHAIR MNGMENT TRAINING: CPT

## 2018-08-29 PROCEDURE — 6370000000 HC RX 637 (ALT 250 FOR IP): Performed by: SURGERY

## 2018-08-29 PROCEDURE — 6360000002 HC RX W HCPCS: Performed by: STUDENT IN AN ORGANIZED HEALTH CARE EDUCATION/TRAINING PROGRAM

## 2018-08-29 RX ORDER — FAMOTIDINE 20 MG/1
20 TABLET, FILM COATED ORAL 2 TIMES DAILY
Status: CANCELLED | OUTPATIENT
Start: 2018-08-29

## 2018-08-29 RX ORDER — SODIUM CHLORIDE 0.9 % (FLUSH) 0.9 %
10 SYRINGE (ML) INJECTION EVERY 12 HOURS SCHEDULED
Status: CANCELLED | OUTPATIENT
Start: 2018-08-29

## 2018-08-29 RX ORDER — OXYCODONE HYDROCHLORIDE AND ACETAMINOPHEN 5; 325 MG/1; MG/1
1 TABLET ORAL EVERY 4 HOURS PRN
Status: CANCELLED | OUTPATIENT
Start: 2018-08-29

## 2018-08-29 RX ORDER — HEPARIN SODIUM 10000 [USP'U]/100ML
19 INJECTION, SOLUTION INTRAVENOUS CONTINUOUS
Status: CANCELLED | OUTPATIENT
Start: 2018-08-29

## 2018-08-29 RX ORDER — SODIUM CHLORIDE 0.9 % (FLUSH) 0.9 %
10 SYRINGE (ML) INJECTION PRN
Status: CANCELLED | OUTPATIENT
Start: 2018-08-29

## 2018-08-29 RX ORDER — SENNA PLUS 8.6 MG/1
4 TABLET ORAL NIGHTLY
Status: CANCELLED | OUTPATIENT
Start: 2018-08-29

## 2018-08-29 RX ORDER — SODIUM CHLORIDE 9 MG/ML
INJECTION, SOLUTION INTRAVENOUS CONTINUOUS
Status: CANCELLED | OUTPATIENT
Start: 2018-08-29

## 2018-08-29 RX ORDER — OXYCODONE HYDROCHLORIDE AND ACETAMINOPHEN 5; 325 MG/1; MG/1
2 TABLET ORAL EVERY 4 HOURS PRN
Status: CANCELLED | OUTPATIENT
Start: 2018-08-29

## 2018-08-29 RX ORDER — POLYETHYLENE GLYCOL 3350 17 G/17G
17 POWDER, FOR SOLUTION ORAL 2 TIMES DAILY
Status: CANCELLED | OUTPATIENT
Start: 2018-08-29

## 2018-08-29 RX ORDER — ACETAMINOPHEN 325 MG/1
650 TABLET ORAL EVERY 4 HOURS PRN
Status: CANCELLED | OUTPATIENT
Start: 2018-08-29

## 2018-08-29 RX ORDER — ATORVASTATIN CALCIUM 40 MG/1
40 TABLET, FILM COATED ORAL DAILY
Status: CANCELLED | OUTPATIENT
Start: 2018-08-30

## 2018-08-29 RX ORDER — CYCLOBENZAPRINE HCL 10 MG
10 TABLET ORAL 3 TIMES DAILY
Status: CANCELLED | OUTPATIENT
Start: 2018-08-29

## 2018-08-29 RX ORDER — DOCUSATE SODIUM 100 MG/1
100 CAPSULE, LIQUID FILLED ORAL 3 TIMES DAILY
Status: CANCELLED | OUTPATIENT
Start: 2018-08-29

## 2018-08-29 RX ADMIN — Medication 2 G: at 00:08

## 2018-08-29 RX ADMIN — CYCLOBENZAPRINE 10 MG: 10 TABLET, FILM COATED ORAL at 09:09

## 2018-08-29 RX ADMIN — FAMOTIDINE 20 MG: 20 TABLET ORAL at 20:55

## 2018-08-29 RX ADMIN — OXYCODONE HYDROCHLORIDE AND ACETAMINOPHEN 2 TABLET: 5; 325 TABLET ORAL at 09:09

## 2018-08-29 RX ADMIN — Medication 2 G: at 06:33

## 2018-08-29 RX ADMIN — POLYETHYLENE GLYCOL 3350 17 G: 17 POWDER, FOR SOLUTION ORAL at 20:55

## 2018-08-29 RX ADMIN — Medication 2 G: at 16:07

## 2018-08-29 RX ADMIN — ATORVASTATIN CALCIUM 40 MG: 40 TABLET, FILM COATED ORAL at 09:09

## 2018-08-29 RX ADMIN — CYCLOBENZAPRINE 10 MG: 10 TABLET, FILM COATED ORAL at 13:20

## 2018-08-29 RX ADMIN — NALOXEGOL OXALATE 12.5 MG: 12.5 TABLET, FILM COATED ORAL at 09:09

## 2018-08-29 RX ADMIN — HEPARIN SODIUM 17 UNITS/KG/HR: 10000 INJECTION, SOLUTION INTRAVENOUS at 09:21

## 2018-08-29 RX ADMIN — CYCLOBENZAPRINE 10 MG: 10 TABLET, FILM COATED ORAL at 20:55

## 2018-08-29 RX ADMIN — DOCUSATE SODIUM 100 MG: 100 CAPSULE, LIQUID FILLED ORAL at 13:20

## 2018-08-29 RX ADMIN — POLYETHYLENE GLYCOL 3350 17 G: 17 POWDER, FOR SOLUTION ORAL at 09:09

## 2018-08-29 RX ADMIN — OXYCODONE HYDROCHLORIDE AND ACETAMINOPHEN 2 TABLET: 5; 325 TABLET ORAL at 13:20

## 2018-08-29 RX ADMIN — DOCUSATE SODIUM 100 MG: 100 CAPSULE, LIQUID FILLED ORAL at 09:09

## 2018-08-29 RX ADMIN — FAMOTIDINE 20 MG: 20 TABLET ORAL at 09:09

## 2018-08-29 RX ADMIN — DOCUSATE SODIUM 100 MG: 100 CAPSULE, LIQUID FILLED ORAL at 20:55

## 2018-08-29 RX ADMIN — SENNOSIDES 34.4 MG: 8.6 TABLET, FILM COATED ORAL at 20:55

## 2018-08-29 RX ADMIN — OXYCODONE HYDROCHLORIDE AND ACETAMINOPHEN 2 TABLET: 5; 325 TABLET ORAL at 22:11

## 2018-08-29 RX ADMIN — OXYCODONE HYDROCHLORIDE AND ACETAMINOPHEN 2 TABLET: 5; 325 TABLET ORAL at 04:21

## 2018-08-29 RX ADMIN — OXYCODONE HYDROCHLORIDE AND ACETAMINOPHEN 2 TABLET: 5; 325 TABLET ORAL at 18:00

## 2018-08-29 ASSESSMENT — PAIN DESCRIPTION - ONSET
ONSET: ON-GOING

## 2018-08-29 ASSESSMENT — PAIN DESCRIPTION - DESCRIPTORS
DESCRIPTORS: ACHING
DESCRIPTORS: ACHING;CONSTANT
DESCRIPTORS: ACHING;CONSTANT
DESCRIPTORS: ACHING
DESCRIPTORS: ACHING;CONSTANT

## 2018-08-29 ASSESSMENT — PAIN DESCRIPTION - PAIN TYPE
TYPE: ACUTE PAIN;SURGICAL PAIN
TYPE: ACUTE PAIN;SURGICAL PAIN
TYPE: SURGICAL PAIN
TYPE: ACUTE PAIN;SURGICAL PAIN
TYPE: ACUTE PAIN;SURGICAL PAIN
TYPE: SURGICAL PAIN
TYPE: ACUTE PAIN;SURGICAL PAIN

## 2018-08-29 ASSESSMENT — PAIN DESCRIPTION - PROGRESSION
CLINICAL_PROGRESSION: GRADUALLY IMPROVING
CLINICAL_PROGRESSION: GRADUALLY WORSENING
CLINICAL_PROGRESSION: NOT CHANGED
CLINICAL_PROGRESSION: GRADUALLY IMPROVING
CLINICAL_PROGRESSION: GRADUALLY IMPROVING

## 2018-08-29 ASSESSMENT — PAIN SCALES - GENERAL
PAINLEVEL_OUTOF10: 7
PAINLEVEL_OUTOF10: 4
PAINLEVEL_OUTOF10: 4
PAINLEVEL_OUTOF10: 7
PAINLEVEL_OUTOF10: 6
PAINLEVEL_OUTOF10: 5
PAINLEVEL_OUTOF10: 5
PAINLEVEL_OUTOF10: 7
PAINLEVEL_OUTOF10: 4
PAINLEVEL_OUTOF10: 5
PAINLEVEL_OUTOF10: 7
PAINLEVEL_OUTOF10: 4
PAINLEVEL_OUTOF10: 3
PAINLEVEL_OUTOF10: 7

## 2018-08-29 ASSESSMENT — PAIN DESCRIPTION - LOCATION
LOCATION: ANKLE

## 2018-08-29 ASSESSMENT — PAIN DESCRIPTION - FREQUENCY
FREQUENCY: CONTINUOUS
FREQUENCY: INTERMITTENT
FREQUENCY: CONTINUOUS
FREQUENCY: CONTINUOUS
FREQUENCY: INTERMITTENT

## 2018-08-29 ASSESSMENT — PAIN DESCRIPTION - ORIENTATION
ORIENTATION: RIGHT;LEFT
ORIENTATION: RIGHT;LEFT
ORIENTATION: LEFT;RIGHT
ORIENTATION: RIGHT;LEFT

## 2018-08-29 NOTE — PROGRESS NOTES
[address] [address]  Transitional Care Unit Preadmission Assessment    Patient Name: Lori Garnica        MRN: [id number]    Acct: [de-identified]    : 1955  (65 y.o.)  Gender: male     Admitted From:  [x][facility] []Outside Admission - Location:  Date of Admission to the hospital:2018  Date patient eligible for admission:2018  Primary Diagnosis Acute blood loss as cause of postoperative anemia    Did patient have surgery? [x] Yes- Explain:  2018 OPEN REDUCTION INTERNAL FIXATION OF PILON FRACTURE WITH EXTERNAL FIXATOR AND WOUND VAC PLACEMENT, RIGHT CALCANEOUS OPEN REDUCTION INTERNAL FIXATION      Physicians:Dr Michelle Barlow, Dr Jonnathan Slater, Dr Linn Segovia for clinical complications/co-morbidities:   Past Medical History:   Diagnosis Date    Embolism - blood clot     rt calf and groin    Hyperlipidemia        Financial Information  Primary insurance:  []Medicare [] Medicare HMO  [x]Commercial insurance    []Medicaid   []Workers Compensation      Secondary Insurance:  []Medicare [] Medicare HMO  []Commercial insurance    []Medicaid   []Workers Compensation [x]None    Precautions:   []Cardiac Precautions  []Total hip precautions    [x]Weight Bearing status: NWB bilateral lower extremities  [x]Safety Precautions/Concerns  []Visually impaired   []Hard of Hearing     Isolation Precautions:         []Yes  [x]No  If Yes:  [] Droplet  []Contact []Airborne                   []VRE          []MRSA               []C-diff         [] TB  [] Other:       Skills:   [x]Physical therapy     [x]Occupational Therapy   [x]IV Antibiotics   [] IV meds   [] TPN     []PEG tube Feedings      []New Colostomy   [] Ileostomy care/teaching    [] Speech Therapy   []Wound Vac   []Complex Dressing Changes    []Terminal care    []Other       Has patient had Prior Skilled care in the Last 60 days:  []Yes  [x]NO   If Yes:   Skilled Facility:    How many skilled days were used?

## 2018-08-29 NOTE — PROGRESS NOTES
views of the left ankle. FINDINGS: Bones/joints: There is a comminuted distal tibial metaphyseal fracture with air and lateral displacement posterior and lateral angulation of the distal fracture fragment. There is intra-articular extension to the ankle joint. A fracture is also open with  air in the soft tissues and distal end of the proximal tibial fragment appears to protrude through the skin. Patient is status post old ORIF of the distal tibial shaft with 4 screws. There is a distal fibular shaft fracture with posterior displacement and lateral and mild posterior angulation of the distal fracture fragment. There is overriding of the fracture fragments. There is a plantar calcaneal spur. Soft tissues: There is soft tissue swelling of the lower leg and ankle. There is air in the soft tissues secondary to the open fracture. Comminuted, displaced, and angulated open fracture of the distal tibial metaphysis as detailed above. Displaced and angulated distal fibular fracture. **This report has been created using voice recognition software. It may contain minor errors which are inherent in voice recognition technology. ** Final report electronically signed by Dr. Anamaria Washington on 8/24/2018 10:56 PM    Xr Ankle Right (2 Views)    Addendum Date: 8/24/2018    ADDENDUM #1  The questioned intra-articular fracture of the distal calcaneus at the calcaneocuboid joint is artifactual when correlated with the accompanying right foot series. No fracture is seen on that study. Final report electronically signed by Dr. Anamaria Washington on 8/24/2018 11:01 PM  ORIGINAL REPORT PROCEDURE: XR ANKLE RIGHT (2 VIEWS) CLINICAL INFORMATION: Trauma, . COMPARISON: No prior study. TECHNIQUE: AP and lateral views of the right ankle. FINDINGS: Bones/joints: There is a comminuted posterior superior calcaneus fracture with displacement of the major fracture fragments posteriorly and superiorly.  There is also a lucency over the anterior aspect of calcifications. Comminuted distal tibial metaphyseal fracture. No foot fracture or dislocation. **This report has been created using voice recognition software. It may contain minor errors which are inherent in voice recognition technology. ** Final report electronically signed by Dr. Akila Bowling on 8/24/2018 10:48 PM    Xr Foot Right (2 Views)    Result Date: 8/24/2018  PROCEDURE: XR FOOT RIGHT (2 VIEWS) CLINICAL INFORMATION: trauma, . COMPARISON: No prior study. TECHNIQUE: PA and lateral views of the right foot. FINDINGS: Bones/joints: There is a comminuted displaced fracture of the posterior superior calcaneus with posterior superior displacement of the fracture fragments. The fracture extends to the subtalar joint posteriorly. There is a moderate hallux valgus with mild  first MTP joint DJD. Soft tissues: There is soft tissue swelling of the heel pad. There is questionable air in the soft tissues superior to the calcaneal fracture fragment suggesting an open fracture. Comminuted displaced posterior calcaneus fracture. Possible air in the soft tissues suspicious for an open fracture. **This report has been created using voice recognition software. It may contain minor errors which are inherent in voice recognition technology. ** Final report electronically signed by Dr. Akila Bowling on 8/24/2018 11:00 PM    Xr Calcaneus Right (min 2 Views)    Result Date: 8/26/2018  PROCEDURE: XR CALCANEUS RIGHT (MIN 2 VIEWS) CLINICAL INFORMATION: fx, . COMPARISON: Right foot series dated 8/24/2018 TECHNIQUE: C-arm lateral view of the calcaneus was obtained. FINDINGS: There has been interval ORIF of the previously noted comminuted displaced posterior superior calcaneus fracture. 3 screws maintain what appears to be anatomic alignment and position of the fracture fragments. Fracture again extends to the posterior subtalar joint.  Soft tissue defects are seen posterior to the calcaneus and in the posterior heel pad soft anxious  HEENT: Normocephalic, Atraumatic, Conjuctiva pink, PERRL, Oral mucosa normal, Lips, teeth and gums normal, Trachea midline, Thyroid normal and No noted lymphadenopathy  Chest - clear to auscultation, no wheezes, rales or rhonchi, symmetric air entry  Cardiovascular - normal rate, regular rhythm, normal S1, S2, no murmurs, rubs, clicks or gallops  Abdomen - soft, nontender, nondistended, no masses or organomegaly   Neurological - Alert and oriented, Normal speech, No focal findings or movement disorder noted and Motor and sensory grossly normal bilaterally  Integumentary - Skin color, texture, turgor normal. No Rashes or lesions  Musculoskeletal -bilateral lower extremities dressed with external fixators      DVT prophylaxis: [x] Lovenox                                 [] SCDs                                 [] SQ Heparin                                 [] Encourage ambulation           [] Already on Anticoagulation                 ASSESSMENT / PLAN :    Principal Problem:    Acute blood loss as cause of postoperative anemia  H&H stable and asymptomatic, May need transfusion if revision surgery is planned. Will leave that to the surgeon's discretion. Active Problems:    H/O deep venous thrombosis, Anticoagulated on Coumadin  Currently on heparin gtt, to switch to coumadin when all operative interventions are complete      Displaced intraarticular fracture of right calcaneus, initial encounter for open fracture  Management as per orthopedic consult      Open left tibial fracture / Open fracture of distal end of left fibula  Management as per orthopedic consult recommendations      Accidental fall from ladder  As indicated above      ELLEN (acute kidney injury) ([facility] [address]  Resolved     Resolved Problems:    * No resolved hospital problems. *    Patient is being discharged and transferred to the [address] [place] [address]. Hospitalist will therefore sign off at this time and please call back with question.  Appreciate your consultation

## 2018-08-29 NOTE — PROGRESS NOTES
Assistance: Independent  Homemaking Assistance: Independent  Ambulation Assistance: Independent  Transfer Assistance: Independent  Additional Comments: very active PTA    Subjective:     Subjective: RN approved therapy session. Pt. Geraldine Day in bed upon arrival and pleasantly agrees to therapy session. Pt. very pleasant and motivated. Pt. requests to sit on BS commode and then return to bed at end of session. Pain:  Yes. Pain Assessment  Pain Assessment: 0-10  Pain Level: 5  Pain Type: Acute pain;Surgical pain  Pain Location: Ankle  Pain Orientation: Right;Left       Social/Functional:  Lives With: Spouse  Type of Home: House  Home Layout: Two level, Able to Live on Main level with bedroom/bathroom  Home Access: Stairs to enter without rails  Entrance Stairs - Number of Steps: 2 ADIS     Objective:  Supine to Sit: Stand by assistance  Sit to Supine: Stand by assistance  Scooting: Stand by assistance    Transfers  Lateral Transfers: Contact guard assistance (Pt. able to place and remove slide board without assist this date. Pt. performed 4x total during session. Cues required for hand placement. )         Wheelchair Activities  Wheelchair Parts Management: Yes  All Wheelchair Parts Management: Instructions required for managing leg rests and armrests. Pt. manages brakes well. Propulsion: Yes  Propulsion 1  Propulsion: Manual  Level: Level Tile  Method: LUE;RUE  Level of Assistance: Supervision  Description/ Details: Good propulsion speed and manueverability. Pt. fatigued at end of propulsion. Distance: 250' x 1         Exercises:  Exercises  Comments: Pt. completed B LE ther ex 10-15x each consisting of toe curls, long arc quads, glute/quad sets, hip abd/add, and marches all to increase strength and endurance to enhance functional mobility. Activity Tolerance:  Activity Tolerance: Patient Tolerated treatment well;Patient limited by fatigue    Assessment:   Body structures, Functions, Activity limitations: Decreased functional mobility , Decreased ROM, Decreased ADL status, Decreased strength, Decreased safe awareness, Decreased endurance, Decreased balance  Assessment: Pt. tolerated session well. Pt. very pleasant and motivated. Pt. demos good manueverability with WC and manages slide board well. Cues required at times for safety. Pt. would benefit from continued skilled PT to increase strength and endurance to enhance functional mobility. Prognosis: Excellent, Good     REQUIRES PT FOLLOW UP: Yes  Discharge Recommendations: Continue to assess pending progress, Patient would benefit from continued therapy after discharge    Patient Education:  Patient Education: Transfers, WC management, POC, Ther ex    Equipment Recommendations:  Equipment Needed: Yes  Other: will likely need w/c and slideboard    Safety:  Type of devices:  All fall risk precautions in place, Call light within reach, Patient at risk for falls, Left in bed, Nurse notified    Plan:  Times per week: 7x T  Times per day: Daily  Specific instructions for Next Treatment: ther ex, ther act, transfers, w/c mobility  Current Treatment Recommendations: Strengthening, Balance Training, Functional Mobility Training, Home Exercise Program, Safety Education & Training, Patient/Caregiver Education & Training, Equipment Evaluation, Education, & procurement, Endurance Training, Transfer Training, Wheelchair Mobility Training    Goals:  Patient goals : gain independence    Short term goals  Time Frame for Short term goals: 2 weeks  Short term goal 1: patient to perform sit to and from supine at S with maintenance of NWB status to get in and out of bed  Short term goal 2: patient to tolerate 20 minutes at EOB at S for increased tolerance to activity  Short term goal 3: patient able to perform lateral transfers along EOB at S for prep for transfer to chair  Short term goal 4: Pt to be Supervision for slideboard transfer bed <> wheelchair to get in/out of bed to

## 2018-08-29 NOTE — CARE COORDINATION
8/29/18, 9:26 AM    Discharge plan discussed by  and . Discharge plan reviewed with patient/ family. Patient/ family verbalize understanding of discharge plan and are in agreement with plan. Understanding was demonstrated using the teach back method.    Services After Discharge  Services At/After Discharge: OT, PT, Nursing Services, Skilled Therapy         Planning [address] [place] [address]

## 2018-08-29 NOTE — PROGRESS NOTES
Laurent approved. Planning TCU today if all physicians are in agreement. [address] notified.

## 2018-08-29 NOTE — FLOWSHEET NOTE
[facility]  OCCUPATIONAL THERAPY MISSED TREATMENT NOTE  [facility] 7K  7K-10/010-A      Date: 2018  Patient Name: Syed Casillas        CSN: [id number]   : 1955  (59 y.o.)  Gender: male   Referring Practitioner: Howard Cerna DPM  Diagnosis: Fall from Ladder, initial encounter         REASON FOR MISSED TREATMENT:  Missed Treat. RN okayed OT session. Upon arrival patient was sleeping in bed. RN requesting to allows patient to sleep d/t not sleeping well last night.

## 2018-08-29 NOTE — PROGRESS NOTES
Heparin Consult  Lab Results   Component Value Date    APTT 57.4 08/29/2018     Lab Results   Component Value Date    HGB 7.8 08/29/2018    HCT 23.7 08/29/2018     08/29/2018    INR 1.18 08/26/2018       Current Rate: 17 units/kg/hr    Plan:  Rate: Increase heparin drip rate to 19 units/kg/hr  Next aPTT: 1645 (already scheduled as q12h)

## 2018-08-29 NOTE — PROGRESS NOTES
clinical course   - TCU precert approved, planned TCU admission today pending sign-off per providers      Cesar Ash continues on [address]. He remains stable from a trauma perspective this AM. Patient's hemoglobin is 7.8 this AM, up from 7.6 yesterday afternoon. We will continue to trend. Heparin drip rate increased per Pharmacy. Pt was up with OT in wheelchair just prior to evaluation and reports activity tolerance. Mildly increased pain, rated as 6/10 with activity, 4-5/10 at rest. He admits adequate analgesia at this time but does endorse bilateral LE tingling sensations. NV intact on exam. Bilateral lower extremities are wrapped in Ace bandages with hemovac present on LLE. Serosanguineous drainage noted in atrium, with minimal amount noted in tubing. Podiatry managing. Patient continues on Ancef antibiotics at this time. He is tolerating a general diet this morning, and reports BMs last night and this am following addition of Movantik and prune juice. Denies any nausea, vomiting, or abdominal discomfort at this time. He further denies any chest pain or shortness of breath. TCU precert approved per .  Pt can be readmitted to [facility] pending provider approval.     Wt Readings from Last 3 Encounters:   08/25/18 175 lb (79.4 kg)   11/26/12 165 lb (74.8 kg)     Temp Readings from Last 3 Encounters:   08/29/18 98 °F (36.7 °C) (Oral)   11/26/12 98 °F (36.7 °C) (Oral)     BP Readings from Last 3 Encounters:   08/29/18 110/66   08/25/18 (!) 103/58   11/26/12 (!) 147/100     Pulse Readings from Last 3 Encounters:   08/29/18 83   11/26/12 63       24 HR INTAKE/OUTPUT :     Intake/Output Summary (Last 24 hours) at 08/29/18 1054  Last data filed at 08/29/18 1021   Gross per 24 hour   Intake             2031 ml   Output             1500 ml   Net              531 ml   BM = 0      DIET GENERAL;    OBJECTIVE  CURRENT VITALS /71   Pulse 98   Temp 98.4 °F (36.9 °C) (Oral)   Resp 16   Ht 5' 4\" (1.626 m)   Wt 175 stable. No signs of active bleeding. Nonweightbearing bilateral lower extremities. Possible surgical intervention beginning of the week with podiatry. PT/OT. Regular diet. Bowel function returning. Discharge planning in process. Possible TCU admission soon. Planning heparin to Coumadin. Continue supportive care.     Electronically signed by Veronica Pressley MD on 8/29/2018 at 2:24 PM

## 2018-08-29 NOTE — PLAN OF CARE
Problem: Pain:  Goal: Pain level will decrease  Pain level will decrease    Outcome: Ongoing      Problem: Neurological  Goal: Maximum potential motor/sensory/cognitive function  Outcome: Ongoing  Patient awake, alert and oriented x3. Patient hand grasp moderate and pedal push/pull MARLINE due to splint. Patient non-wt bearing on bilateral lower extremities. PT/OT on. Patient 1-2 assist with slider board, tolerates fairly well. Will continue to monitor and assess. Problem: Cardiovascular  Goal: No DVT, peripheral vascular complications  Outcome: Ongoing  Patient denies SOB or pain in lower extremities. Patient afebrile. Vitals stable. Patient currently on Heparin drip due to Hx of blood clots. No redness, warmth or tenderness in lower extremities noted. Will continue to monitor and assess. Problem: GI  Goal: No bowel complications  Outcome: Ongoing  Patient has had 2 BM today. Bowel sounds active. Patient states he is passing gas at this time. Patient denies pain or pressure in abdomen. Will continue to monitor and assess. Problem:   Goal: Adequate urinary output  Outcome: Ongoing  Patient voids per commode/urinal. Clear, yellow urine. Patient urinary output adequate at this time. Patient encouraged to eat and drink regularly. Patient verbalizes understanding. Will continue to monitor and assess. Problem: Skin Integrity/Risk  Goal: No skin breakdown during hospitalization  Outcome: Ongoing  Patient skin assessed throughout shift. See physical assessment documentation for further details. Patient bilateral leg dressings clean, dry and intact. Patient turns self/with assist at times. Patient educated to turn and reposition to prevent skin breakdown and sores. Patient verbalizes understanding. Will continue to monitor and assess.       Problem: Musculor/Skeletal Functional Status  Goal: Highest potential functional level  Outcome: Ongoing  Patient hand grasp moderate and pedal push/pull MARLINE due to splint. Patient non-wt bearing on bilateral lower extremities. PT/OT on. Patient 1-2 assist with slider board, tolerates fairly well. Bed/chair alarm on. Will continue to monitor and assess. Goal: Absence of falls  Outcome: Ongoing  Patient fall risk sign and band in place. Patient educated to use call light when needing to ambulate or use the bathroom. Patient verbalizes understanding. Patient hand grasp moderate and pedal push/pull MARLINE due to splint. Patient non-wt bearing on bilateral lower extremities. PT/OT on. Patient 1-2 assist with slider board, tolerates fairly well. Bed/chair alarm on. Call light within reach. Will continue to monitor and assess. Problem: Discharge Planning:  Goal: Discharged to appropriate level of care  Discharged to appropriate level of care   Outcome: Ongoing  Discharge planning in progress. Patient will go to TCU upon discharge. Potential discharge date 8/30/18. Will continue to follow. Problem: DISCHARGE BARRIERS  Goal: Patient's continuum of care needs are met  Outcome: Ongoing  Discharge planning in progress. Patient will go to TCU upon discharge. Potential discharge date 8/30/18. Will continue to follow. Comments: Plan of care discussed with patient and family. Patient and family verbalize understanding of plan of care and contribute to goal setting.

## 2018-08-30 ENCOUNTER — HOSPITAL ENCOUNTER (INPATIENT)
Age: 63
LOS: 5 days | Discharge: ANOTHER HOSPITAL | DRG: 560 | End: 2018-09-04
Attending: FAMILY MEDICINE | Admitting: FAMILY MEDICINE
Payer: COMMERCIAL

## 2018-08-30 VITALS
SYSTOLIC BLOOD PRESSURE: 139 MMHG | HEIGHT: 64 IN | DIASTOLIC BLOOD PRESSURE: 71 MMHG | WEIGHT: 175 LBS | HEART RATE: 96 BPM | OXYGEN SATURATION: 94 % | RESPIRATION RATE: 16 BRPM | TEMPERATURE: 98 F | BODY MASS INDEX: 29.88 KG/M2
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
## 2018-08-30 LAB
APTT: 59.2 SECONDS (ref 22–38)
APTT: 61.5 SECONDS (ref 22–38)

## 2018-08-30 PROCEDURE — 6370000000 HC RX 637 (ALT 250 FOR IP): Performed by: NURSE PRACTITIONER

## 2018-08-30 PROCEDURE — 6370000000 HC RX 637 (ALT 250 FOR IP): Performed by: SURGERY

## 2018-08-30 PROCEDURE — 6370000000 HC RX 637 (ALT 250 FOR IP): Performed by: INTERNAL MEDICINE

## 2018-08-30 PROCEDURE — 2580000003 HC RX 258: Performed by: STUDENT IN AN ORGANIZED HEALTH CARE EDUCATION/TRAINING PROGRAM

## 2018-08-30 PROCEDURE — 1290000000 HC SEMI PRIVATE OTHER R&B

## 2018-08-30 PROCEDURE — 99231 SBSQ HOSP IP/OBS SF/LOW 25: CPT | Performed by: SURGERY

## 2018-08-30 PROCEDURE — 85730 THROMBOPLASTIN TIME PARTIAL: CPT

## 2018-08-30 PROCEDURE — 6370000000 HC RX 637 (ALT 250 FOR IP): Performed by: FAMILY MEDICINE

## 2018-08-30 PROCEDURE — 6360000002 HC RX W HCPCS: Performed by: SURGERY

## 2018-08-30 PROCEDURE — APPSS60 APP SPLIT SHARED TIME 46-60 MINUTES: Performed by: NURSE PRACTITIONER

## 2018-08-30 PROCEDURE — 97110 THERAPEUTIC EXERCISES: CPT

## 2018-08-30 PROCEDURE — 97530 THERAPEUTIC ACTIVITIES: CPT

## 2018-08-30 PROCEDURE — 6360000002 HC RX W HCPCS: Performed by: STUDENT IN AN ORGANIZED HEALTH CARE EDUCATION/TRAINING PROGRAM

## 2018-08-30 PROCEDURE — 6370000000 HC RX 637 (ALT 250 FOR IP): Performed by: STUDENT IN AN ORGANIZED HEALTH CARE EDUCATION/TRAINING PROGRAM

## 2018-08-30 PROCEDURE — 6370000000 HC RX 637 (ALT 250 FOR IP): Performed by: PHYSICIAN ASSISTANT

## 2018-08-30 PROCEDURE — 6360000002 HC RX W HCPCS: Performed by: PHARMACIST

## 2018-08-30 PROCEDURE — 97542 WHEELCHAIR MNGMENT TRAINING: CPT

## 2018-08-30 PROCEDURE — 36415 COLL VENOUS BLD VENIPUNCTURE: CPT

## 2018-08-30 RX ORDER — TIZANIDINE 4 MG/1
4 TABLET ORAL EVERY 6 HOURS PRN
Status: DISCONTINUED | OUTPATIENT
Start: 2018-08-30 | End: 2018-09-04 | Stop reason: HOSPADM

## 2018-08-30 RX ORDER — DOCUSATE SODIUM 100 MG/1
100 CAPSULE, LIQUID FILLED ORAL 3 TIMES DAILY
Status: DISCONTINUED | OUTPATIENT
Start: 2018-08-30 | End: 2018-09-04 | Stop reason: HOSPADM

## 2018-08-30 RX ORDER — ATORVASTATIN CALCIUM 40 MG/1
40 TABLET, FILM COATED ORAL DAILY
Status: DISCONTINUED | OUTPATIENT
Start: 2018-08-31 | End: 2018-09-04 | Stop reason: HOSPADM

## 2018-08-30 RX ORDER — OXYCODONE HYDROCHLORIDE AND ACETAMINOPHEN 5; 325 MG/1; MG/1
1 TABLET ORAL EVERY 4 HOURS PRN
Status: DISCONTINUED | OUTPATIENT
Start: 2018-08-30 | End: 2018-08-31

## 2018-08-30 RX ORDER — SODIUM CHLORIDE 0.9 % (FLUSH) 0.9 %
10 SYRINGE (ML) INJECTION EVERY 12 HOURS SCHEDULED
Status: DISCONTINUED | OUTPATIENT
Start: 2018-08-30 | End: 2018-09-04 | Stop reason: HOSPADM

## 2018-08-30 RX ORDER — HEPARIN SODIUM 10000 [USP'U]/100ML
21 INJECTION, SOLUTION INTRAVENOUS CONTINUOUS
Status: DISCONTINUED | OUTPATIENT
Start: 2018-08-30 | End: 2018-08-31

## 2018-08-30 RX ORDER — SENNA PLUS 8.6 MG/1
4 TABLET ORAL NIGHTLY
Status: DISCONTINUED | OUTPATIENT
Start: 2018-08-30 | End: 2018-09-04 | Stop reason: HOSPADM

## 2018-08-30 RX ORDER — OXYCODONE HYDROCHLORIDE AND ACETAMINOPHEN 5; 325 MG/1; MG/1
2 TABLET ORAL EVERY 4 HOURS PRN
Status: DISCONTINUED | OUTPATIENT
Start: 2018-08-30 | End: 2018-08-31

## 2018-08-30 RX ORDER — ACETAMINOPHEN 325 MG/1
650 TABLET ORAL EVERY 4 HOURS PRN
Status: DISCONTINUED | OUTPATIENT
Start: 2018-08-30 | End: 2018-09-04 | Stop reason: HOSPADM

## 2018-08-30 RX ORDER — POLYETHYLENE GLYCOL 3350 17 G/17G
17 POWDER, FOR SOLUTION ORAL 2 TIMES DAILY
Status: DISCONTINUED | OUTPATIENT
Start: 2018-08-30 | End: 2018-09-04 | Stop reason: HOSPADM

## 2018-08-30 RX ORDER — SODIUM CHLORIDE 0.9 % (FLUSH) 0.9 %
10 SYRINGE (ML) INJECTION PRN
Status: DISCONTINUED | OUTPATIENT
Start: 2018-08-30 | End: 2018-09-04 | Stop reason: HOSPADM

## 2018-08-30 RX ORDER — TIZANIDINE 4 MG/1
4 TABLET ORAL EVERY 6 HOURS PRN
Status: DISCONTINUED | OUTPATIENT
Start: 2018-08-30 | End: 2018-08-30 | Stop reason: HOSPADM

## 2018-08-30 RX ORDER — CEPHALEXIN 500 MG/1
500 CAPSULE ORAL EVERY 8 HOURS SCHEDULED
Status: DISCONTINUED | OUTPATIENT
Start: 2018-08-30 | End: 2018-09-04 | Stop reason: HOSPADM

## 2018-08-30 RX ORDER — HEPARIN SODIUM 10000 [USP'U]/100ML
19 INJECTION, SOLUTION INTRAVENOUS CONTINUOUS
Status: DISCONTINUED | OUTPATIENT
Start: 2018-08-30 | End: 2018-08-30

## 2018-08-30 RX ORDER — CEPHALEXIN 500 MG/1
500 CAPSULE ORAL EVERY 8 HOURS SCHEDULED
Status: DISCONTINUED | OUTPATIENT
Start: 2018-08-30 | End: 2018-08-30 | Stop reason: HOSPADM

## 2018-08-30 RX ORDER — SODIUM CHLORIDE 9 MG/ML
INJECTION, SOLUTION INTRAVENOUS CONTINUOUS
Status: DISCONTINUED | OUTPATIENT
Start: 2018-08-30 | End: 2018-09-04 | Stop reason: HOSPADM

## 2018-08-30 RX ORDER — FAMOTIDINE 20 MG/1
20 TABLET, FILM COATED ORAL 2 TIMES DAILY
Status: DISCONTINUED | OUTPATIENT
Start: 2018-08-30 | End: 2018-09-04 | Stop reason: HOSPADM

## 2018-08-30 RX ADMIN — ATORVASTATIN CALCIUM 40 MG: 40 TABLET, FILM COATED ORAL at 08:43

## 2018-08-30 RX ADMIN — HEPARIN SODIUM AND DEXTROSE 19 UNITS/KG/HR: 10000; 5 INJECTION INTRAVENOUS at 18:14

## 2018-08-30 RX ADMIN — TIZANIDINE 4 MG: 4 TABLET ORAL at 12:50

## 2018-08-30 RX ADMIN — OXYCODONE HYDROCHLORIDE AND ACETAMINOPHEN 1 TABLET: 5; 325 TABLET ORAL at 22:47

## 2018-08-30 RX ADMIN — NALOXEGOL OXALATE 12.5 MG: 12.5 TABLET, FILM COATED ORAL at 08:43

## 2018-08-30 RX ADMIN — POLYETHYLENE GLYCOL 3350 17 G: 17 POWDER, FOR SOLUTION ORAL at 08:42

## 2018-08-30 RX ADMIN — FAMOTIDINE 20 MG: 20 TABLET ORAL at 08:42

## 2018-08-30 RX ADMIN — Medication 2 G: at 08:43

## 2018-08-30 RX ADMIN — HEPARIN SODIUM AND DEXTROSE 21 UNITS/KG/HR: 10000; 5 INJECTION INTRAVENOUS at 19:27

## 2018-08-30 RX ADMIN — SENNOSIDES 34.4 MG: 8.6 TABLET, FILM COATED ORAL at 20:31

## 2018-08-30 RX ADMIN — DOCUSATE SODIUM 100 MG: 100 CAPSULE, LIQUID FILLED ORAL at 08:43

## 2018-08-30 RX ADMIN — OXYCODONE HYDROCHLORIDE AND ACETAMINOPHEN 1 TABLET: 5; 325 TABLET ORAL at 15:29

## 2018-08-30 RX ADMIN — OXYCODONE HYDROCHLORIDE AND ACETAMINOPHEN 2 TABLET: 5; 325 TABLET ORAL at 06:10

## 2018-08-30 RX ADMIN — OXYCODONE HYDROCHLORIDE AND ACETAMINOPHEN 2 TABLET: 5; 325 TABLET ORAL at 02:21

## 2018-08-30 RX ADMIN — CEPHALEXIN 500 MG: 500 CAPSULE ORAL at 22:46

## 2018-08-30 RX ADMIN — OXYCODONE HYDROCHLORIDE AND ACETAMINOPHEN 1 TABLET: 5; 325 TABLET ORAL at 11:33

## 2018-08-30 RX ADMIN — OXYCODONE HYDROCHLORIDE AND ACETAMINOPHEN 1 TABLET: 5; 325 TABLET ORAL at 20:31

## 2018-08-30 RX ADMIN — CYCLOBENZAPRINE 10 MG: 10 TABLET, FILM COATED ORAL at 06:11

## 2018-08-30 RX ADMIN — DOCUSATE SODIUM 100 MG: 100 CAPSULE, LIQUID FILLED ORAL at 20:31

## 2018-08-30 RX ADMIN — Medication 2 G: at 00:23

## 2018-08-30 RX ADMIN — HEPARIN SODIUM 19 UNITS/KG/HR: 10000 INJECTION, SOLUTION INTRAVENOUS at 02:01

## 2018-08-30 RX ADMIN — TIZANIDINE 4 MG: 4 TABLET ORAL at 22:46

## 2018-08-30 RX ADMIN — FAMOTIDINE 20 MG: 20 TABLET ORAL at 20:31

## 2018-08-30 ASSESSMENT — PAIN SCALES - GENERAL
PAINLEVEL_OUTOF10: 7
PAINLEVEL_OUTOF10: 8
PAINLEVEL_OUTOF10: 7
PAINLEVEL_OUTOF10: 5
PAINLEVEL_OUTOF10: 5
PAINLEVEL_OUTOF10: 6
PAINLEVEL_OUTOF10: 6
PAINLEVEL_OUTOF10: 4
PAINLEVEL_OUTOF10: 6
PAINLEVEL_OUTOF10: 5

## 2018-08-30 ASSESSMENT — PAIN DESCRIPTION - ORIENTATION
ORIENTATION: RIGHT;LEFT

## 2018-08-30 ASSESSMENT — PAIN DESCRIPTION - DESCRIPTORS
DESCRIPTORS: ACHING
DESCRIPTORS: ACHING
DESCRIPTORS: ACHING;DISCOMFORT
DESCRIPTORS: ACHING;SPASM

## 2018-08-30 ASSESSMENT — PAIN DESCRIPTION - PROGRESSION
CLINICAL_PROGRESSION: NOT CHANGED
CLINICAL_PROGRESSION: NOT CHANGED

## 2018-08-30 ASSESSMENT — PAIN DESCRIPTION - LOCATION
LOCATION: ANKLE
LOCATION: ANKLE;LEG
LOCATION: ANKLE
LOCATION: ANKLE;LEG

## 2018-08-30 ASSESSMENT — PAIN DESCRIPTION - PAIN TYPE
TYPE: SURGICAL PAIN
TYPE: ACUTE PAIN;SURGICAL PAIN
TYPE: ACUTE PAIN;SURGICAL PAIN
TYPE: SURGICAL PAIN

## 2018-08-30 ASSESSMENT — PAIN DESCRIPTION - FREQUENCY
FREQUENCY: INTERMITTENT
FREQUENCY: INTERMITTENT

## 2018-08-30 NOTE — PROGRESS NOTES
Assistance: Independent  Homemaking Assistance: Independent  Ambulation Assistance: Independent  Transfer Assistance: Independent  Additional Comments: very active PTA    Subjective:     Subjective: RN approved therapy session. Pt. just finished with OT prior to arrival. Pt. seated in [facility] and pleasantly agrees to therapy session. Pt. motivated. Pain:  Denies (Pt. reports numbness in B LEs). Social/Functional:  Lives With: Spouse  Type of Home: House  Home Layout: Two level, Able to Live on Main level with bedroom/bathroom  Home Access: Stairs to enter without rails  Entrance Stairs - Number of Steps: 2 ADIS     Objective:  Sit to Supine: Supervision  Scooting: Stand by assistance    Transfers  Lateral Transfers: Stand by assistance (WC to Bed. Good placement and management of slide board. No cues required for hand placement. Pt. does require cues to slow down for safety. Pt. Transferred to R side this session.)       Wheelchair Activities  All Wheelchair Parts Management: Pt. managing WC well this date. Propulsion 1  Propulsion: Manual  Level: Level Tile  Method: LUE;ERICAE  Level of Assistance: Supervision  Description/ Details: Good propulsion speed and manueverability. Pt. fatigued during propulsion and required 1 extended rest break. Distance: 250' x 1     Exercises:  Exercises  Comments: Pt. completed B LE ther ex 10x each consisting of toe curls, long arc quads, glute/quad sets, hip abd/add, and marches all to increase strength and endurance to enhance functional mobility. Activity Tolerance:  Activity Tolerance: Patient Tolerated treatment well;Patient limited by fatigue;Patient limited by endurance    Assessment: Body structures, Functions, Activity limitations: Decreased functional mobility , Decreased ROM, Decreased ADL status, Decreased strength, Decreased safe awareness, Decreased endurance, Decreased balance  Assessment: Pt. tolerated session well. Pt. very pleasant and motivated. Climbing T-Scale Score : 35.66  Mobility Inpatient CMS 0-100% Score: 67.36  Mobility Inpatient without Stair CMS G-Code Modifier : CL

## 2018-08-30 NOTE — DISCHARGE SUMMARY
Collection Time: 08/28/18  8:03 AM   Result Value Ref Range    Vitamin B-12 633 211 - 911 pg/mL    Folate 6.8 4.8 - 24.2 ng/mL   TYPE AND SCREEN    Collection Time: 08/28/18  8:03 AM   Result Value Ref Range    ABO A     Rh Factor POS     Antibody Screen NEG    Hemoglobin and Hematocrit, Blood    Collection Time: 08/28/18  2:15 PM   Result Value Ref Range    Hemoglobin 7.6 (L) 14.0 - 18.0 gm/dl    Hematocrit 22.5 (L) 42.0 - 52.0 %   APTT    Collection Time: 08/28/18  4:26 PM   Result Value Ref Range    aPTT 83.8 (H) 22.0 - 38.0 seconds   CBC    Collection Time: 08/29/18  5:24 AM   Result Value Ref Range    WBC 6.3 4.8 - 10.8 thou/mm3    RBC 2.51 (L) 4.70 - 6.10 mill/mm3    Hemoglobin 7.8 (L) 14.0 - 18.0 gm/dl    Hematocrit 23.7 (L) 42.0 - 52.0 %    MCV 94.4 (H) 80.0 - 94.0 fL    MCH 31.1 26.0 - 33.0 pg    MCHC 32.9 32.2 - 35.5 gm/dl    RDW-CV 12.5 11.5 - 14.5 %    RDW-SD 43.2 35.0 - 45.0 fL    Platelets 333 620 - 962 thou/mm3    MPV 9.3 (L) 9.4 - 12.4 fL   Basic Metabolic Panel    Collection Time: 08/29/18  5:24 AM   Result Value Ref Range    Sodium 133 (L) 135 - 145 meq/L    Potassium 4.3 3.5 - 5.2 meq/L    Chloride 97 (L) 98 - 111 meq/L    CO2 26 23 - 33 meq/L    Glucose 110 (H) 70 - 108 mg/dL    BUN 11 7 - 22 mg/dL    CREATININE 1.1 0.4 - 1.2 mg/dL    Calcium 8.4 (L) 8.5 - 10.5 mg/dL   APTT    Collection Time: 08/29/18  5:24 AM   Result Value Ref Range    aPTT 57.4 (H) 22.0 - 38.0 seconds   Anion Gap    Collection Time: 08/29/18  5:24 AM   Result Value Ref Range    Anion Gap 10.0 8.0 - 16.0 meq/L   Glomerular Filtration Rate, Estimated    Collection Time: 08/29/18  5:24 AM   Result Value Ref Range    Est, Glom Filt Rate 68 (A) ml/min/1.73m2   APTT    Collection Time: 08/29/18  4:17 PM   Result Value Ref Range    aPTT 81.3 (H) 22.0 - 38.0 seconds   APTT    Collection Time: 08/30/18  5:20 AM   Result Value Ref Range    aPTT 61.5 (H) 22.0 - 38.0 seconds       Discharge condition: stable  Disposition: TCU  Time spent on discharge: >35 minutes    Electronically signed by TEA Mcdaniel CNP on 8/30/2018 at 4:23 PM

## 2018-08-30 NOTE — PROGRESS NOTES
Kavita from [address] [place] [address] called states that a heparin drip will be okay for TCU but that they cannot take the pt. Due to no bed available,\"pt was readmitted to TCU\". Informed pt that he will be staying on 7k tonight.

## 2018-08-30 NOTE — PROGRESS NOTES
Heparin Consult  Lab Results   Component Value Date    APTT 81.3 08/29/2018     Lab Results   Component Value Date    HGB 7.8 08/29/2018    HCT 23.7 08/29/2018     08/29/2018    INR 1.18 08/26/2018       Current Rate: 19 units/kg/hr    Plan:  Rate: Continue at 19 units/kg/hr  Next aPTT: 0445 8/30/18 as previously ordered    [facility]  8/29/2018  9:37 PM

## 2018-08-30 NOTE — PROGRESS NOTES
in the last 72 hours. Troponin: No results for input(s): TROPONINI in the last 72 hours. BNP: No results for input(s): BNP in the last 72 hours. Lipids: No results for input(s): CHOL, HDL in the last 72 hours. Invalid input(s): LDLCALCU  INR:   No results for input(s): INR in the last 72 hours. Physical Exam:  LOWER EXTREMITY:  Vascular:   CFT brisk to exposed digits. Moderate lower extremity edema  Dermatologic:   Bilateral posterior splints intact, no strike through noted. KCI wound vac functioning at 125mmhg, scant drainage appreciated. Neurovascular:   Light touch sensation grossly intact at the digits bilaterally. Patient able to dorsiflex and plantarflex toes, shannon. Musculoskeletal:   Muscle strength deferred. External fixator intact to LLE. Foot and ankle in rectus alignment, shannon.     8/28/18          Assessment and Plan:   Patient Active Problem List:     Anticoagulated on Coumadin     Fall     Displaced intraarticular fracture of right calcaneus, initial encounter for open fracture     Open left tibial fracture     Open fracture of distal end of left fibula     Accidental fall from ladder     ELLEN (acute kidney injury) ([facility] [address]    PLAN:     1. s/p ORIF right calcaneus and closed reduction left pilon fracture with application of ex-fix and application of wound vac, left leg.   - Continue with current pain regimen  - Continue with IV antbiotics, ID following  - Bilateral dressing/splints intact, vac functioning at 125mmhg  - Continue strict bedrest, continue with PT/OT, upper extremity activities as tolerated     2. History of DVT   - H&H, PT/INR labs reviewed, hemoglobin 8.8, will continue to monitor   - Pharmacy to dose heparin, coumadin bridging    Dispo:   Discharge/Transfer TCU. Continue PT/OT. Left tibial pilon revision 9.4. 18.      [address] [address] [facility]   Electronically signed by Zakiya Stone DPM on 8/29/2018 at 9:59 PM

## 2018-08-30 NOTE — PROGRESS NOTES
[facility] [address] My[facility]inoj[facility]  Daily Progress Note      Pt Name: Denise Sprague  Medical Record Number: [id number]  Date of Birth 1955   Today's Date: 8/30/2018    HD: # 6    CC: \"I keep having these wild dreams\"    ASSESSMENT  1. Active Hospital Problems    Diagnosis Date Noted    H/O deep venous thrombosis [Z86.718] 08/28/2018    Acute blood loss as cause of postoperative anemia [D62] 08/28/2018    ELLEN (acute kidney injury) ([facility] [address] [N17.9] 08/25/2018    Anticoagulated on Coumadin [Z51.81, Z79.01] 08/24/2018    Displaced intraarticular fracture of right calcaneus, initial encounter for open fracture [S92.061B] 08/24/2018    Open left tibial fracture [S82.202B] 08/24/2018    Open fracture of distal end of left fibula [S82.832B] 08/24/2018    Accidental fall from ladder [F68. XXXA] 08/24/2018    Fall [H97. XXXA]        PROCEDURES  08/25/18 - 1. ORIF Open Right Calcaneal Fracture  2. Layered Closure Right Skin laceration 5x1cm  3. Open Reduction Left Pilon Fracture with External fixator application  4. Application NPWT <86HZ sq    PLAN  Admitted under trauma services to [address] [place] [address]     Right open calcaneus fracture & open left tibial and fibula fracture              - Podiatry managing   - s/p ORIF 8/25              - NV checks   - NWB to BLE   - planning OR on Tuesday   - wound vac changes T, Th, Sat  Acute Blood Loss Anemia; post-procedural   -H&H monitoring   -not checked today   -transfuse if <7  ID consulted per family request   - continue Ancef  General Diet  Up to chair, NWB BLE  Repeat labs in AM including INR  Heparin bridge to Coumadin  Prophylaxis: IS, C&DB, Pepcid, stool softeners  Pain control              - Fentanyl and Percocet PRN  PT/OT continue to treat  Discharge disposition pending clinical course   - TCU today      Susu Ruff continues on 7K as there was no bed available on TCU last night.  He remains stable from a trauma perspective this AM. Has been having vivid dreams and per wife has not been sleeping well. Jerks and wakes up multiple times per hour. Flexeril changed to Zanaflex for this reason. Bilateral lower extremities are wrapped in Ace bandages with hemovac present on LLE. Serosanguineous drainage noted in atrium, with minimal amount noted in tubing. Podiatry managing. Patient continues on Ancef antibiotics at this time. He is tolerating a general diet this morning, and has had a bowel movement. Denies any nausea, vomiting, or abdominal discomfort at this time. He further denies any chest pain or shortness of breath. Wt Readings from Last 3 Encounters:   08/25/18 175 lb (79.4 kg)   11/26/12 165 lb (74.8 kg)     Temp Readings from Last 3 Encounters:   08/30/18 98 °F (36.7 °C) (Oral)   11/26/12 98 °F (36.7 °C) (Oral)     BP Readings from Last 3 Encounters:   08/30/18 125/64   08/25/18 (!) 103/58   11/26/12 (!) 147/100     Pulse Readings from Last 3 Encounters:   08/30/18 81   11/26/12 63       24 HR INTAKE/OUTPUT :     Intake/Output Summary (Last 24 hours) at 08/30/18 0955  Last data filed at 08/30/18 0555   Gross per 24 hour   Intake             1992 ml   Output             1250 ml   Net              742 ml   BM = 1      DIET GENERAL;    OBJECTIVE  CURRENT VITALS /64   Pulse 81   Temp 98 °F (36.7 °C) (Oral)   Resp 16   Ht 5' 4\" (1.626 m)   Wt 175 lb (79.4 kg)   SpO2 95%   BMI 30.04 kg/m²        GENERAL: alert, cooperative, no distress  LUNGS: clear to auscultation bilaterally- no wheezes, rales or rhonchi, normal air movement, no respiratory distress  HEART: normal rate, normal S1 and S2, no gallops, intact distal pulses  ABDOMEN: soft, non-tender, non-distended, normal bowel sounds, no masses or organomegaly  WOUNDS: Bilateral lower extremities with Ace wraps. Vac present on LLE with drainage in atrium noted. Posterior splint to RLE. External fixator to LLE. Surgical wounds not visualized.    EXTREMITY: no cyanosis, no clubbing and no edema to exposed areas. Toes pink, warm and dry. Capillary refill < 3seconds. LE's elevated, NVI bilaterally      LABS  CBC :   Recent Labs      08/28/18   0416  08/28/18   1415  08/29/18   0524   WBC  6.9   --   6.3   HGB  7.4*  7.6*  7.8*   HCT  22.1*  22.5*  23.7*   MCV  94.0   --   94.4*   PLT  153   --   205     BMP:   Recent Labs      08/28/18   0416  08/29/18   0524   NA  135  133*   K  4.2  4.3   CL  98  97*   CO2  26  26   BUN  11  11   CREATININE  1.0  1.1     COAGS:   Recent Labs      08/29/18   0524  08/29/18   1617  08/30/18   0520   APTT  57.4*  81.3*  61.5*     Pancreas/HFP:  No results for input(s): LIPASE, AMYLASE in the last 72 hours. No results for input(s): AST, ALT, BILIDIR, BILITOT, ALKPHOS in the last 72 hours. RADIOLOGY:  No new results      Electronically signed by TEA Phelps CNP on 8/30/2018 at 9:55 AM     Patient seen and examined independently by me early this AM. Above discussed and I agree with Kelly Cabello CNP. See my additional comments below for updated orders and plan. Labs, cultures, and radiographs where available were reviewed. I discussed patient concerns with the patient's nurse and instructions were given. Please see our orders for the updated patient care plan. - Pain control doing well. Planning surgical intervention next Tuesday with podiatry. Nonweightbearing bilateral lower extremity. Planning TCU later this morning. Continue therapy. Okay for discharge/transfer.     Electronically signed by Eloise Garcia MD on 8/30/2018 at 5:38 PM

## 2018-08-30 NOTE — PROGRESS NOTES
Tong Mariee 61  INPATIENT OCCUPATIONAL THERAPY  [facility] [place] ORTHOPEDICS 7K  DAILY NOTE    Time:  Time In: 1400  Time Out: 1424  Timed Code Treatment Minutes: 24 Minutes  Minutes: 24          Date: 2018  Patient Name: Cassie Haile,   Gender: male      [facility]  MRN: [id number]  : 1955  (59 y.o.)  Referring Practitioner: Zoie Bolivar DPM  Diagnosis: Fall from Ladder, initial encounter  Additional Pertinent Hx: Albertina Cohen is a 59year old male presenting to the Emergency Department via EMS for evaluation of potential injuries sustained in a fall from a ladder this evening. He reports that he was taking down a wreath when he fell from the ladder, landing directly onto his feet. He denies hitting his head or losing consciousness. Denies headache, visual changes, paresthesias, nausea or vomiting. Had immediate excruciating bilateral ankle pain that has subsided slightly after fentanyl administration in EMS. Presents to the ER with bilateral air splints from the EMS to his ankles/lower legs. Pulses are palpable. Wound noted to right posterior ankle/heel with no obvious bone protruding. Left medial ankle noted to have wound with distal end of tibia protruding.      Past Medical History:   Diagnosis Date    Embolism - blood clot     rt calf and groin    Hyperlipidemia      Past Surgical History:   Procedure Laterality Date    HIP SURGERY      lt    RI OFFICE/OUTPT VISIT,PROCEDURE ONLY Right 2018    OPEN REDUCTION INTERNAL FIXATION OF PILON FRACTURE WITH EXTERNAL FIXATOR AND WOUND VAC PLACEMENT, RIGHT CALCANEOUS OPEN REDUCTION INTERNAL FIXATION performed by Zoie Bolivar DPM at [facility]       Restrictions/Precautions:  Weight Bearing, General Precautions, Fall Risk     Right Lower Extremity Weight Bearing: Non Weight Bearing     Left Lower Extremity Weight Bearing: Non Weight Bearing        Other position/activity restrictions: wound vac       Prior Level of Function:  ADL

## 2018-08-31 LAB
APTT: 55.2 SECONDS (ref 22–38)
APTT: 63.9 SECONDS (ref 22–38)
APTT: 80.8 SECONDS (ref 22–38)
APTT: 82.1 SECONDS (ref 22–38)

## 2018-08-31 PROCEDURE — 97535 SELF CARE MNGMENT TRAINING: CPT

## 2018-08-31 PROCEDURE — 97530 THERAPEUTIC ACTIVITIES: CPT

## 2018-08-31 PROCEDURE — 36415 COLL VENOUS BLD VENIPUNCTURE: CPT

## 2018-08-31 PROCEDURE — 6370000000 HC RX 637 (ALT 250 FOR IP): Performed by: INTERNAL MEDICINE

## 2018-08-31 PROCEDURE — 85730 THROMBOPLASTIN TIME PARTIAL: CPT

## 2018-08-31 PROCEDURE — 6370000000 HC RX 637 (ALT 250 FOR IP): Performed by: FAMILY MEDICINE

## 2018-08-31 PROCEDURE — 97162 PT EVAL MOD COMPLEX 30 MIN: CPT

## 2018-08-31 PROCEDURE — 2709999900 HC NON-CHARGEABLE SUPPLY

## 2018-08-31 PROCEDURE — 6370000000 HC RX 637 (ALT 250 FOR IP): Performed by: SURGERY

## 2018-08-31 PROCEDURE — 97110 THERAPEUTIC EXERCISES: CPT

## 2018-08-31 PROCEDURE — 6360000002 HC RX W HCPCS: Performed by: PHARMACIST

## 2018-08-31 PROCEDURE — 97166 OT EVAL MOD COMPLEX 45 MIN: CPT

## 2018-08-31 PROCEDURE — 1290000000 HC SEMI PRIVATE OTHER R&B

## 2018-08-31 PROCEDURE — 97605 NEG PRS WND THER DME<=50SQCM: CPT

## 2018-08-31 RX ORDER — HYDROCODONE BITARTRATE AND ACETAMINOPHEN 5; 325 MG/1; MG/1
2 TABLET ORAL EVERY 4 HOURS PRN
Status: DISCONTINUED | OUTPATIENT
Start: 2018-08-31 | End: 2018-09-04 | Stop reason: HOSPADM

## 2018-08-31 RX ORDER — HYDROCODONE BITARTRATE AND ACETAMINOPHEN 5; 325 MG/1; MG/1
1 TABLET ORAL EVERY 4 HOURS PRN
Status: DISCONTINUED | OUTPATIENT
Start: 2018-08-31 | End: 2018-09-04 | Stop reason: HOSPADM

## 2018-08-31 RX ORDER — HEPARIN SODIUM 10000 [USP'U]/100ML
24 INJECTION, SOLUTION INTRAVENOUS CONTINUOUS
Status: DISPENSED | OUTPATIENT
Start: 2018-08-31 | End: 2018-09-04

## 2018-08-31 RX ADMIN — FAMOTIDINE 20 MG: 20 TABLET ORAL at 21:47

## 2018-08-31 RX ADMIN — ATORVASTATIN CALCIUM 40 MG: 40 TABLET, FILM COATED ORAL at 09:29

## 2018-08-31 RX ADMIN — HYDROCODONE BITARTRATE AND ACETAMINOPHEN 1 TABLET: 5; 325 TABLET ORAL at 19:26

## 2018-08-31 RX ADMIN — DOCUSATE SODIUM 100 MG: 100 CAPSULE, LIQUID FILLED ORAL at 21:47

## 2018-08-31 RX ADMIN — OXYCODONE HYDROCHLORIDE AND ACETAMINOPHEN 2 TABLET: 5; 325 TABLET ORAL at 05:21

## 2018-08-31 RX ADMIN — DOCUSATE SODIUM 100 MG: 100 CAPSULE, LIQUID FILLED ORAL at 09:28

## 2018-08-31 RX ADMIN — HYDROCODONE BITARTRATE AND ACETAMINOPHEN 2 TABLET: 5; 325 TABLET ORAL at 14:33

## 2018-08-31 RX ADMIN — TIZANIDINE 4 MG: 4 TABLET ORAL at 21:47

## 2018-08-31 RX ADMIN — HYDROCODONE BITARTRATE AND ACETAMINOPHEN 1 TABLET: 5; 325 TABLET ORAL at 21:48

## 2018-08-31 RX ADMIN — CEPHALEXIN 500 MG: 500 CAPSULE ORAL at 05:22

## 2018-08-31 RX ADMIN — NALOXEGOL OXALATE 12.5 MG: 12.5 TABLET, FILM COATED ORAL at 09:29

## 2018-08-31 RX ADMIN — SENNOSIDES 34.4 MG: 8.6 TABLET, FILM COATED ORAL at 21:47

## 2018-08-31 RX ADMIN — FAMOTIDINE 20 MG: 20 TABLET ORAL at 09:29

## 2018-08-31 RX ADMIN — CEPHALEXIN 500 MG: 500 CAPSULE ORAL at 14:32

## 2018-08-31 RX ADMIN — TIZANIDINE 4 MG: 4 TABLET ORAL at 16:22

## 2018-08-31 RX ADMIN — HEPARIN SODIUM AND DEXTROSE 21 UNITS/KG/HR: 10000; 5 INJECTION INTRAVENOUS at 11:23

## 2018-08-31 RX ADMIN — DOCUSATE SODIUM 100 MG: 100 CAPSULE, LIQUID FILLED ORAL at 14:32

## 2018-08-31 RX ADMIN — POLYETHYLENE GLYCOL 3350 17 G: 17 POWDER, FOR SOLUTION ORAL at 21:47

## 2018-08-31 RX ADMIN — CEPHALEXIN 500 MG: 500 CAPSULE ORAL at 21:47

## 2018-08-31 ASSESSMENT — PAIN DESCRIPTION - PAIN TYPE: TYPE: SURGICAL PAIN

## 2018-08-31 ASSESSMENT — PAIN SCALES - GENERAL
PAINLEVEL_OUTOF10: 2
PAINLEVEL_OUTOF10: 7
PAINLEVEL_OUTOF10: 4
PAINLEVEL_OUTOF10: 2
PAINLEVEL_OUTOF10: 8

## 2018-08-31 ASSESSMENT — PAIN DESCRIPTION - ORIENTATION: ORIENTATION: LEFT

## 2018-08-31 ASSESSMENT — PAIN DESCRIPTION - LOCATION: LOCATION: LEG

## 2018-08-31 NOTE — PROGRESS NOTES
Tong Mariee 61  INPATIENT OCCUPATIONAL THERAPY  [facility]    Time:  Time In: 1100  Time Out: 1145  Timed Code Treatment Minutes: 35 Minutes  Minutes: 45  Time In: 1330  Time Out: 1400  Timed Code Treatment Minutes: 30 Minutes  Minutes: 30   Split session d/t fatigue. Date: 2018  Patient Name: Stephane Monroe,   Gender: male      MRN: [id number]  : 1955  (59 y.o.)  Referring Practitioner: Yamilet Hernandez MD; Attending: dr. Elizabeth Byrd     Additional Pertinent Hx: Maverick Walker is a 59year old male presenting to the Emergency Department via EMS for evaluation of potential injuries sustained in a fall from a ladder this evening. He reports that he was taking down a wreath when he fell from the ladder, landing directly onto his feet. He denies hitting his head or losing consciousness. Denies headache, visual changes, paresthesias, nausea or vomiting. Had immediate excruciating bilateral ankle pain that has subsided slightly after fentanyl administration in EMS. Presents to the ER with bilateral air splints from the EMS to his ankles/lower legs. Pulses are palpable. Wound noted to right posterior ankle/heel with no obvious bone protruding. Left medial ankle noted to have wound with distal end of tibia protruding. Admitted 18 to TCU. Restrictions/Precautions:  Weight Bearing, General Precautions, Fall Risk     Right Lower Extremity Weight Bearing: Non Weight Bearing     Left Lower Extremity Weight Bearing: Non Weight Bearing        Other position/activity restrictions: wound vac L ankle, s/p ORIF Right Calcaneal Fracture, Layered Closure Right Skin laceration, ORIF Left Pilon Fracture with External fixator application .         Past Medical History:   Diagnosis Date    Embolism - blood clot     rt calf and groin    Hyperlipidemia      Past Surgical History:   Procedure Laterality Date    HIP SURGERY      lt    [place] OFFICE/OUTPT VISIT,PROCEDURE ONLY Right 2018 SBA)  Toilet Transfers  Toilet - Technique: Lateral  Equipment Used: Standard toilet  Toilet Transfer: Stand by assistance  Toilet Transfers Comments: close SBA for safety standard w/c from standard toilet    Balance  Sitting Balance: Stand by assistance  Standing Balance: Unable to assess(comment)           Functional Mobility  Functional - Mobility Device: Wheelchair  Activity: To/from bathroom  Assist Level: Stand by assistance      Activity Tolerance:  Activity Tolerance: Patient limited by fatigue  Activity Tolerance: Eval completed, tx initiated: Increased fatigue this date, split session. Pt completed ADL and toilteitng with various t/fs AM session and washing hair in PM session with various t/fs, very fatigued from increased activity this date. Assessment:  Assessment: This 59year old demonstrates decreased ability to complete all self cares, transfers and mobility. Pt is NWB through B LEs. Pt requires skilled OT intervention to increase indep and safety with all self cares, transfers and w/c mobility to decrease fall risk and return to Indep PLOF. Pt is very motivated to return to Indep PLOF. Performance deficits / Impairments: Decreased functional mobility , Decreased ADL status, Decreased endurance, Decreased high-level IADLs  Prognosis: Good  Discharge Recommendations: 24 hour supervision or assist    Clinical Decision Making: Clinical Decision making was of Moderate Complexity as the result of analysis of data from a detailed assessment, a consideration of several treatment options, the presence of comorbidities affecting the plan of care and the need for minimal to moderate modifications or assistance required to complete the evaluation.     Patient Education:  Patient Education: OT POC, safety with t/fs    Equipment Recommendations:  Equipment Needed: Yes  Mobility Devices: Hospital Bed, Wheelchair  Other: Pt will need w/c at discharge (27 inch doorway to bedroom/bathroom, 36inch for other

## 2018-08-31 NOTE — PROGRESS NOTES
Progress Note  8/31/2018 10:30 AM  Subjective:   Admit Date: 8/30/2018  PCP: Andrew Quinn MD    Interval History:     8.31.18  Patient is seen on behalf of Dr. Bernhard Aase. He is s/p ORIF right calcaneus and closed reduction left pilon fracture with application of ex-fix and application of wound vac, left leg (DOS: 8.25.18). He was transferred to [facility] yesterday. He relates that he is working with PT/OT, and his pain is well controlled. No new complaints. 8.27.18   Patient seen at bedside this morning on behalf of Dr. Darshna Cortes. Patient is 2 days s/p ORIF right calcaneus and closed reduction left pilon fracture with application of ex-fix and application of wound vac, left leg (DOS: 8.25.18). Patient reports pain is well controlled with combination of Tramadol and Percocet. Patient and family express concern about discharge planning and would like to explore options for rehab/nursing facility. Patient states he worked with PT/OT yesterday on maneuvering in the bed and arm exercises with a resistance band. Patient denies F/V/N/C/SOB/CP.     8.26.18  Patient seen at bedside this morning on behalf of Dr. Bernhard Aase. Patient is 1 day s/p ORIF right calcaneus and closed reduction left pilon fracture with application of ex-fix and application of wound vac, left leg. Patient reports that his pain is currently well controlled and he slept well overnight. Patient has not had BM since surgery, but is tolerating a diet and is passing flatus. Patient denies calf pain. Patient denies F/V/N/C/SOB/CP. Patient reports numbness and tingling in both feet. No other complaints at this time. HISTORY OF PRESENT ILLNESS:    The patient is a 59 y.o. male with significant past medical history of DVT who presents with bilateral open fractures. Patient states that he was climbing a ladder around 9pm this evening and fell from a height of 10-12 feet. Patient states that he fell directly on his feet onto concrete.  Patient denies any other distress  Abdomen: soft, non-tender, non-distended  Extremities: no cyanosis, clubbing or edema, pulse   Skin: warm and dry, no rash or erythema  Head: normocephalic and atraumatic  Eyes: pupils equal, round, and reactive to light  Neck: supple and non-tender without mass, no thyromegaly   Neurological: alert, oriented, normal speech, no focal findings or movement disorder noted     LOWER EXTREMITY:  Vascular: CFT brisk to exposed digits.     Dermatologic:   Right leg incision edges are well coapted. Sutures intact. No areas of dehiscence. No drainage. Wound vac intact to LLE, functioning properly.      Neurovascular: Light touch sensation grossly intact at the digits bilaterally. Patient able to dorsiflex and plantarflex toes, shannon.       Musculoskeletal: Muscle strength deferred. External fixator intact to LLE. Foot and ankle in rectus alignment, shannon. Right posterior 8/31/18    Right plantar 8/31/18          Assessment and Plan:   Patient Active Problem List:     Anticoagulated on Coumadin     Fall     Displaced intraarticular fracture of right calcaneus, initial encounter for open fracture     Open left tibial fracture     Open fracture of distal end of left fibula     Accidental fall from ladder     ELLEN (acute kidney injury) ([facility] [address]    PLAN:     1. s/p ORIF right calcaneus and closed reduction left pilon fracture with application of ex-fix and application of wound vac, left leg.   -Continue with current pain regimen  -Continue with IV antbiotics, ID following  -Right leg dressing removed, and incisions examined. Dressing reapplied with gauze, kerlix, cast padding, long leg splint and ace  -Left leg wound vac changed by wound ostomy this morning- intact and functioning.   -Continue strict bedrest, continue with PT/OT, upper extremity activities as tolerated       2.  History of DVT   - H&H, PT/INR labs reviewed, hemoglobin 7.8, will continue to monitor   -Pharmacy to dose heparin    Dispo:   Patient to OR on

## 2018-08-31 NOTE — PLAN OF CARE
Problem: Risk for Impaired Skin Integrity  Goal: Tissue integrity - skin and mucous membranes  Structural intactness and normal physiological function of skin and  mucous membranes. Outcome: Ongoing  Into change NPWT left medial leg wound. Spoke with Dr. Sapphire Humphreys, this am, okay to change VAC to left leg. Plans for patient to go to Surgery on Tuesday 9/4/18. VAC to stay in place until the day of Surgery. Also spoke with [facility] Podiatry Resident, to change dressing to right leg. Update primary RN. Removed wraps and NPWT dressing. Cleansed and dried wound to right lateral leg. Wound bed noted to be 80% red and 20% black. An incisional line with 2 sutures at 3 oclock of the wound. Applied skin prep and premium wafer to polly wound. Black foam applied to wound bed, secured with transparent drape, and suction noted at 125. Painted pin sites with betadine and covered with dry 4x4 gauze. Wrapped left leg with kerlix, Surepress padding and ACE wrap. Then applied the splint, and wrapped with the ACE wraps. Elevated bilateral heels with pillow support. Will continue to follow for wound VAC.

## 2018-08-31 NOTE — PROGRESS NOTES
Exceptions  LLE General AROM: Hip WFL, knee and ankle NT due to posterior splint (goes superior to knee joint)        B hips 4/5, knee and ankle NT due to splint and surgery restrictions       RLE Tone: Normotonic  LLE Tone: Normotonic       Balance  Sitting - Static: Good  Sitting - Dynamic: Good  Comments: Pt sits EOB with good balance, donns shorts with good balance    Supine to Sit: Stand by assistance (HOB flat, no bed rail)  Scooting: Stand by assistance (to EOB)    Transfers  Lateral Transfers: Stand by assistance (bed to w/c to R side, no sliding board, pt scoots self into w/c)        Wheelchair Activities  Propulsion: Yes    Propulsion 1  Propulsion: Manual  Level: Level Tile  Method: ANOOP GARZA  Level of Assistance: Stand by assistance  Description/ Details: Good propulsion speed and manueverability. Pt fatigued after propulsion  Distance: 250' x 1            Exercises:  Comments: Pt performs B LE AROM while supine in bed: quad/glut sets, SLR, hip abd/add all x 15 reps to increase strength for improved transfers. Activity Tolerance:  Activity Tolerance: Patient Tolerated treatment well;Patient limited by fatigue    Treatment Initiated: See above exercises. Assessment: Body structures, Functions, Activity limitations: Decreased functional mobility , Decreased endurance, Decreased strength  Assessment: Pt tolerates session well, limited by NWB status to B LE's, weakness and fatigue. Pt is very motivated and has good upper body strength, transfers without sliding board today. PT to increase strength, activity tolerance, transfers from various height surfaces and w/c over various surfaces to increase [place] and ensure safe DC home.   Prognosis: Excellent    Clinical Presentation: Moderate - Evolving with Changing Characteristics: Hip WFL, knee and ankle NT due to posterior splint (goes superior to knee joint)    Decision Making: High Complexitybased on patient assessment and decision

## 2018-08-31 NOTE — PLAN OF CARE
Problem: Nutrition  Goal: Optimal nutrition therapy  Outcome: Ongoing  Nutrition Problem: Increased nutrient needs  Intervention: Food and/or Nutrient Delivery: Continue current diet, Start ONS  Nutritional Goals: Patient will consume 75% or more of meals during LOS

## 2018-09-01 PROBLEM — F41.9 ANXIETY ABOUT BLUSHING: Status: ACTIVE | Noted: 2018-09-01

## 2018-09-01 LAB
ANION GAP SERPL CALCULATED.3IONS-SCNC: 13 MEQ/L (ref 8–16)
APTT: 82.8 SECONDS (ref 22–38)
APTT: 93.9 SECONDS (ref 22–38)
BUN BLDV-MCNC: 16 MG/DL (ref 7–22)
CALCIUM SERPL-MCNC: 9.1 MG/DL (ref 8.5–10.5)
CHLORIDE BLD-SCNC: 96 MEQ/L (ref 98–111)
CO2: 24 MEQ/L (ref 23–33)
CREAT SERPL-MCNC: 1.1 MG/DL (ref 0.4–1.2)
GFR SERPL CREATININE-BSD FRML MDRD: 68 ML/MIN/1.73M2
GLUCOSE BLD-MCNC: 110 MG/DL (ref 70–108)
HCT VFR BLD CALC: 28.3 % (ref 42–52)
HEMOGLOBIN: 9.1 GM/DL (ref 14–18)
POTASSIUM SERPL-SCNC: 4.8 MEQ/L (ref 3.5–5.2)
SODIUM BLD-SCNC: 133 MEQ/L (ref 135–145)

## 2018-09-01 PROCEDURE — 6370000000 HC RX 637 (ALT 250 FOR IP): Performed by: SURGERY

## 2018-09-01 PROCEDURE — 6370000000 HC RX 637 (ALT 250 FOR IP): Performed by: FAMILY MEDICINE

## 2018-09-01 PROCEDURE — 85018 HEMOGLOBIN: CPT

## 2018-09-01 PROCEDURE — 85014 HEMATOCRIT: CPT

## 2018-09-01 PROCEDURE — 6360000002 HC RX W HCPCS: Performed by: PHARMACIST

## 2018-09-01 PROCEDURE — 6370000000 HC RX 637 (ALT 250 FOR IP): Performed by: INTERNAL MEDICINE

## 2018-09-01 PROCEDURE — 80048 BASIC METABOLIC PNL TOTAL CA: CPT

## 2018-09-01 PROCEDURE — 36415 COLL VENOUS BLD VENIPUNCTURE: CPT

## 2018-09-01 PROCEDURE — 2709999900 HC NON-CHARGEABLE SUPPLY

## 2018-09-01 PROCEDURE — 1290000000 HC SEMI PRIVATE OTHER R&B

## 2018-09-01 PROCEDURE — 97110 THERAPEUTIC EXERCISES: CPT

## 2018-09-01 PROCEDURE — 85730 THROMBOPLASTIN TIME PARTIAL: CPT

## 2018-09-01 PROCEDURE — 97530 THERAPEUTIC ACTIVITIES: CPT

## 2018-09-01 RX ORDER — HYDROXYZINE PAMOATE 25 MG/1
25 CAPSULE ORAL 4 TIMES DAILY PRN
Status: DISCONTINUED | OUTPATIENT
Start: 2018-09-01 | End: 2018-09-04 | Stop reason: HOSPADM

## 2018-09-01 RX ADMIN — TIZANIDINE 4 MG: 4 TABLET ORAL at 04:01

## 2018-09-01 RX ADMIN — HEPARIN SODIUM AND DEXTROSE 23 UNITS/KG/HR: 10000; 5 INJECTION INTRAVENOUS at 17:25

## 2018-09-01 RX ADMIN — DOCUSATE SODIUM 100 MG: 100 CAPSULE, LIQUID FILLED ORAL at 16:12

## 2018-09-01 RX ADMIN — CEPHALEXIN 500 MG: 500 CAPSULE ORAL at 21:45

## 2018-09-01 RX ADMIN — CEPHALEXIN 500 MG: 500 CAPSULE ORAL at 16:11

## 2018-09-01 RX ADMIN — ATORVASTATIN CALCIUM 40 MG: 40 TABLET, FILM COATED ORAL at 08:26

## 2018-09-01 RX ADMIN — HEPARIN SODIUM AND DEXTROSE 23 UNITS/KG/HR: 10000; 5 INJECTION INTRAVENOUS at 03:13

## 2018-09-01 RX ADMIN — CEPHALEXIN 500 MG: 500 CAPSULE ORAL at 05:01

## 2018-09-01 RX ADMIN — HYDROCODONE BITARTRATE AND ACETAMINOPHEN 1 TABLET: 5; 325 TABLET ORAL at 21:42

## 2018-09-01 RX ADMIN — HYDROCODONE BITARTRATE AND ACETAMINOPHEN 1 TABLET: 5; 325 TABLET ORAL at 03:13

## 2018-09-01 RX ADMIN — TIZANIDINE 4 MG: 4 TABLET ORAL at 22:21

## 2018-09-01 RX ADMIN — DOCUSATE SODIUM 100 MG: 100 CAPSULE, LIQUID FILLED ORAL at 21:50

## 2018-09-01 RX ADMIN — FAMOTIDINE 20 MG: 20 TABLET ORAL at 08:26

## 2018-09-01 RX ADMIN — TIZANIDINE 4 MG: 4 TABLET ORAL at 16:12

## 2018-09-01 RX ADMIN — HYDROCODONE BITARTRATE AND ACETAMINOPHEN 1 TABLET: 5; 325 TABLET ORAL at 07:41

## 2018-09-01 RX ADMIN — SENNOSIDES 34.4 MG: 8.6 TABLET, FILM COATED ORAL at 21:45

## 2018-09-01 RX ADMIN — FAMOTIDINE 20 MG: 20 TABLET ORAL at 21:50

## 2018-09-01 RX ADMIN — NALOXEGOL OXALATE 12.5 MG: 12.5 TABLET, FILM COATED ORAL at 08:26

## 2018-09-01 RX ADMIN — DOCUSATE SODIUM 100 MG: 100 CAPSULE, LIQUID FILLED ORAL at 08:26

## 2018-09-01 RX ADMIN — HYDROCODONE BITARTRATE AND ACETAMINOPHEN 2 TABLET: 5; 325 TABLET ORAL at 16:55

## 2018-09-01 RX ADMIN — POLYETHYLENE GLYCOL 3350 17 G: 17 POWDER, FOR SOLUTION ORAL at 08:27

## 2018-09-01 RX ADMIN — HYDROXYZINE PAMOATE 25 MG: 25 CAPSULE ORAL at 21:42

## 2018-09-01 RX ADMIN — HYDROCODONE BITARTRATE AND ACETAMINOPHEN 1 TABLET: 5; 325 TABLET ORAL at 12:22

## 2018-09-01 RX ADMIN — TIZANIDINE 4 MG: 4 TABLET ORAL at 10:13

## 2018-09-01 ASSESSMENT — PAIN DESCRIPTION - DESCRIPTORS: DESCRIPTORS: ACHING;DISCOMFORT

## 2018-09-01 ASSESSMENT — PAIN SCALES - GENERAL
PAINLEVEL_OUTOF10: 8
PAINLEVEL_OUTOF10: 6
PAINLEVEL_OUTOF10: 5
PAINLEVEL_OUTOF10: 0
PAINLEVEL_OUTOF10: 4
PAINLEVEL_OUTOF10: 5

## 2018-09-01 ASSESSMENT — PAIN DESCRIPTION - ORIENTATION: ORIENTATION: RIGHT;LEFT

## 2018-09-01 ASSESSMENT — PAIN DESCRIPTION - PAIN TYPE: TYPE: ACUTE PAIN;SURGICAL PAIN

## 2018-09-01 ASSESSMENT — PAIN DESCRIPTION - LOCATION: LOCATION: FOOT;LEG

## 2018-09-01 ASSESSMENT — PAIN SCALES - WONG BAKER: WONGBAKER_NUMERICALRESPONSE: 2

## 2018-09-01 NOTE — PROGRESS NOTES
Heparin Consult  Lab Results   Component Value Date    APTT 82.1 08/31/2018     Lab Results   Component Value Date    HGB 7.8 08/29/2018    HCT 23.7 08/29/2018     08/29/2018    INR 1.18 08/26/2018       Current Rate: 23 units/kg/hr    Plan:  No change in heparin drip rate, continue at 23 units/kg/hr  Next aPTT: 0445

## 2018-09-01 NOTE — PROGRESS NOTES
Libertad Tony 61  INPATIENT OCCUPATIONAL THERAPY  [facility] TCU 8E  DAILY NOTE    Time:  Time In: 1100  Time Out: 1140  Timed Code Treatment Minutes: 40 Minutes  Minutes: 40          Date: 2018  Patient Name: Gisel Rosenthal,   Gender: male      Room: -72/072-A  MRN: [id number]  : 1955  (59 y.o.)  Referring Practitioner: Rosamaria Trotter MD; Attending: dr. Missy Hernandez     Additional Pertinent Hx: Sadaf Herrera is a 59year old male presenting to the Emergency Department via EMS for evaluation of potential injuries sustained in a fall from a ladder this evening. He reports that he was taking down a wreath when he fell from the ladder, landing directly onto his feet. He denies hitting his head or losing consciousness. Denies headache, visual changes, paresthesias, nausea or vomiting. Had immediate excruciating bilateral ankle pain that has subsided slightly after fentanyl administration in EMS. Presents to the ER with bilateral air splints from the EMS to his ankles/lower legs. Pulses are palpable. Wound noted to right posterior ankle/heel with no obvious bone protruding. Left medial ankle noted to have wound with distal end of tibia protruding. Admitted 18 to TCU.     Past Medical History:   Diagnosis Date    Embolism - blood clot     rt calf and groin    Hyperlipidemia      Past Surgical History:   Procedure Laterality Date    HIP SURGERY      lt    [place] OFFICE/OUTPT VISIT,PROCEDURE ONLY Right 2018    OPEN REDUCTION INTERNAL FIXATION OF PILON FRACTURE WITH EXTERNAL FIXATOR AND WOUND VAC PLACEMENT, RIGHT CALCANEOUS OPEN REDUCTION INTERNAL FIXATION performed by Kia Yu DPM at [facility]       Restrictions/Precautions:  Weight Bearing, General Precautions, Fall Risk     Right Lower Extremity Weight Bearing: Non Weight Bearing     Left Lower Extremity Weight Bearing: Non Weight Bearing        Other position/activity restrictions: wound vac L ankle, s/p ORIF Right Calcaneal Fracture, endurance, Decreased high-level IADLs  Prognosis: Good  Discharge Recommendations: 24 hour supervision or assist    Patient Education:  Patient Education: home adaptations    Equipment Recommendations:  Equipment Needed: Yes  Mobility Devices: Hospital Bed, Wheelchair  Other: Pt will need w/c at discharge (27 inch doorway to bedroom/bathroom, 36inch for other doorways). Also will need hospital bed. Safety:  Safety Devices in place: Yes  Type of devices: All fall risk precautions in place, Call light within reach, Gait belt, Nurse notified, Patient at risk for falls, Chair alarm in place, Left in chair    Plan:  Times per week: 6x  Current Treatment Recommendations: Strengthening, Endurance Training, Patient/Caregiver Education & Training, Equipment Evaluation, Education, & procurement, Self-Care / ADL, Home Management Training, Functional Mobility Training, Safety Education & Training, Balance Training    Goals:       Short term goals  Time Frame for Short term goals: 1 week  Short term goal 1: Pt will complete BUE strengthening exercises with min vc for technique to increase indep and safety with transfers. Short term goal 2: Pt will complete dynamic sitting task x 10 minutes Mod Indep to increase indep and safety with grooming. Short term goal 3: Pt will complete lateral transfers to/from various surfaces with Sand min vc for technique to increase indep and safety with toileting. Short term goal 4: Pt will complete LB dressing with CGA and min vc for technique to increase indep and safety within home environment. Long term goals  Time Frame for Long term goals : 2 weeks  Long term goal 1: Pt will complete ADL routine with s/u to increase indep with self care. Long term goal 2: Pt will complete lateral transfers to/from various surfaces with Natan and 0 vc for technique to increase indep and safety with toileting.

## 2018-09-01 NOTE — PROGRESS NOTES
Heparin Consult  Lab Results   Component Value Date    APTT 82.8 09/01/2018     Lab Results   Component Value Date    HGB 7.8 08/29/2018    HCT 23.7 08/29/2018     08/29/2018    INR 1.18 08/26/2018       Current Rate: 23 units/kg/hr     Plan:  Continue Rate: 23 units/kg/hr  Next aPTT: 1800

## 2018-09-01 NOTE — PROGRESS NOTES
Heparin Consult  Lab Results   Component Value Date    APTT 93.9 09/01/2018     Lab Results   Component Value Date    HGB 9.1 09/01/2018    HCT 28.3 09/01/2018     08/29/2018    INR 1.18 08/26/2018       Current Rate: 23 units/kg/hr    Plan:  Bolus: None  Rate: Continue at 23 units/kg/hr  Next aPTT: 9/2/18 @ [place], PharmD, [facility]  9/1/2018  5:40 PM

## 2018-09-01 NOTE — PROGRESS NOTES
Progress Note  9/1/2018 10:46 AM  Subjective:   Admit Date: 8/30/2018  PCP: Faby Rocha MD    Interval History:     9.1.18  Patient seen at bedside this morning on behalf of Dr. Valorie Yang. He is s/p ORIF right calcaneus and closed reduction left pilon fracture with application of ex-fix and application of wound vac, left leg (DOS: 8.25.18). Pain is well controlled with scheduled pain medications. Patient has no complaints or questions at this time. Denies any N/V/D/F/C/SOB/CP. 8.31.18  Patient is seen on behalf of Dr. Valorie Yang. He is s/p ORIF right calcaneus and closed reduction left pilon fracture with application of ex-fix and application of wound vac, left leg (DOS: 8.25.18). He was transferred to [address] [place] [address] yesterday. He relates that he is working with PT/OT, and his pain is well controlled. No new complaints. 8.27.18   Patient seen at bedside this morning on behalf of Dr. Doy Bernheim. Patient is 2 days s/p ORIF right calcaneus and closed reduction left pilon fracture with application of ex-fix and application of wound vac, left leg (DOS: 8.25.18). Patient reports pain is well controlled with combination of Tramadol and Percocet. Patient and family express concern about discharge planning and would like to explore options for rehab/nursing facility. Patient states he worked with PT/OT yesterday on maneuvering in the bed and arm exercises with a resistance band. Patient denies F/V/N/C/SOB/CP.     8.26.18  Patient seen at bedside this morning on behalf of Dr. Valorie Yang. Patient is 1 day s/p ORIF right calcaneus and closed reduction left pilon fracture with application of ex-fix and application of wound vac, left leg. Patient reports that his pain is currently well controlled and he slept well overnight. Patient has not had BM since surgery, but is tolerating a diet and is passing flatus. Patient denies calf pain. Patient denies F/V/N/C/SOB/CP. Patient reports numbness and tingling in both feet.  No other complaints in the last 72 hours. BNP: No results for input(s): BNP in the last 72 hours. Lipids: No results for input(s): CHOL, HDL in the last 72 hours. Invalid input(s): LDLCALCU  INR:   No results for input(s): INR in the last 72 hours. Physical Exam:  General Appearance: alert and oriented to person, place and time, in no acute distress  Pulmonary/Chest: normal air movement, no respiratory distress  Abdomen: soft, non-tender, non-distended  Extremities: no cyanosis, clubbing or edema, pulse   Skin: warm and dry, no rash or erythema  Head: normocephalic and atraumatic  Eyes: pupils equal, round, and reactive to light  Neck: supple and non-tender without mass, no thyromegaly   Neurological: alert, oriented, normal speech, no focal findings or movement disorder noted     LOWER EXTREMITY:  Vascular: CFT brisk to exposed digits.     Dermatologic:   Right leg incision edges are well coapted. Sutures intact. No areas of dehiscence. No drainage. Wound vac intact to LLE, functioning properly.      Neurovascular: Light touch sensation grossly intact at the digits bilaterally. Patient able to dorsiflex and plantarflex toes, shannon.       Musculoskeletal: Muscle strength deferred. External fixator intact to LLE. Foot and ankle in rectus alignment, shannon.      Right posterior 8/31/18    Right plantar 8/31/18          Assessment and Plan:   Patient Active Problem List:     Anticoagulated on Coumadin     Fall     Displaced intraarticular fracture of right calcaneus, initial encounter for open fracture     Open left tibial fracture     Open fracture of distal end of left fibula     Accidental fall from ladder     ELLEN (acute kidney injury) ([facility] [address]    PLAN:     1. s/p ORIF right calcaneus and closed reduction left pilon fracture with application of ex-fix and application of wound vac, left leg.   -Continue with current pain regimen  -Continue with IV antibiotics, ID following  -Dressing left intact at this time, supplies ordered to

## 2018-09-02 LAB
APTT: 73.1 SECONDS (ref 22–38)
APTT: 74.8 SECONDS (ref 22–38)
ERYTHROCYTE [DISTWIDTH] IN BLOOD BY AUTOMATED COUNT: 12.7 % (ref 11.5–14.5)
ERYTHROCYTE [DISTWIDTH] IN BLOOD BY AUTOMATED COUNT: 43.1 FL (ref 35–45)
HCT VFR BLD CALC: 24.8 % (ref 42–52)
HEMOGLOBIN: 7.9 GM/DL (ref 14–18)
MCH RBC QN AUTO: 29.8 PG (ref 26–33)
MCHC RBC AUTO-ENTMCNC: 31.9 GM/DL (ref 32.2–35.5)
MCV RBC AUTO: 93.6 FL (ref 80–94)
PLATELET # BLD: 406 THOU/MM3 (ref 130–400)
PMV BLD AUTO: 9.2 FL (ref 9.4–12.4)
RBC # BLD: 2.65 MILL/MM3 (ref 4.7–6.1)
WBC # BLD: 6.3 THOU/MM3 (ref 4.8–10.8)

## 2018-09-02 PROCEDURE — 2580000003 HC RX 258: Performed by: SURGERY

## 2018-09-02 PROCEDURE — 6370000000 HC RX 637 (ALT 250 FOR IP): Performed by: INTERNAL MEDICINE

## 2018-09-02 PROCEDURE — 85027 COMPLETE CBC AUTOMATED: CPT

## 2018-09-02 PROCEDURE — 97542 WHEELCHAIR MNGMENT TRAINING: CPT

## 2018-09-02 PROCEDURE — 6370000000 HC RX 637 (ALT 250 FOR IP): Performed by: SURGERY

## 2018-09-02 PROCEDURE — 6370000000 HC RX 637 (ALT 250 FOR IP): Performed by: FAMILY MEDICINE

## 2018-09-02 PROCEDURE — 85730 THROMBOPLASTIN TIME PARTIAL: CPT

## 2018-09-02 PROCEDURE — 2709999900 HC NON-CHARGEABLE SUPPLY

## 2018-09-02 PROCEDURE — 1290000000 HC SEMI PRIVATE OTHER R&B

## 2018-09-02 PROCEDURE — 36415 COLL VENOUS BLD VENIPUNCTURE: CPT

## 2018-09-02 PROCEDURE — 97530 THERAPEUTIC ACTIVITIES: CPT

## 2018-09-02 PROCEDURE — 6360000002 HC RX W HCPCS: Performed by: COUNSELOR

## 2018-09-02 RX ADMIN — TIZANIDINE 4 MG: 4 TABLET ORAL at 04:14

## 2018-09-02 RX ADMIN — HYDROXYZINE PAMOATE 25 MG: 25 CAPSULE ORAL at 04:15

## 2018-09-02 RX ADMIN — TIZANIDINE 4 MG: 4 TABLET ORAL at 15:41

## 2018-09-02 RX ADMIN — FAMOTIDINE 20 MG: 20 TABLET ORAL at 21:11

## 2018-09-02 RX ADMIN — CEPHALEXIN 500 MG: 500 CAPSULE ORAL at 21:12

## 2018-09-02 RX ADMIN — POLYETHYLENE GLYCOL 3350 17 G: 17 POWDER, FOR SOLUTION ORAL at 08:18

## 2018-09-02 RX ADMIN — TIZANIDINE 4 MG: 4 TABLET ORAL at 21:12

## 2018-09-02 RX ADMIN — FAMOTIDINE 20 MG: 20 TABLET ORAL at 08:18

## 2018-09-02 RX ADMIN — CEPHALEXIN 500 MG: 500 CAPSULE ORAL at 06:12

## 2018-09-02 RX ADMIN — DOCUSATE SODIUM 100 MG: 100 CAPSULE, LIQUID FILLED ORAL at 14:49

## 2018-09-02 RX ADMIN — HYDROXYZINE PAMOATE 25 MG: 25 CAPSULE ORAL at 21:11

## 2018-09-02 RX ADMIN — HYDROCODONE BITARTRATE AND ACETAMINOPHEN 1 TABLET: 5; 325 TABLET ORAL at 06:12

## 2018-09-02 RX ADMIN — ATORVASTATIN CALCIUM 40 MG: 40 TABLET, FILM COATED ORAL at 08:18

## 2018-09-02 RX ADMIN — HYDROCODONE BITARTRATE AND ACETAMINOPHEN 1 TABLET: 5; 325 TABLET ORAL at 02:16

## 2018-09-02 RX ADMIN — HYDROCODONE BITARTRATE AND ACETAMINOPHEN 2 TABLET: 5; 325 TABLET ORAL at 10:29

## 2018-09-02 RX ADMIN — CEPHALEXIN 500 MG: 500 CAPSULE ORAL at 14:48

## 2018-09-02 RX ADMIN — HEPARIN SODIUM AND DEXTROSE 24 UNITS/KG/HR: 10000; 5 INJECTION INTRAVENOUS at 07:25

## 2018-09-02 RX ADMIN — NALOXEGOL OXALATE 12.5 MG: 12.5 TABLET, FILM COATED ORAL at 08:18

## 2018-09-02 RX ADMIN — SODIUM CHLORIDE: 9 INJECTION, SOLUTION INTRAVENOUS at 06:14

## 2018-09-02 RX ADMIN — DOCUSATE SODIUM 100 MG: 100 CAPSULE, LIQUID FILLED ORAL at 08:18

## 2018-09-02 RX ADMIN — HYDROCODONE BITARTRATE AND ACETAMINOPHEN 2 TABLET: 5; 325 TABLET ORAL at 18:47

## 2018-09-02 RX ADMIN — HYDROCODONE BITARTRATE AND ACETAMINOPHEN 2 TABLET: 5; 325 TABLET ORAL at 14:48

## 2018-09-02 RX ADMIN — HYDROCODONE BITARTRATE AND ACETAMINOPHEN 2 TABLET: 5; 325 TABLET ORAL at 22:34

## 2018-09-02 ASSESSMENT — PAIN SCALES - GENERAL
PAINLEVEL_OUTOF10: 0
PAINLEVEL_OUTOF10: 4
PAINLEVEL_OUTOF10: 0
PAINLEVEL_OUTOF10: 0
PAINLEVEL_OUTOF10: 8
PAINLEVEL_OUTOF10: 8
PAINLEVEL_OUTOF10: 5
PAINLEVEL_OUTOF10: 4
PAINLEVEL_OUTOF10: 7
PAINLEVEL_OUTOF10: 8

## 2018-09-02 ASSESSMENT — PAIN DESCRIPTION - LOCATION
LOCATION: FOOT;LEG

## 2018-09-02 ASSESSMENT — PAIN DESCRIPTION - ORIENTATION
ORIENTATION: RIGHT;LEFT

## 2018-09-02 ASSESSMENT — PAIN DESCRIPTION - PAIN TYPE
TYPE: ACUTE PAIN;SURGICAL PAIN

## 2018-09-02 ASSESSMENT — PAIN DESCRIPTION - DESCRIPTORS
DESCRIPTORS: ACHING;DISCOMFORT

## 2018-09-02 NOTE — PROGRESS NOTES
s/p ORIF right calcaneus and closed reduction left pilon fracture with application of ex-fix and application of wound vac, left leg.   -Continue with current pain regimen  -Continue with IV antibiotics, ID following  -Dressing to right leg changed consisting of betadine, DSD and splint reapplication. Left leg dressing/wound vac left intact.  -Continue strict bedrest, continue with PT/OT, upper extremity activities as tolerated   - Podiatry will continue following.     2. History of DVT   - labs reviewed  - Pharmacy to dose heparin    Dispo:   Patient to OR on 9/4/18 for revision of left Pilon Fx     Electronically signed by Diaz Champion DPM on 9/2/2018 at 7:18 AM      Tosin Mireles   Electronically signed by Tosin Mireles DPM on 9/4/2018 at 8:53 AM

## 2018-09-02 NOTE — H&P
TCU History and Physical      Chief Complaint and Reason for TCU Admission:   Need for more therapy time and fracture/wound care following the acute hospital stay    History of Present Illness:  Mali Malloy  is a 59 y.o. male admitted to the transitional care unit on 8/30/2018. He had injured himself after falling off a ladder and landing on his feet. He has gone to surgery and needs at least another for the repair of the fractures. He states a history of DVT times two to the right leg and is on chronic anticoagulation with coumadin for that. He needs to come to the TCU to work on the therapies and continue the heparin till the next surgery and then the coumadin can be restarted.     Past Medical History:      Diagnosis Date    Embolism - blood clot     rt calf and groin    Hyperlipidemia        Primary care provider: Liliane Angela MD     Past Surgical History:      Procedure Laterality Date    HIP SURGERY      lt    [place] OFFICE/OUTPT VISIT,PROCEDURE ONLY Right 8/25/2018    OPEN REDUCTION INTERNAL FIXATION OF PILON FRACTURE WITH EXTERNAL FIXATOR AND WOUND VAC PLACEMENT, RIGHT CALCANEOUS OPEN REDUCTION INTERNAL FIXATION performed by Andrew Lomax DPM at [address] [facility]:    Morphine    Current Medications:    Current Facility-Administered Medications: hydrOXYzine (VISTARIL) capsule 25 mg, 25 mg, Oral, 4x Daily PRN  HYDROcodone-acetaminophen (NORCO) 5-325 MG per tablet 1 tablet, 1 tablet, Oral, Q4H PRN **OR** HYDROcodone-acetaminophen (NORCO) 5-325 MG per tablet 2 tablet, 2 tablet, Oral, Q4H PRN  heparin 25,000 units in dextrose 5% 250 mL infusion, 23 Units/kg/hr, Intravenous, Continuous  sodium chloride flush 0.9 % injection 10 mL, 10 mL, Intravenous, 2 times per day  sodium chloride flush 0.9 % injection 10 mL, 10 mL, Intravenous, PRN  acetaminophen (TYLENOL) tablet 650 mg, 650 mg, Oral, Q4H PRN  famotidine (PEPCID) tablet 20 mg, 20 mg, Oral, BID  0.9 % sodium chloride infusion, , Intravenous, Continuous  docusate sodium (COLACE) capsule 100 mg, 100 mg, Oral, TID  atorvastatin (LIPITOR) tablet 40 mg, 40 mg, Oral, Daily  naloxegol (MOVANTIK) tablet 12.5 mg, 12.5 mg, Oral, QAM  polyethylene glycol (GLYCOLAX) packet 17 g, 17 g, Oral, BID  [START ON 9/2/2018] ppd (tuberculin skin test) read, , Does not apply, Weekly  senna (SENOKOT) tablet 34.4 mg, 4 tablet, Oral, Nightly  tuberculin injection 5 Units, 5 Units, Intradermal, Weekly  cephALEXin (KEFLEX) capsule 500 mg, 500 mg, Oral, 3 times per day  tiZANidine (ZANAFLEX) tablet 4 mg, 4 mg, Oral, Q6H PRN     Resident is taking an antipsychotic medication? Yes, vistoril     If yes, was Gradual Dose Reduction (GDR) attempted? No     No- GDR is clinically contraindicated due to the fact that tapering the medication  would not achieve the desired therapeutic effects and the current dose is  necessary to maintain or improve the resident's function, well-being, safety, and  quality of life. Social History:  Social History     Social History    Marital status:      Spouse name: N/A    Number of children: N/A    Years of education: N/A     Occupational History    Not on file. Social History Main Topics    Smoking status: Never Smoker    Smokeless tobacco: Never Used    Alcohol use No    Drug use: No    Sexual activity: Not on file     Other Topics Concern    Not on file     Social History Narrative    No narrative on file           Family History:   No family history on file.     Review of Systems:  CONSTITUTIONAL:  positive for  fatigue  EYES:  positive for  glasses  HEENT:  positive for  nasal congestion  RESPIRATORY:  negative for  dyspnea  CARDIOVASCULAR:  negative for  chest pain  GASTROINTESTINAL:  positive for constipation  GENITOURINARY:  negative for dysuria  SKIN:  negative  HEMATOLOGIC/LYMPHATIC:  negative for easy bruising  MUSCULOSKELETAL:  positive for  myalgias, arthralgias, pain, stiff joints, decreased range of motion, muscle

## 2018-09-03 LAB
ABO: NORMAL
ANTIBODY SCREEN: NORMAL
APTT: 92.3 SECONDS (ref 22–38)
APTT: 92.9 SECONDS (ref 22–38)
HCT VFR BLD CALC: 26.3 % (ref 42–52)
HEMOGLOBIN: 8.4 GM/DL (ref 14–18)
RH FACTOR: NORMAL

## 2018-09-03 PROCEDURE — 86850 RBC ANTIBODY SCREEN: CPT

## 2018-09-03 PROCEDURE — 36415 COLL VENOUS BLD VENIPUNCTURE: CPT

## 2018-09-03 PROCEDURE — 2709999900 HC NON-CHARGEABLE SUPPLY

## 2018-09-03 PROCEDURE — 6360000002 HC RX W HCPCS: Performed by: COUNSELOR

## 2018-09-03 PROCEDURE — 86900 BLOOD TYPING SEROLOGIC ABO: CPT

## 2018-09-03 PROCEDURE — 85730 THROMBOPLASTIN TIME PARTIAL: CPT

## 2018-09-03 PROCEDURE — 85018 HEMOGLOBIN: CPT

## 2018-09-03 PROCEDURE — 85014 HEMATOCRIT: CPT

## 2018-09-03 PROCEDURE — 1290000000 HC SEMI PRIVATE OTHER R&B

## 2018-09-03 PROCEDURE — 6370000000 HC RX 637 (ALT 250 FOR IP): Performed by: FAMILY MEDICINE

## 2018-09-03 PROCEDURE — 6370000000 HC RX 637 (ALT 250 FOR IP): Performed by: INTERNAL MEDICINE

## 2018-09-03 PROCEDURE — 6370000000 HC RX 637 (ALT 250 FOR IP): Performed by: SURGERY

## 2018-09-03 PROCEDURE — 6360000002 HC RX W HCPCS: Performed by: STUDENT IN AN ORGANIZED HEALTH CARE EDUCATION/TRAINING PROGRAM

## 2018-09-03 PROCEDURE — 86901 BLOOD TYPING SEROLOGIC RH(D): CPT

## 2018-09-03 RX ADMIN — HYDROXYZINE PAMOATE 25 MG: 25 CAPSULE ORAL at 03:35

## 2018-09-03 RX ADMIN — TIZANIDINE 4 MG: 4 TABLET ORAL at 03:35

## 2018-09-03 RX ADMIN — HYDROXYZINE PAMOATE 25 MG: 25 CAPSULE ORAL at 17:05

## 2018-09-03 RX ADMIN — HEPARIN SODIUM AND DEXTROSE 24 UNITS/KG/HR: 10000; 5 INJECTION INTRAVENOUS at 01:24

## 2018-09-03 RX ADMIN — DOCUSATE SODIUM 100 MG: 100 CAPSULE, LIQUID FILLED ORAL at 08:53

## 2018-09-03 RX ADMIN — FAMOTIDINE 20 MG: 20 TABLET ORAL at 21:53

## 2018-09-03 RX ADMIN — POLYETHYLENE GLYCOL 3350 17 G: 17 POWDER, FOR SOLUTION ORAL at 08:53

## 2018-09-03 RX ADMIN — CEPHALEXIN 500 MG: 500 CAPSULE ORAL at 05:07

## 2018-09-03 RX ADMIN — NALOXEGOL OXALATE 12.5 MG: 12.5 TABLET, FILM COATED ORAL at 08:53

## 2018-09-03 RX ADMIN — HEPARIN SODIUM AND DEXTROSE 24 UNITS/KG/HR: 10000; 5 INJECTION INTRAVENOUS at 15:42

## 2018-09-03 RX ADMIN — HYDROXYZINE PAMOATE 25 MG: 25 CAPSULE ORAL at 23:28

## 2018-09-03 RX ADMIN — TIZANIDINE 4 MG: 4 TABLET ORAL at 20:24

## 2018-09-03 RX ADMIN — HYDROCODONE BITARTRATE AND ACETAMINOPHEN 2 TABLET: 5; 325 TABLET ORAL at 21:53

## 2018-09-03 RX ADMIN — HYDROCODONE BITARTRATE AND ACETAMINOPHEN 2 TABLET: 5; 325 TABLET ORAL at 17:05

## 2018-09-03 RX ADMIN — ATORVASTATIN CALCIUM 40 MG: 40 TABLET, FILM COATED ORAL at 08:53

## 2018-09-03 RX ADMIN — HYDROCODONE BITARTRATE AND ACETAMINOPHEN 2 TABLET: 5; 325 TABLET ORAL at 06:37

## 2018-09-03 RX ADMIN — FAMOTIDINE 20 MG: 20 TABLET ORAL at 08:53

## 2018-09-03 RX ADMIN — HYDROCODONE BITARTRATE AND ACETAMINOPHEN 2 TABLET: 5; 325 TABLET ORAL at 12:36

## 2018-09-03 RX ADMIN — CEPHALEXIN 500 MG: 500 CAPSULE ORAL at 13:46

## 2018-09-03 RX ADMIN — HYDROCODONE BITARTRATE AND ACETAMINOPHEN 2 TABLET: 5; 325 TABLET ORAL at 02:35

## 2018-09-03 RX ADMIN — TIZANIDINE 4 MG: 4 TABLET ORAL at 13:46

## 2018-09-03 RX ADMIN — DOCUSATE SODIUM 100 MG: 100 CAPSULE, LIQUID FILLED ORAL at 13:46

## 2018-09-03 RX ADMIN — CEPHALEXIN 500 MG: 500 CAPSULE ORAL at 21:53

## 2018-09-03 ASSESSMENT — PAIN SCALES - GENERAL
PAINLEVEL_OUTOF10: 7
PAINLEVEL_OUTOF10: 8
PAINLEVEL_OUTOF10: 0
PAINLEVEL_OUTOF10: 8
PAINLEVEL_OUTOF10: 0
PAINLEVEL_OUTOF10: 0
PAINLEVEL_OUTOF10: 8
PAINLEVEL_OUTOF10: 8

## 2018-09-03 ASSESSMENT — PAIN DESCRIPTION - LOCATION
LOCATION: FOOT;LEG

## 2018-09-03 ASSESSMENT — PAIN DESCRIPTION - PAIN TYPE
TYPE: ACUTE PAIN
TYPE: ACUTE PAIN;SURGICAL PAIN
TYPE: ACUTE PAIN;SURGICAL PAIN

## 2018-09-03 ASSESSMENT — PAIN DESCRIPTION - ORIENTATION
ORIENTATION: RIGHT;LEFT

## 2018-09-03 ASSESSMENT — PAIN DESCRIPTION - DESCRIPTORS
DESCRIPTORS: ACHING;DISCOMFORT
DESCRIPTORS: ACHING;DISCOMFORT
DESCRIPTORS: ACHING

## 2018-09-03 NOTE — PROGRESS NOTES
resistance band. Patient denies F/V/N/C/SOB/CP.     8.26.18  Patient seen at bedside this morning on behalf of Dr. Boris Jackson. Patient is 1 day s/p ORIF right calcaneus and closed reduction left pilon fracture with application of ex-fix and application of wound vac, left leg. Patient reports that his pain is currently well controlled and he slept well overnight. Patient has not had BM since surgery, but is tolerating a diet and is passing flatus. Patient denies calf pain. Patient denies F/V/N/C/SOB/CP. Patient reports numbness and tingling in both feet. No other complaints at this time. HISTORY OF PRESENT ILLNESS:    The patient is a 59 y.o. male with significant past medical history of DVT who presents with bilateral open fractures. Patient states that he was climbing a ladder around 9pm this evening and fell from a height of 10-12 feet. Patient states that he fell directly on his feet onto concrete. Patient denies any other trauma, patient denies LOC. Patient was alone at the time of the injury and yelled for help, patient states a neighbor came to his aid within a few minutes. Patient was then treated by EMS and brought to the ED. Patient reports pain is a 10/10 with pain worse in the left leg.     Diet: DIET GENERAL;  Dietary Nutrition Supplements: Wound Healing Oral Supplement  Medications:   Scheduled Meds:   sodium chloride flush  10 mL Intravenous 2 times per day    famotidine  20 mg Oral BID    docusate sodium  100 mg Oral TID    atorvastatin  40 mg Oral Daily    naloxegol  12.5 mg Oral QAM    polyethylene glycol  17 g Oral BID    ppd   Does not apply Weekly    senna  4 tablet Oral Nightly    tuberculin  5 Units Intradermal Weekly    cephALEXin  500 mg Oral 3 times per day     Continuous Infusions:   heparin (porcine) 24 Units/kg/hr (09/03/18 0124)    sodium chloride 20 mL/hr at 09/02/18 0614     PRN Medications: hydrOXYzine, HYDROcodone 5 mg - acetaminophen **OR** HYDROcodone 5 mg - fixator intact to LLE. Foot and ankle in rectus alignment, shannon.     9.2.18: Assessment and Plan:   Patient Active Problem List:     Anticoagulated on Coumadin     Fall     Displaced intraarticular fracture of right calcaneus, initial encounter for open fracture     Open left tibial fracture     Open fracture of distal end of left fibula     Accidental fall from ladder     ELLEN (acute kidney injury) ([facility] [address]    PLAN:     1. s/p ORIF right calcaneus and closed reduction left pilon fracture with application of ex-fix and application of wound vac, left leg.   -Continue with current pain regimen  -Continue with IV antibiotics, ID following  -Dressing to right leg left in place. Left leg dressing/wound vac left intact.  -Continue strict bedrest, continue with PT/OT, upper extremity activities as tolerated   - Podiatry will continue following.     2. History of DVT   - labs reviewed  - Pharmacy to dose heparin    Dispo:   Patient to OR on 9/4/18 for revision of left Pilon Fx     Electronically signed by Derrick Abraham DPM on 9/3/2018 at 9:42 AM      Deon Concepcion   Electronically signed by Deon Concepcion DPM on 9/4/2018 at 8:54 AM

## 2018-09-03 NOTE — PROGRESS NOTES
Heparin Consult  Lab Results   Component Value Date    APTT 92.3 09/03/2018     Lab Results   Component Value Date    HGB 8.4 09/03/2018    HCT 26.3 09/03/2018     09/02/2018    INR 1.18 08/26/2018       Current Rate: 24 units/kg/hr    Plan:  Bolus: none  Rate: Continue 24 units/kg/hr  Next aPTT: 9/3/18 @ [facility], PharmEVANS  9/3/2018  8:10 AM

## 2018-09-03 NOTE — FLOWSHEET NOTE
09/02/18 2120   Encounter Summary   Services provided to: Patient   Referral/Consult From: [address] Completed   Continue Visiting Yes  (9/2/18)   Volunteer Visit Yes   Complexity of Encounter Moderate   Length of Encounter 15 minutes   Spiritual Assessment Completed Yes   Spiritual/Buddhism   Type Spiritual support   Assessment Calm; Approachable; Hopeful   Intervention Active listening;Nurtured hope;Prayer   Outcome Engaged in conversation;Expressed gratitude   9/2/18  S. Patient was reading when this staff entered the room. He welcomed this staff. O. Patient has two broken legs. Anticipating surgery on Tuesday. A. We discussed his situation since he is the  of a [employer] and this condition will make some changes in his schedule. Prayed with him. P. Recommend spiritual care follow up.

## 2018-09-03 NOTE — PROGRESS NOTES
Heparin Consult  Lab Results   Component Value Date    APTT 92.9 09/03/2018     Lab Results   Component Value Date    HGB 8.4 09/03/2018    HCT 26.3 09/03/2018     09/02/2018    INR 1.18 08/26/2018       Current Rate: 24 units/kg/hr    Plan:  Bolus: None  Rate: Continue at 24 units/kg/hr.  Current order scheduled to be stopped/held on 9/4/18 at 0900 4-hours before surgery  Next aPTT: 9[facility]4/18 [facility] Velma Teixeira, PharmD, [facility]  9/3/2018  6:21 PM

## 2018-09-04 ENCOUNTER — ANESTHESIA (OUTPATIENT)
Dept: OPERATING ROOM | Age: 63
DRG: 463 | End: 2018-09-04
Payer: COMMERCIAL

## 2018-09-04 ENCOUNTER — HOSPITAL ENCOUNTER (INPATIENT)
Age: 63
LOS: 4 days | Discharge: HOME HEALTH | DRG: 463 | End: 2018-09-08
Attending: PODIATRIST | Admitting: PODIATRIST
Payer: COMMERCIAL

## 2018-09-04 ENCOUNTER — ANESTHESIA EVENT (OUTPATIENT)
Dept: OPERATING ROOM | Age: 63
DRG: 463 | End: 2018-09-04
Payer: COMMERCIAL

## 2018-09-04 ENCOUNTER — APPOINTMENT (OUTPATIENT)
Dept: GENERAL RADIOLOGY | Age: 63
DRG: 463 | End: 2018-09-04
Attending: PODIATRIST
Payer: COMMERCIAL

## 2018-09-04 VITALS
SYSTOLIC BLOOD PRESSURE: 128 MMHG | OXYGEN SATURATION: 100 % | RESPIRATION RATE: 19 BRPM | DIASTOLIC BLOOD PRESSURE: 67 MMHG

## 2018-09-04 VITALS
OXYGEN SATURATION: 99 % | WEIGHT: 166.5 LBS | TEMPERATURE: 98.3 F | SYSTOLIC BLOOD PRESSURE: 116 MMHG | BODY MASS INDEX: 28.42 KG/M2 | RESPIRATION RATE: 18 BRPM | DIASTOLIC BLOOD PRESSURE: 57 MMHG | HEART RATE: 66 BPM | HEIGHT: 64 IN

## 2018-09-04 DIAGNOSIS — S92.061B: Primary | ICD-10-CM

## 2018-09-04 DIAGNOSIS — S82.252B TYPE I OR II OPEN DISPLACED COMMINUTED FRACTURE OF SHAFT OF LEFT TIBIA, INITIAL ENCOUNTER: ICD-10-CM

## 2018-09-04 PROBLEM — R52 INADEQUATE PAIN CONTROL: Status: ACTIVE | Noted: 2018-09-04

## 2018-09-04 LAB — APTT: 102 SECONDS (ref 22–38)

## 2018-09-04 PROCEDURE — 85730 THROMBOPLASTIN TIME PARTIAL: CPT

## 2018-09-04 PROCEDURE — 3600000004 HC SURGERY LEVEL 4 BASE: Performed by: PODIATRIST

## 2018-09-04 PROCEDURE — 2500000003 HC RX 250 WO HCPCS: Performed by: NURSE ANESTHETIST, CERTIFIED REGISTERED

## 2018-09-04 PROCEDURE — 3600000014 HC SURGERY LEVEL 4 ADDTL 15MIN: Performed by: PODIATRIST

## 2018-09-04 PROCEDURE — 2709999900 HC NON-CHARGEABLE SUPPLY

## 2018-09-04 PROCEDURE — 6360000002 HC RX W HCPCS: Performed by: ANESTHESIOLOGY

## 2018-09-04 PROCEDURE — 3E0T3BZ INTRODUCTION OF ANESTHETIC AGENT INTO PERIPHERAL NERVES AND PLEXI, PERCUTANEOUS APPROACH: ICD-10-PCS | Performed by: ANESTHESIOLOGY

## 2018-09-04 PROCEDURE — 2720000003 HC MISC SUTURE/STAPLES/RELOADS/ETC: Performed by: PODIATRIST

## 2018-09-04 PROCEDURE — 6360000002 HC RX W HCPCS: Performed by: NURSE ANESTHETIST, CERTIFIED REGISTERED

## 2018-09-04 PROCEDURE — 6370000000 HC RX 637 (ALT 250 FOR IP): Performed by: STUDENT IN AN ORGANIZED HEALTH CARE EDUCATION/TRAINING PROGRAM

## 2018-09-04 PROCEDURE — 2580000003 HC RX 258: Performed by: NURSE ANESTHETIST, CERTIFIED REGISTERED

## 2018-09-04 PROCEDURE — 6360000002 HC RX W HCPCS: Performed by: INTERNAL MEDICINE

## 2018-09-04 PROCEDURE — C9359 IMPLNT,BON VOID FILLER-PUTTY: HCPCS | Performed by: PODIATRIST

## 2018-09-04 PROCEDURE — 2720000010 HC SURG SUPPLY STERILE: Performed by: PODIATRIST

## 2018-09-04 PROCEDURE — 0QPHX5Z REMOVAL OF EXTERNAL FIXATION DEVICE FROM LEFT TIBIA, EXTERNAL APPROACH: ICD-10-PCS | Performed by: PODIATRIST

## 2018-09-04 PROCEDURE — 7100000001 HC PACU RECOVERY - ADDTL 15 MIN: Performed by: PODIATRIST

## 2018-09-04 PROCEDURE — 73600 X-RAY EXAM OF ANKLE: CPT

## 2018-09-04 PROCEDURE — C1713 ANCHOR/SCREW BN/BN,TIS/BN: HCPCS | Performed by: PODIATRIST

## 2018-09-04 PROCEDURE — 6370000000 HC RX 637 (ALT 250 FOR IP): Performed by: INTERNAL MEDICINE

## 2018-09-04 PROCEDURE — 0QUH07Z SUPPLEMENT LEFT TIBIA WITH AUTOLOGOUS TISSUE SUBSTITUTE, OPEN APPROACH: ICD-10-PCS | Performed by: PODIATRIST

## 2018-09-04 PROCEDURE — 6370000000 HC RX 637 (ALT 250 FOR IP): Performed by: FAMILY MEDICINE

## 2018-09-04 PROCEDURE — 0HRLXK4 REPLACEMENT OF LEFT LOWER LEG SKIN WITH NONAUTOLOGOUS TISSUE SUBSTITUTE, PARTIAL THICKNESS, EXTERNAL APPROACH: ICD-10-PCS | Performed by: PODIATRIST

## 2018-09-04 PROCEDURE — 0QSK04Z REPOSITION LEFT FIBULA WITH INTERNAL FIXATION DEVICE, OPEN APPROACH: ICD-10-PCS | Performed by: PODIATRIST

## 2018-09-04 PROCEDURE — 7100000000 HC PACU RECOVERY - FIRST 15 MIN: Performed by: PODIATRIST

## 2018-09-04 PROCEDURE — 36415 COLL VENOUS BLD VENIPUNCTURE: CPT

## 2018-09-04 PROCEDURE — 64447 NJX AA&/STRD FEMORAL NRV IMG: CPT | Performed by: ANESTHESIOLOGY

## 2018-09-04 PROCEDURE — 1200000000 HC SEMI PRIVATE

## 2018-09-04 PROCEDURE — 99253 IP/OBS CNSLTJ NEW/EST LOW 45: CPT | Performed by: INTERNAL MEDICINE

## 2018-09-04 PROCEDURE — 2709999900 HC NON-CHARGEABLE SUPPLY: Performed by: PODIATRIST

## 2018-09-04 PROCEDURE — 0QSH04Z REPOSITION LEFT TIBIA WITH INTERNAL FIXATION DEVICE, OPEN APPROACH: ICD-10-PCS | Performed by: PODIATRIST

## 2018-09-04 PROCEDURE — 3700000001 HC ADD 15 MINUTES (ANESTHESIA): Performed by: PODIATRIST

## 2018-09-04 PROCEDURE — 3700000000 HC ANESTHESIA ATTENDED CARE: Performed by: PODIATRIST

## 2018-09-04 PROCEDURE — 6360000002 HC RX W HCPCS: Performed by: STUDENT IN AN ORGANIZED HEALTH CARE EDUCATION/TRAINING PROGRAM

## 2018-09-04 PROCEDURE — 64445 NJX AA&/STRD SCIATIC NRV IMG: CPT | Performed by: ANESTHESIOLOGY

## 2018-09-04 PROCEDURE — 2580000003 HC RX 258: Performed by: SURGERY

## 2018-09-04 PROCEDURE — 3209999900 FLUORO FOR SURGICAL PROCEDURES

## 2018-09-04 DEVICE — EVOS 3.5MM X 14MM LOCKING SCREW SELF-TAPPING
Type: IMPLANTABLE DEVICE | Status: FUNCTIONAL
Brand: EVOS

## 2018-09-04 DEVICE — C BONE PUTTY WITH DBM AND CANCELLOUS BONE CHIPS
Type: IMPLANTABLE DEVICE | Status: FUNCTIONAL
Brand: ALLOMATRIX

## 2018-09-04 DEVICE — EVOS 3.5MM X 16MM CORTEX SCREW SELF-TAPPING
Type: IMPLANTABLE DEVICE | Status: FUNCTIONAL
Brand: EVOS

## 2018-09-04 DEVICE — INTEGRA® BILAYER MATRIX WOUND DRESSING 4 IN*10 IN (10 CM*25 CM)
Type: IMPLANTABLE DEVICE | Status: FUNCTIONAL
Brand: INTEGRA®

## 2018-09-04 DEVICE — EVOS 3.5MM X 36MM LOCKING SCREW SELF-TAPPING
Type: IMPLANTABLE DEVICE | Status: FUNCTIONAL
Brand: EVOS

## 2018-09-04 DEVICE — EVOS 2.7MM X 34MM CORTEX SCREW T8 SELF-TAPPING
Type: IMPLANTABLE DEVICE | Status: FUNCTIONAL
Brand: EVOS

## 2018-09-04 DEVICE — EVOS 2.7MM X 16MM LOCKING SCREW T8 SELF-TAPPING
Type: IMPLANTABLE DEVICE | Status: FUNCTIONAL
Brand: EVOS

## 2018-09-04 DEVICE — EVOS 3.5MM POSTERIOR DISTAL TIBIA                                    PLATE 6 HOLE LEFT 98MM
Type: IMPLANTABLE DEVICE | Status: FUNCTIONAL
Brand: EVOS

## 2018-09-04 DEVICE — EVOS 3.5MM X 16MM LOCKING SCREW SELF-TAPPING
Type: IMPLANTABLE DEVICE | Status: FUNCTIONAL
Brand: EVOS

## 2018-09-04 DEVICE — EVOS 2.7MM/3.5MM ANTEROLATERAL                                    DISTAL TIBIA PLATE 8 HOLE LEFT 120MM
Type: IMPLANTABLE DEVICE | Status: FUNCTIONAL
Brand: EVOS

## 2018-09-04 DEVICE — EVOS 2.7MM X 40MM LOCKING SCREW T8 SELF-TAPPING
Type: IMPLANTABLE DEVICE | Status: FUNCTIONAL
Brand: EVOS

## 2018-09-04 DEVICE — EVOS 3.5MM X 26MM CORTEX SCREW SELF-TAPPING
Type: IMPLANTABLE DEVICE | Status: FUNCTIONAL
Brand: EVOS

## 2018-09-04 DEVICE — EVOS 2.7MM FLEX PLATE 8 HOLE
Type: IMPLANTABLE DEVICE | Status: FUNCTIONAL
Brand: EVOS

## 2018-09-04 DEVICE — EVOS 3.5MM X 30MM LOCKING SCREW SELF-TAPPING
Type: IMPLANTABLE DEVICE | Status: FUNCTIONAL
Brand: EVOS

## 2018-09-04 DEVICE — EVOS 3.5MM X 32MM LOCKING SCREW SELF-TAPPING
Type: IMPLANTABLE DEVICE | Status: FUNCTIONAL
Brand: EVOS

## 2018-09-04 DEVICE — EVOS 2.7MM X 30MM LOCKING SCREW T8 SELF-TAPPING
Type: IMPLANTABLE DEVICE | Status: FUNCTIONAL
Brand: EVOS

## 2018-09-04 DEVICE — EVOS 2.7MM X 20MM LOCKING SCREW T8 SELF-TAPPING
Type: IMPLANTABLE DEVICE | Status: FUNCTIONAL
Brand: EVOS

## 2018-09-04 DEVICE — WASHER FOR 2.7MM SCREWS
Type: IMPLANTABLE DEVICE | Status: FUNCTIONAL
Brand: EVOS

## 2018-09-04 DEVICE — EVOS 3.5MM X 13MM LOCKING SCREW SELF-TAPPING
Type: IMPLANTABLE DEVICE | Status: FUNCTIONAL
Brand: EVOS

## 2018-09-04 DEVICE — EVOS 3.5MM X 14MM CORTEX SCREW SELF-TAPPING
Type: IMPLANTABLE DEVICE | Status: FUNCTIONAL
Brand: EVOS

## 2018-09-04 DEVICE — PERI-LOC VLP 2.7MM X 14MM                                    PROVISIONAL FIXATION PIN
Type: IMPLANTABLE DEVICE | Status: FUNCTIONAL
Brand: PERI-LOC VLP

## 2018-09-04 DEVICE — EVOS 3.5MM X 40MM LOCKING SCREW SELF-TAPPING
Type: IMPLANTABLE DEVICE | Status: FUNCTIONAL
Brand: EVOS

## 2018-09-04 DEVICE — EVOS 2.7MM X 32MM CORTEX SCREW T8 SELF-TAPPING
Type: IMPLANTABLE DEVICE | Status: FUNCTIONAL
Brand: EVOS

## 2018-09-04 DEVICE — EVOS 3.5MM X 26MM LOCKING SCREW SELF-TAPPING
Type: IMPLANTABLE DEVICE | Status: FUNCTIONAL
Brand: EVOS

## 2018-09-04 DEVICE — EVOS 2.7MM X 34MM LOCKING SCREW T8 SELF-TAPPING
Type: IMPLANTABLE DEVICE | Status: FUNCTIONAL
Brand: EVOS

## 2018-09-04 DEVICE — EVOS 3.5MM LOCKING 1/3 TUBULAR                                    PLATE 8 HOLE 94MM
Type: IMPLANTABLE DEVICE | Status: FUNCTIONAL
Brand: EVOS

## 2018-09-04 RX ORDER — ACETAMINOPHEN 325 MG/1
650 TABLET ORAL EVERY 4 HOURS PRN
Status: CANCELLED | OUTPATIENT
Start: 2018-09-04

## 2018-09-04 RX ORDER — HYDROXYZINE PAMOATE 25 MG/1
25 CAPSULE ORAL 4 TIMES DAILY PRN
Status: CANCELLED | OUTPATIENT
Start: 2018-09-04

## 2018-09-04 RX ORDER — FENTANYL CITRATE 50 UG/ML
INJECTION, SOLUTION INTRAMUSCULAR; INTRAVENOUS PRN
Status: DISCONTINUED | OUTPATIENT
Start: 2018-09-04 | End: 2018-09-04 | Stop reason: SDUPTHER

## 2018-09-04 RX ORDER — SODIUM CHLORIDE 0.9 % (FLUSH) 0.9 %
10 SYRINGE (ML) INJECTION PRN
Status: CANCELLED | OUTPATIENT
Start: 2018-09-04

## 2018-09-04 RX ORDER — DIPHENHYDRAMINE HYDROCHLORIDE 50 MG/ML
12.5 INJECTION INTRAMUSCULAR; INTRAVENOUS
Status: DISCONTINUED | OUTPATIENT
Start: 2018-09-04 | End: 2018-09-04 | Stop reason: HOSPADM

## 2018-09-04 RX ORDER — OXYCODONE HYDROCHLORIDE AND ACETAMINOPHEN 5; 325 MG/1; MG/1
1 TABLET ORAL EVERY 4 HOURS PRN
Status: DISCONTINUED | OUTPATIENT
Start: 2018-09-04 | End: 2018-09-05

## 2018-09-04 RX ORDER — ONDANSETRON 2 MG/ML
4 INJECTION INTRAMUSCULAR; INTRAVENOUS
Status: DISCONTINUED | OUTPATIENT
Start: 2018-09-04 | End: 2018-09-04 | Stop reason: HOSPADM

## 2018-09-04 RX ORDER — ACETAMINOPHEN 325 MG/1
650 TABLET ORAL EVERY 4 HOURS PRN
Status: DISCONTINUED | OUTPATIENT
Start: 2018-09-04 | End: 2018-09-08 | Stop reason: HOSPADM

## 2018-09-04 RX ORDER — VECURONIUM BROMIDE 1 MG/ML
INJECTION, POWDER, LYOPHILIZED, FOR SOLUTION INTRAVENOUS PRN
Status: DISCONTINUED | OUTPATIENT
Start: 2018-09-04 | End: 2018-09-04 | Stop reason: SDUPTHER

## 2018-09-04 RX ORDER — ONDANSETRON 2 MG/ML
4 INJECTION INTRAMUSCULAR; INTRAVENOUS EVERY 6 HOURS PRN
Status: DISCONTINUED | OUTPATIENT
Start: 2018-09-04 | End: 2018-09-08 | Stop reason: HOSPADM

## 2018-09-04 RX ORDER — HYDROCODONE BITARTRATE AND ACETAMINOPHEN 5; 325 MG/1; MG/1
2 TABLET ORAL EVERY 4 HOURS PRN
Status: CANCELLED | OUTPATIENT
Start: 2018-09-04

## 2018-09-04 RX ORDER — SODIUM CHLORIDE 0.9 % (FLUSH) 0.9 %
10 SYRINGE (ML) INJECTION EVERY 12 HOURS SCHEDULED
Status: DISCONTINUED | OUTPATIENT
Start: 2018-09-04 | End: 2018-09-08 | Stop reason: HOSPADM

## 2018-09-04 RX ORDER — DOCUSATE SODIUM 100 MG/1
100 CAPSULE, LIQUID FILLED ORAL 3 TIMES DAILY
Status: CANCELLED | OUTPATIENT
Start: 2018-09-04

## 2018-09-04 RX ORDER — CEPHALEXIN 500 MG/1
500 CAPSULE ORAL EVERY 8 HOURS SCHEDULED
Status: CANCELLED | OUTPATIENT
Start: 2018-09-04 | End: 2018-09-06

## 2018-09-04 RX ORDER — SODIUM CHLORIDE 9 MG/ML
INJECTION, SOLUTION INTRAVENOUS CONTINUOUS PRN
Status: DISCONTINUED | OUTPATIENT
Start: 2018-09-04 | End: 2018-09-04 | Stop reason: SDUPTHER

## 2018-09-04 RX ORDER — POLYETHYLENE GLYCOL 3350 17 G/17G
17 POWDER, FOR SOLUTION ORAL 2 TIMES DAILY
Status: CANCELLED | OUTPATIENT
Start: 2018-09-04

## 2018-09-04 RX ORDER — HYDROMORPHONE HCL 110MG/55ML
PATIENT CONTROLLED ANALGESIA SYRINGE INTRAVENOUS PRN
Status: DISCONTINUED | OUTPATIENT
Start: 2018-09-04 | End: 2018-09-04 | Stop reason: SDUPTHER

## 2018-09-04 RX ORDER — LABETALOL HYDROCHLORIDE 5 MG/ML
5 INJECTION, SOLUTION INTRAVENOUS EVERY 5 MIN PRN
Status: DISCONTINUED | OUTPATIENT
Start: 2018-09-04 | End: 2018-09-04 | Stop reason: HOSPADM

## 2018-09-04 RX ORDER — TIZANIDINE 4 MG/1
4 TABLET ORAL EVERY 6 HOURS PRN
Status: CANCELLED | OUTPATIENT
Start: 2018-09-04

## 2018-09-04 RX ORDER — FENTANYL CITRATE 50 UG/ML
50 INJECTION, SOLUTION INTRAMUSCULAR; INTRAVENOUS EVERY 5 MIN PRN
Status: DISCONTINUED | OUTPATIENT
Start: 2018-09-04 | End: 2018-09-04 | Stop reason: HOSPADM

## 2018-09-04 RX ORDER — TIZANIDINE 4 MG/1
4 TABLET ORAL EVERY 6 HOURS PRN
Status: DISCONTINUED | OUTPATIENT
Start: 2018-09-04 | End: 2018-09-08 | Stop reason: HOSPADM

## 2018-09-04 RX ORDER — HYDROCODONE BITARTRATE AND ACETAMINOPHEN 5; 325 MG/1; MG/1
1 TABLET ORAL EVERY 4 HOURS PRN
Status: CANCELLED | OUTPATIENT
Start: 2018-09-04

## 2018-09-04 RX ORDER — OXYCODONE HYDROCHLORIDE AND ACETAMINOPHEN 5; 325 MG/1; MG/1
2 TABLET ORAL EVERY 4 HOURS PRN
Status: DISCONTINUED | OUTPATIENT
Start: 2018-09-04 | End: 2018-09-05

## 2018-09-04 RX ORDER — SODIUM CHLORIDE 0.9 % (FLUSH) 0.9 %
10 SYRINGE (ML) INJECTION PRN
Status: DISCONTINUED | OUTPATIENT
Start: 2018-09-04 | End: 2018-09-08 | Stop reason: HOSPADM

## 2018-09-04 RX ORDER — ROPIVACAINE HYDROCHLORIDE 5 MG/ML
INJECTION, SOLUTION EPIDURAL; INFILTRATION; PERINEURAL PRN
Status: DISCONTINUED | OUTPATIENT
Start: 2018-09-04 | End: 2018-09-04 | Stop reason: SDUPTHER

## 2018-09-04 RX ORDER — SODIUM CHLORIDE 0.9 % (FLUSH) 0.9 %
10 SYRINGE (ML) INJECTION EVERY 12 HOURS SCHEDULED
Status: CANCELLED | OUTPATIENT
Start: 2018-09-04

## 2018-09-04 RX ORDER — FAMOTIDINE 20 MG/1
20 TABLET, FILM COATED ORAL 2 TIMES DAILY
Status: CANCELLED | OUTPATIENT
Start: 2018-09-04

## 2018-09-04 RX ORDER — SODIUM CHLORIDE 9 MG/ML
INJECTION, SOLUTION INTRAVENOUS CONTINUOUS
Status: CANCELLED | OUTPATIENT
Start: 2018-09-04

## 2018-09-04 RX ORDER — MIDAZOLAM HYDROCHLORIDE 1 MG/ML
INJECTION INTRAMUSCULAR; INTRAVENOUS PRN
Status: DISCONTINUED | OUTPATIENT
Start: 2018-09-04 | End: 2018-09-04 | Stop reason: SDUPTHER

## 2018-09-04 RX ORDER — PROPOFOL 10 MG/ML
INJECTION, EMULSION INTRAVENOUS PRN
Status: DISCONTINUED | OUTPATIENT
Start: 2018-09-04 | End: 2018-09-04 | Stop reason: SDUPTHER

## 2018-09-04 RX ORDER — PROMETHAZINE HYDROCHLORIDE 25 MG/ML
INJECTION, SOLUTION INTRAMUSCULAR; INTRAVENOUS PRN
Status: DISCONTINUED | OUTPATIENT
Start: 2018-09-04 | End: 2018-09-04 | Stop reason: SDUPTHER

## 2018-09-04 RX ORDER — CEFAZOLIN SODIUM 1 G/3ML
INJECTION, POWDER, FOR SOLUTION INTRAMUSCULAR; INTRAVENOUS PRN
Status: DISCONTINUED | OUTPATIENT
Start: 2018-09-04 | End: 2018-09-04 | Stop reason: SDUPTHER

## 2018-09-04 RX ORDER — CYCLOBENZAPRINE HCL 10 MG
10 TABLET ORAL 3 TIMES DAILY PRN
Status: DISCONTINUED | OUTPATIENT
Start: 2018-09-04 | End: 2018-09-04

## 2018-09-04 RX ORDER — SENNA PLUS 8.6 MG/1
4 TABLET ORAL NIGHTLY
Status: CANCELLED | OUTPATIENT
Start: 2018-09-04

## 2018-09-04 RX ORDER — HYDRALAZINE HYDROCHLORIDE 20 MG/ML
5 INJECTION INTRAMUSCULAR; INTRAVENOUS EVERY 10 MIN PRN
Status: DISCONTINUED | OUTPATIENT
Start: 2018-09-04 | End: 2018-09-04 | Stop reason: HOSPADM

## 2018-09-04 RX ORDER — MEPERIDINE HYDROCHLORIDE 25 MG/ML
12.5 INJECTION INTRAMUSCULAR; INTRAVENOUS; SUBCUTANEOUS EVERY 5 MIN PRN
Status: DISCONTINUED | OUTPATIENT
Start: 2018-09-04 | End: 2018-09-04 | Stop reason: HOSPADM

## 2018-09-04 RX ORDER — LIDOCAINE HYDROCHLORIDE 20 MG/ML
INJECTION, SOLUTION INFILTRATION; PERINEURAL PRN
Status: DISCONTINUED | OUTPATIENT
Start: 2018-09-04 | End: 2018-09-04 | Stop reason: SDUPTHER

## 2018-09-04 RX ORDER — ATORVASTATIN CALCIUM 40 MG/1
40 TABLET, FILM COATED ORAL DAILY
Status: CANCELLED | OUTPATIENT
Start: 2018-09-05

## 2018-09-04 RX ORDER — ROCURONIUM BROMIDE 10 MG/ML
INJECTION, SOLUTION INTRAVENOUS PRN
Status: DISCONTINUED | OUTPATIENT
Start: 2018-09-04 | End: 2018-09-04 | Stop reason: SDUPTHER

## 2018-09-04 RX ADMIN — HYDROCODONE BITARTRATE AND ACETAMINOPHEN 2 TABLET: 5; 325 TABLET ORAL at 02:44

## 2018-09-04 RX ADMIN — SODIUM CHLORIDE: 9 INJECTION, SOLUTION INTRAVENOUS at 13:59

## 2018-09-04 RX ADMIN — CEPHALEXIN 500 MG: 500 CAPSULE ORAL at 05:30

## 2018-09-04 RX ADMIN — ENOXAPARIN SODIUM 80 MG: 80 INJECTION SUBCUTANEOUS at 22:27

## 2018-09-04 RX ADMIN — ROCURONIUM BROMIDE 30 MG: 10 INJECTION INTRAVENOUS at 15:51

## 2018-09-04 RX ADMIN — VECURONIUM BROMIDE 20 MG: 10 INJECTION, POWDER, LYOPHILIZED, FOR SOLUTION INTRAVENOUS at 14:26

## 2018-09-04 RX ADMIN — FENTANYL CITRATE 50 MCG: 50 INJECTION INTRAMUSCULAR; INTRAVENOUS at 19:40

## 2018-09-04 RX ADMIN — HYDROCODONE BITARTRATE AND ACETAMINOPHEN 2 TABLET: 5; 325 TABLET ORAL at 11:40

## 2018-09-04 RX ADMIN — VECURONIUM BROMIDE 20 MG: 10 INJECTION, POWDER, LYOPHILIZED, FOR SOLUTION INTRAVENOUS at 15:02

## 2018-09-04 RX ADMIN — SODIUM CHLORIDE: 9 INJECTION, SOLUTION INTRAVENOUS at 16:25

## 2018-09-04 RX ADMIN — SODIUM CHLORIDE: 9 INJECTION, SOLUTION INTRAVENOUS at 13:32

## 2018-09-04 RX ADMIN — TIZANIDINE 4 MG: 4 TABLET ORAL at 02:44

## 2018-09-04 RX ADMIN — FENTANYL CITRATE 50 MCG: 50 INJECTION INTRAMUSCULAR; INTRAVENOUS at 15:33

## 2018-09-04 RX ADMIN — LIDOCAINE HYDROCHLORIDE 80 MG: 20 INJECTION, SOLUTION INFILTRATION; PERINEURAL at 13:55

## 2018-09-04 RX ADMIN — FENTANYL CITRATE 50 MCG: 50 INJECTION INTRAMUSCULAR; INTRAVENOUS at 18:07

## 2018-09-04 RX ADMIN — TIZANIDINE 4 MG: 4 TABLET ORAL at 08:27

## 2018-09-04 RX ADMIN — CEFAZOLIN 2000 MG: 1 INJECTION, POWDER, FOR SOLUTION INTRAMUSCULAR; INTRAVENOUS; PARENTERAL at 18:14

## 2018-09-04 RX ADMIN — HYDROXYZINE PAMOATE 25 MG: 25 CAPSULE ORAL at 05:30

## 2018-09-04 RX ADMIN — ROPIVACAINE HYDROCHLORIDE 25 ML: 5 INJECTION, SOLUTION EPIDURAL; INFILTRATION; PERINEURAL at 20:30

## 2018-09-04 RX ADMIN — HYDROMORPHONE HYDROCHLORIDE 0.5 MG: 2 INJECTION INTRAMUSCULAR; INTRAVENOUS; SUBCUTANEOUS at 14:29

## 2018-09-04 RX ADMIN — CEFAZOLIN 2000 MG: 1 INJECTION, POWDER, FOR SOLUTION INTRAMUSCULAR; INTRAVENOUS; PARENTERAL at 14:28

## 2018-09-04 RX ADMIN — HEPARIN SODIUM AND DEXTROSE 24 UNITS/KG/HR: 10000; 5 INJECTION INTRAVENOUS at 06:50

## 2018-09-04 RX ADMIN — ROCURONIUM BROMIDE 4 MG: 10 INJECTION INTRAVENOUS at 16:25

## 2018-09-04 RX ADMIN — FENTANYL CITRATE 50 MCG: 50 INJECTION INTRAMUSCULAR; INTRAVENOUS at 18:55

## 2018-09-04 RX ADMIN — ROPIVACAINE HYDROCHLORIDE 15 ML: 5 INJECTION, SOLUTION EPIDURAL; INFILTRATION; PERINEURAL at 20:27

## 2018-09-04 RX ADMIN — HYDROCODONE BITARTRATE AND ACETAMINOPHEN 2 TABLET: 5; 325 TABLET ORAL at 06:51

## 2018-09-04 RX ADMIN — SODIUM CHLORIDE: 9 INJECTION, SOLUTION INTRAVENOUS at 08:30

## 2018-09-04 RX ADMIN — HYDROMORPHONE HYDROCHLORIDE 0.5 MG: 2 INJECTION INTRAMUSCULAR; INTRAVENOUS; SUBCUTANEOUS at 14:34

## 2018-09-04 RX ADMIN — MIDAZOLAM HYDROCHLORIDE 2 MG: 1 INJECTION, SOLUTION INTRAMUSCULAR; INTRAVENOUS at 13:47

## 2018-09-04 RX ADMIN — OXYCODONE HYDROCHLORIDE AND ACETAMINOPHEN 1 TABLET: 5; 325 TABLET ORAL at 22:23

## 2018-09-04 RX ADMIN — PROPOFOL 170 MG: 10 INJECTION, EMULSION INTRAVENOUS at 13:55

## 2018-09-04 RX ADMIN — HYDROMORPHONE HYDROCHLORIDE 0.5 MG: 2 INJECTION INTRAMUSCULAR; INTRAVENOUS; SUBCUTANEOUS at 15:45

## 2018-09-04 RX ADMIN — FENTANYL CITRATE 100 MCG: 50 INJECTION INTRAMUSCULAR; INTRAVENOUS at 13:52

## 2018-09-04 RX ADMIN — PROMETHAZINE HYDROCHLORIDE 6.25 MG: 25 INJECTION INTRAMUSCULAR; INTRAVENOUS at 15:26

## 2018-09-04 RX ADMIN — VECURONIUM BROMIDE 50 MG: 10 INJECTION, POWDER, LYOPHILIZED, FOR SOLUTION INTRAVENOUS at 13:56

## 2018-09-04 RX ADMIN — MEPERIDINE HYDROCHLORIDE 12.5 MG: 25 INJECTION INTRAMUSCULAR; INTRAVENOUS; SUBCUTANEOUS at 19:00

## 2018-09-04 RX ADMIN — HYDROMORPHONE HYDROCHLORIDE 0.5 MG: 2 INJECTION INTRAMUSCULAR; INTRAVENOUS; SUBCUTANEOUS at 14:46

## 2018-09-04 ASSESSMENT — PULMONARY FUNCTION TESTS
PIF_VALUE: 12
PIF_VALUE: 8
PIF_VALUE: 16
PIF_VALUE: 20
PIF_VALUE: 16
PIF_VALUE: 22
PIF_VALUE: 13
PIF_VALUE: 18
PIF_VALUE: 15
PIF_VALUE: 17
PIF_VALUE: 15
PIF_VALUE: 20
PIF_VALUE: 16
PIF_VALUE: 16
PIF_VALUE: 20
PIF_VALUE: 15
PIF_VALUE: 22
PIF_VALUE: 16
PIF_VALUE: 17
PIF_VALUE: 17
PIF_VALUE: 16
PIF_VALUE: 18
PIF_VALUE: 16
PIF_VALUE: 17
PIF_VALUE: 21
PIF_VALUE: 20
PIF_VALUE: 16
PIF_VALUE: 12
PIF_VALUE: 12
PIF_VALUE: 21
PIF_VALUE: 21
PIF_VALUE: 18
PIF_VALUE: 18
PIF_VALUE: 16
PIF_VALUE: 16
PIF_VALUE: 11
PIF_VALUE: 10
PIF_VALUE: 18
PIF_VALUE: 16
PIF_VALUE: 12
PIF_VALUE: 3
PIF_VALUE: 16
PIF_VALUE: 7
PIF_VALUE: 11
PIF_VALUE: 15
PIF_VALUE: 16
PIF_VALUE: 13
PIF_VALUE: 18
PIF_VALUE: 16
PIF_VALUE: 14
PIF_VALUE: 16
PIF_VALUE: 20
PIF_VALUE: 11
PIF_VALUE: 18
PIF_VALUE: 16
PIF_VALUE: 18
PIF_VALUE: 16
PIF_VALUE: 18
PIF_VALUE: 9
PIF_VALUE: 3
PIF_VALUE: 12
PIF_VALUE: 11
PIF_VALUE: 9
PIF_VALUE: 17
PIF_VALUE: 19
PIF_VALUE: 16
PIF_VALUE: 15
PIF_VALUE: 16
PIF_VALUE: 18
PIF_VALUE: 21
PIF_VALUE: 16
PIF_VALUE: 7
PIF_VALUE: 16
PIF_VALUE: 19
PIF_VALUE: 19
PIF_VALUE: 16
PIF_VALUE: 17
PIF_VALUE: 19
PIF_VALUE: 11
PIF_VALUE: 16
PIF_VALUE: 18
PIF_VALUE: 11
PIF_VALUE: 12
PIF_VALUE: 18
PIF_VALUE: 11
PIF_VALUE: 20
PIF_VALUE: 15
PIF_VALUE: 15
PIF_VALUE: 20
PIF_VALUE: 25
PIF_VALUE: 19
PIF_VALUE: 3
PIF_VALUE: 10
PIF_VALUE: 17
PIF_VALUE: 16
PIF_VALUE: 14
PIF_VALUE: 18
PIF_VALUE: 18
PIF_VALUE: 16
PIF_VALUE: 18
PIF_VALUE: 10
PIF_VALUE: 25
PIF_VALUE: 16
PIF_VALUE: 18
PIF_VALUE: 18
PIF_VALUE: 16
PIF_VALUE: 1
PIF_VALUE: 20
PIF_VALUE: 17
PIF_VALUE: 15
PIF_VALUE: 11
PIF_VALUE: 11
PIF_VALUE: 16
PIF_VALUE: 18
PIF_VALUE: 19
PIF_VALUE: 13
PIF_VALUE: 15
PIF_VALUE: 20
PIF_VALUE: 18
PIF_VALUE: 10
PIF_VALUE: 3
PIF_VALUE: 18
PIF_VALUE: 8
PIF_VALUE: 16
PIF_VALUE: 9
PIF_VALUE: 17
PIF_VALUE: 16
PIF_VALUE: 18
PIF_VALUE: 16
PIF_VALUE: 11
PIF_VALUE: 6
PIF_VALUE: 17
PIF_VALUE: 12
PIF_VALUE: 24
PIF_VALUE: 16
PIF_VALUE: 16
PIF_VALUE: 10
PIF_VALUE: 17
PIF_VALUE: 20
PIF_VALUE: 18
PIF_VALUE: 3
PIF_VALUE: 10
PIF_VALUE: 15
PIF_VALUE: 16
PIF_VALUE: 15
PIF_VALUE: 3
PIF_VALUE: 18
PIF_VALUE: 20
PIF_VALUE: 11
PIF_VALUE: 18
PIF_VALUE: 10
PIF_VALUE: 22
PIF_VALUE: 16
PIF_VALUE: 18
PIF_VALUE: 18
PIF_VALUE: 20
PIF_VALUE: 0
PIF_VALUE: 20
PIF_VALUE: 16
PIF_VALUE: 16
PIF_VALUE: 15
PIF_VALUE: 16
PIF_VALUE: 18
PIF_VALUE: 16
PIF_VALUE: 10
PIF_VALUE: 16
PIF_VALUE: 16
PIF_VALUE: 11
PIF_VALUE: 11
PIF_VALUE: 19
PIF_VALUE: 17
PIF_VALUE: 24
PIF_VALUE: 17
PIF_VALUE: 1
PIF_VALUE: 21
PIF_VALUE: 20
PIF_VALUE: 16
PIF_VALUE: 19
PIF_VALUE: 11
PIF_VALUE: 16
PIF_VALUE: 18
PIF_VALUE: 3
PIF_VALUE: 10
PIF_VALUE: 20
PIF_VALUE: 16
PIF_VALUE: 20
PIF_VALUE: 18
PIF_VALUE: 20
PIF_VALUE: 15
PIF_VALUE: 18
PIF_VALUE: 8
PIF_VALUE: 10
PIF_VALUE: 20
PIF_VALUE: 22
PIF_VALUE: 8
PIF_VALUE: 19
PIF_VALUE: 16
PIF_VALUE: 2
PIF_VALUE: 20
PIF_VALUE: 25
PIF_VALUE: 19
PIF_VALUE: 20
PIF_VALUE: 18
PIF_VALUE: 16
PIF_VALUE: 2
PIF_VALUE: 16
PIF_VALUE: 11
PIF_VALUE: 15
PIF_VALUE: 16
PIF_VALUE: 18
PIF_VALUE: 10
PIF_VALUE: 16
PIF_VALUE: 17
PIF_VALUE: 17
PIF_VALUE: 10
PIF_VALUE: 9
PIF_VALUE: 18
PIF_VALUE: 13
PIF_VALUE: 4
PIF_VALUE: 17
PIF_VALUE: 16
PIF_VALUE: 25
PIF_VALUE: 15
PIF_VALUE: 18
PIF_VALUE: 20
PIF_VALUE: 16
PIF_VALUE: 11
PIF_VALUE: 11
PIF_VALUE: 16
PIF_VALUE: 16
PIF_VALUE: 15
PIF_VALUE: 18
PIF_VALUE: 22
PIF_VALUE: 8
PIF_VALUE: 21
PIF_VALUE: 12
PIF_VALUE: 20
PIF_VALUE: 18
PIF_VALUE: 7
PIF_VALUE: 11
PIF_VALUE: 18
PIF_VALUE: 25
PIF_VALUE: 16
PIF_VALUE: 8
PIF_VALUE: 5
PIF_VALUE: 19
PIF_VALUE: 1
PIF_VALUE: 11
PIF_VALUE: 17
PIF_VALUE: 16
PIF_VALUE: 7
PIF_VALUE: 18
PIF_VALUE: 11
PIF_VALUE: 24
PIF_VALUE: 16
PIF_VALUE: 11

## 2018-09-04 ASSESSMENT — PAIN DESCRIPTION - LOCATION
LOCATION: FOOT;LEG
LOCATION: LEG;FOOT
LOCATION: LEG;FOOT

## 2018-09-04 ASSESSMENT — PAIN SCALES - GENERAL
PAINLEVEL_OUTOF10: 8
PAINLEVEL_OUTOF10: 3
PAINLEVEL_OUTOF10: 8
PAINLEVEL_OUTOF10: 7
PAINLEVEL_OUTOF10: 7
PAINLEVEL_OUTOF10: 2
PAINLEVEL_OUTOF10: 5
PAINLEVEL_OUTOF10: 0
PAINLEVEL_OUTOF10: 0
PAINLEVEL_OUTOF10: 8

## 2018-09-04 ASSESSMENT — PAIN DESCRIPTION - ORIENTATION
ORIENTATION: RIGHT;LEFT

## 2018-09-04 ASSESSMENT — PAIN DESCRIPTION - PAIN TYPE
TYPE: ACUTE PAIN

## 2018-09-04 ASSESSMENT — PAIN DESCRIPTION - DESCRIPTORS
DESCRIPTORS: ACHING

## 2018-09-04 ASSESSMENT — PAIN - FUNCTIONAL ASSESSMENT: PAIN_FUNCTIONAL_ASSESSMENT: 0-10

## 2018-09-04 NOTE — PROGRESS NOTES
Kristan Jones DPM at [facility]: RN okayed OT session. Upon arrival patient was lying in bed visting with family friend. Pt reports he would rather rest and visit at this time prior to sx. Pt denies any questions or concerns for OT. Equipment needs were reviewed and  notified on 7k of needs after sx. Assessment:  Assessment: Pt is making good progress towards goals. Pt has met 2/4 STGs and 0/2 LTGs. Pt has exhibited improvement in the following skill areas lateral transfers, safety, and UB Strength. Pt continues to exhibit decreased lateral transfers and ADL routine causing barriers to meeting pt's goals. Pt is undergoing ORIF of left pilon fracture this date then transfering to acute for further medical management. Equipment Recommendations:  Equipment Needed: Yes  Mobility Devices: Hospital Bed, Wheelchair  Other: Pt will need w/c at discharge (27 inch doorway to bedroom/bathroom, 36inch for other doorways). Also will need hospital bed. Plan:  Discharge patient to acute care after sx this date. Goals:  Short term goals  Time Frame for Short term goals: 1 week  Short term goal 1: Pt will complete BUE strengthening exercises with min vc for technique to increase indep and safety with transfers. (Met)   Short term goal 2: Pt will complete dynamic sitting task x 10 minutes Mod Indep to increase indep and safety with grooming.(Met)    Short term goal 3: Pt will complete lateral transfers to/from various surfaces with S and min vc for technique to increase indep and safety with toileting. (Not Met)   Short term goal 4: Pt will complete LB dressing with CGA and min vc for technique to increase indep and safety within home environment. (Not met)     Long term goals  Time Frame for Long term goals : 2 weeks  Long term goal 1: Pt will complete ADL routine with s/u to increase indep with self care. (Not Met)   Long term goal 2: Pt will complete lateral transfers to/from various surfaces with Natan and 0 vc for technique to increase indep and safety with toileting. (Not met)

## 2018-09-04 NOTE — ANESTHESIA PRE PROCEDURE
Department of Anesthesiology  Preprocedure Note       Name:  Armani Mendez   Age:  59 y.o.  :  1955                                          MRN:  [id number]         Date:  2018      Surgeon: Hakan Fisher):  Russ Joaquin DPM    Procedure: Procedure(s):  ORIF LEFT TIBIAL PILON, EX-FIX REMOVAL LEFT TIBIAL BONE MARROW HARVEST, PREPARATION OF GRAFT AND SITE, APPLICATION SKIN SUBSTITUTE GRAFT    Medications prior to admission:   Prior to Admission medications    Medication Sig Start Date End Date Taking? Authorizing Provider   warfarin (COUMADIN) 7.5 MG tablet Take 7.5 mg by mouth    Historical Provider, MD   Atorvastatin Calcium (LIPITOR PO) Take by mouth    Historical Provider, MD       Current medications:    No current facility-administered medications for this encounter. Allergies: Allergies   Allergen Reactions    Morphine        Problem List:    Patient Active Problem List   Diagnosis Code    Anticoagulated on Coumadin Z51.81, Z79.01    Fall W19. Beau Painter    Displaced intraarticular fracture of right calcaneus, initial encounter for open fracture S92.061B    Open left tibial fracture S82.202B    Open fracture of distal end of left fibula S82.832B    Accidental fall from ladder W11. XXXA    ELLEN (acute kidney injury) ([facility] [address] N17.9    H/O deep venous thrombosis Z86.718    Acute blood loss as cause of postoperative anemia D62    Accidental fall from ladder, initial encounter W11. Beau Painter    Anxiety about blushing F41.9    Inadequate pain control R52       Past Medical History:        Diagnosis Date    Embolism - blood clot     rt calf and groin    Hyperlipidemia        Past Surgical History:        Procedure Laterality Date    HIP SURGERY      lt    [place] OFFICE/OUTPT VISIT,PROCEDURE ONLY Right 2018    OPEN REDUCTION INTERNAL FIXATION OF PILON FRACTURE WITH EXTERNAL FIXATOR AND WOUND VAC PLACEMENT, RIGHT CALCANEOUS OPEN REDUCTION INTERNAL FIXATION performed by Russ Joaquin DPM at [facility]

## 2018-09-04 NOTE — PROGRESS NOTES
Patient refuses to turn as recommended Q2H and PRN. Education provided regarding the benefits of repositioning and the risk to skin integrity associated with not repositioning as recommended. Patient verbalized understanding of education, however still refused to turn stating \"I'm fine\". Patient is noted to shift his weight slightly to either side. Will continue to encourage repositioning Q2H and PRN.

## 2018-09-04 NOTE — BRIEF OP NOTE
3.5X36MM LCK SCR S-T Screw/Plate/Nail/Carlos SCREW EVOS 3.5X36MM LCK SCR S-T  ARITA   Left 1   SCREW EVOS 3.5X40MM LCK SCR S-T Screw/Plate/Nail/Carlos SCREW EVOS 3.5X40MM LCK SCR S-T  ARITA   Left 2   WASHER F/ 2.7MM SCREW Screw/Plate/Nail/Carlos WASHER F/ 2.7MM SCREW  SMITH   Left 1   SCREW EVOS 2.7X20MM LK T8 SHRT THRD Screw/Plate/Nail/Carlos SCREW EVOS 2.7X20MM LK T8 SHRT THRD  SMITH   Left 1   SCREW EVOS 2.7X16MM LK T8 SHRT THRD Screw/Plate/Nail/Carlos SCREW EVOS 2.7X16MM LK T8 SHRT THRD  SMITH   Left 1   SCREW EVOS 2.7X30MM LK T8 SHRT THRD Screw/Plate/Nail/Carlos SCREW EVOS 2.7X30MM LK T8 SHRT THRD  SMITH   Left 1   SCREW EVOS 2.7X34MM LK T8 SHRT THRD Screw/Plate/Nail/Carlos SCREW EVOS 2.7X34MM LK T8 SHRT THRD  SMITH   Left 1   SCREW EVOS 2.7X40MM LK T8 SHRT THRD Screw/Plate/Nail/Carlos SCREW EVOS 2.7X40MM LK T8 SHRT THRD  SMITH   Left 1   SCREW EVOS 2.7X34MM HELEN T8 SHRT THRD Screw/Plate/Nail/Carlos SCREW EVOS 2.7X34MM HELEN T8 SHRT THRD  SMITH   Left 1   SCREW EVOS 2.7X32MM HELEN T8 SHRT THRD Screw/Plate/Nail/Carlos SCREW EVOS 2.7X32MM HELEN T8 SHRT THRD  SMITH   Left 1   SCREW EVOS 2.7X34MM HELEN T8 SHRT THRD Screw/Plate/Nail/Carlos SCREW EVOS 2.7X34MM HELEN T8 SHRT THRD   SMITH    Left 1         Drains:   Negative Pressure Wound Therapy Leg Inner; Lower (Active)   $ Disposable NPWT <=50 sq cm PER TX $ Yes 8/25/2018  9:29 PM   Wound Type Surgical;Acute/Traumatic 9/4/2018  8:30 AM   Unit Type kci 8/31/2018  8:30 AM   Dressing Type Other (Comment) 9/4/2018  8:30 AM   Number of pieces used 1 8/31/2018  8:30 AM   Cycle Continuous 9/4/2018  8:30 AM   Target Pressure (mmHg) 125 9/4/2018  8:30 AM   Intensity 5 8/25/2018  9:29 PM   Canister changed? Yes 8/25/2018  9:29 PM   Dressing Status Clean;Dry; Intact 9/4/2018  8:30 AM   Dressing Changed Changed/New 8/31/2018  8:30 AM   Drainage Amount Small 9/4/2018  8:30 AM   Drainage Description Serosanguinous 9/4/2018  8:30 AM   Dressing Change Due 09/04/18 8/31/2018  8:30 AM   Output (ml) 25 ml 9/4/2018  8:30 AM   Wound Assessment Other (Comment) 9/4/2018  8:30 AM   Juli-wound Assessment Blanchable erythema;Edema;Fragile; Intact 8/31/2018  8:30 AM       Findings: consistent with dx. Severely comminuted tibial fracture.      Stevenson Lin 26  Date: 9/4/2018  Time: 6:25 PM

## 2018-09-04 NOTE — DISCHARGE SUMMARY
K 4.1 09/06/2018    K 4.2 09/05/2018    CL 98 09/06/2018    CO2 23 09/06/2018    BUN 12 09/06/2018    LABALBU 4.2 08/24/2018    CREATININE 1.0 09/06/2018    CALCIUM 8.8 09/06/2018    LABGLOM 76 09/06/2018    GLUCOSE 132 09/06/2018     PT/INR:    Lab Results   Component Value Date    INR 2.00 09/08/2018     Patient Instructions:    None    Follow-up visits: See after visit summary from hospitalization    Discharge Medications:   Kristyn Amaral   Home Medication Instructions [facility]:[id number]    Printed on:09/08/18 2125   Medication Information                      Atorvastatin Calcium (LIPITOR PO)  Take 40 mg by mouth daily              warfarin (COUMADIN) 7.5 MG tablet  Take 7.5 mg by mouth                35 minutes spent preparing the patient for discharge    Tato Gutierrez MD

## 2018-09-05 PROBLEM — D64.9 ANEMIA: Status: ACTIVE | Noted: 2018-09-05

## 2018-09-05 LAB
ANION GAP SERPL CALCULATED.3IONS-SCNC: 13 MEQ/L (ref 8–16)
APTT: 34 SECONDS (ref 22–38)
BASOPHILS # BLD: 0.1 %
BASOPHILS ABSOLUTE: 0 THOU/MM3 (ref 0–0.1)
BUN BLDV-MCNC: 15 MG/DL (ref 7–22)
CALCIUM SERPL-MCNC: 8.3 MG/DL (ref 8.5–10.5)
CHLORIDE BLD-SCNC: 102 MEQ/L (ref 98–111)
CO2: 23 MEQ/L (ref 23–33)
CREAT SERPL-MCNC: 1.1 MG/DL (ref 0.4–1.2)
EOSINOPHIL # BLD: 0.4 %
EOSINOPHILS ABSOLUTE: 0 THOU/MM3 (ref 0–0.4)
ERYTHROCYTE [DISTWIDTH] IN BLOOD BY AUTOMATED COUNT: 13.1 % (ref 11.5–14.5)
ERYTHROCYTE [DISTWIDTH] IN BLOOD BY AUTOMATED COUNT: 44.2 FL (ref 35–45)
GFR SERPL CREATININE-BSD FRML MDRD: 68 ML/MIN/1.73M2
GLUCOSE BLD-MCNC: 117 MG/DL (ref 70–108)
HCT VFR BLD CALC: 22.7 % (ref 42–52)
HEMOGLOBIN: 7.2 GM/DL (ref 14–18)
HEMOGLOBIN: 7.6 GM/DL (ref 14–18)
IMMATURE GRANS (ABS): 0.09 THOU/MM3 (ref 0–0.07)
IMMATURE GRANULOCYTES: 1.2 %
INR BLD: 1.26 (ref 0.85–1.13)
LYMPHOCYTES # BLD: 16.3 %
LYMPHOCYTES ABSOLUTE: 1.3 THOU/MM3 (ref 1–4.8)
MCH RBC QN AUTO: 29.6 PG (ref 26–33)
MCHC RBC AUTO-ENTMCNC: 31.7 GM/DL (ref 32.2–35.5)
MCV RBC AUTO: 93.4 FL (ref 80–94)
MONOCYTES # BLD: 11.5 %
MONOCYTES ABSOLUTE: 0.9 THOU/MM3 (ref 0.4–1.3)
NUCLEATED RED BLOOD CELLS: 0 /100 WBC
PLATELET # BLD: 287 THOU/MM3 (ref 130–400)
PMV BLD AUTO: 8.7 FL (ref 9.4–12.4)
POTASSIUM REFLEX MAGNESIUM: 4.2 MEQ/L (ref 3.5–5.2)
RBC # BLD: 2.43 MILL/MM3 (ref 4.7–6.1)
SEG NEUTROPHILS: 70.5 %
SEGMENTED NEUTROPHILS ABSOLUTE COUNT: 5.4 THOU/MM3 (ref 1.8–7.7)
SODIUM BLD-SCNC: 138 MEQ/L (ref 135–145)
WBC # BLD: 7.7 THOU/MM3 (ref 4.8–10.8)

## 2018-09-05 PROCEDURE — 85610 PROTHROMBIN TIME: CPT

## 2018-09-05 PROCEDURE — 6370000000 HC RX 637 (ALT 250 FOR IP): Performed by: PODIATRIST

## 2018-09-05 PROCEDURE — 85025 COMPLETE CBC W/AUTO DIFF WBC: CPT

## 2018-09-05 PROCEDURE — G8978 MOBILITY CURRENT STATUS: HCPCS

## 2018-09-05 PROCEDURE — 6360000002 HC RX W HCPCS: Performed by: INTERNAL MEDICINE

## 2018-09-05 PROCEDURE — 96361 HYDRATE IV INFUSION ADD-ON: CPT

## 2018-09-05 PROCEDURE — 96374 THER/PROPH/DIAG INJ IV PUSH: CPT

## 2018-09-05 PROCEDURE — G8979 MOBILITY GOAL STATUS: HCPCS

## 2018-09-05 PROCEDURE — 97162 PT EVAL MOD COMPLEX 30 MIN: CPT

## 2018-09-05 PROCEDURE — 80048 BASIC METABOLIC PNL TOTAL CA: CPT

## 2018-09-05 PROCEDURE — 6370000000 HC RX 637 (ALT 250 FOR IP): Performed by: STUDENT IN AN ORGANIZED HEALTH CARE EDUCATION/TRAINING PROGRAM

## 2018-09-05 PROCEDURE — 94760 N-INVAS EAR/PLS OXIMETRY 1: CPT

## 2018-09-05 PROCEDURE — 96372 THER/PROPH/DIAG INJ SC/IM: CPT

## 2018-09-05 PROCEDURE — 6370000000 HC RX 637 (ALT 250 FOR IP): Performed by: PHYSICIAN ASSISTANT

## 2018-09-05 PROCEDURE — 6360000002 HC RX W HCPCS: Performed by: STUDENT IN AN ORGANIZED HEALTH CARE EDUCATION/TRAINING PROGRAM

## 2018-09-05 PROCEDURE — 87040 BLOOD CULTURE FOR BACTERIA: CPT

## 2018-09-05 PROCEDURE — 97530 THERAPEUTIC ACTIVITIES: CPT

## 2018-09-05 PROCEDURE — 85730 THROMBOPLASTIN TIME PARTIAL: CPT

## 2018-09-05 PROCEDURE — 96376 TX/PRO/DX INJ SAME DRUG ADON: CPT

## 2018-09-05 PROCEDURE — 2580000003 HC RX 258: Performed by: STUDENT IN AN ORGANIZED HEALTH CARE EDUCATION/TRAINING PROGRAM

## 2018-09-05 PROCEDURE — 1200000000 HC SEMI PRIVATE

## 2018-09-05 PROCEDURE — 85018 HEMOGLOBIN: CPT

## 2018-09-05 PROCEDURE — 6370000000 HC RX 637 (ALT 250 FOR IP)

## 2018-09-05 PROCEDURE — 36415 COLL VENOUS BLD VENIPUNCTURE: CPT

## 2018-09-05 RX ORDER — HYDROCODONE BITARTRATE AND ACETAMINOPHEN 5; 325 MG/1; MG/1
2 TABLET ORAL EVERY 4 HOURS PRN
Status: DISCONTINUED | OUTPATIENT
Start: 2018-09-05 | End: 2018-09-08 | Stop reason: HOSPADM

## 2018-09-05 RX ORDER — OXYCODONE HCL 10 MG/1
10 TABLET, FILM COATED, EXTENDED RELEASE ORAL EVERY 12 HOURS SCHEDULED
Status: DISCONTINUED | OUTPATIENT
Start: 2018-09-05 | End: 2018-09-08 | Stop reason: HOSPADM

## 2018-09-05 RX ORDER — HYDROCODONE BITARTRATE AND ACETAMINOPHEN 5; 325 MG/1; MG/1
1 TABLET ORAL EVERY 4 HOURS PRN
Status: DISCONTINUED | OUTPATIENT
Start: 2018-09-05 | End: 2018-09-08 | Stop reason: HOSPADM

## 2018-09-05 RX ORDER — SENNA PLUS 8.6 MG/1
2 TABLET ORAL 2 TIMES DAILY
Status: DISCONTINUED | OUTPATIENT
Start: 2018-09-05 | End: 2018-09-08 | Stop reason: HOSPADM

## 2018-09-05 RX ORDER — DOCUSATE SODIUM 100 MG/1
100 CAPSULE, LIQUID FILLED ORAL 2 TIMES DAILY
Status: DISCONTINUED | OUTPATIENT
Start: 2018-09-05 | End: 2018-09-08 | Stop reason: HOSPADM

## 2018-09-05 RX ORDER — BISACODYL 10 MG
10 SUPPOSITORY, RECTAL RECTAL
Status: ACTIVE | OUTPATIENT
Start: 2018-09-06 | End: 2018-09-06

## 2018-09-05 RX ORDER — WARFARIN SODIUM 10 MG/1
10 TABLET ORAL
Status: COMPLETED | OUTPATIENT
Start: 2018-09-05 | End: 2018-09-05

## 2018-09-05 RX ORDER — POLYETHYLENE GLYCOL 3350 17 G/17G
17 POWDER, FOR SOLUTION ORAL DAILY
Status: DISCONTINUED | OUTPATIENT
Start: 2018-09-06 | End: 2018-09-08 | Stop reason: HOSPADM

## 2018-09-05 RX ADMIN — HYDROCODONE BITARTRATE AND ACETAMINOPHEN 2 TABLET: 5; 325 TABLET ORAL at 18:29

## 2018-09-05 RX ADMIN — ENOXAPARIN SODIUM 80 MG: 80 INJECTION SUBCUTANEOUS at 11:23

## 2018-09-05 RX ADMIN — OXYCODONE HYDROCHLORIDE AND ACETAMINOPHEN 2 TABLET: 5; 325 TABLET ORAL at 13:10

## 2018-09-05 RX ADMIN — ENOXAPARIN SODIUM 80 MG: 80 INJECTION SUBCUTANEOUS at 22:45

## 2018-09-05 RX ADMIN — SENNOSIDES 17.2 MG: 8.6 TABLET, FILM COATED ORAL at 20:36

## 2018-09-05 RX ADMIN — OXYCODONE HYDROCHLORIDE 10 MG: 10 TABLET, FILM COATED, EXTENDED RELEASE ORAL at 15:28

## 2018-09-05 RX ADMIN — HYDROCODONE BITARTRATE AND ACETAMINOPHEN 2 TABLET: 5; 325 TABLET ORAL at 22:45

## 2018-09-05 RX ADMIN — HYDROMORPHONE HYDROCHLORIDE 0.5 MG: 1 INJECTION, SOLUTION INTRAMUSCULAR; INTRAVENOUS; SUBCUTANEOUS at 20:36

## 2018-09-05 RX ADMIN — OXYCODONE HYDROCHLORIDE AND ACETAMINOPHEN 2 TABLET: 5; 325 TABLET ORAL at 09:00

## 2018-09-05 RX ADMIN — Medication 10 ML: at 20:37

## 2018-09-05 RX ADMIN — TIZANIDINE 4 MG: 4 TABLET ORAL at 10:08

## 2018-09-05 RX ADMIN — TIZANIDINE 4 MG: 4 TABLET ORAL at 16:18

## 2018-09-05 RX ADMIN — DOCUSATE SODIUM 100 MG: 100 CAPSULE, LIQUID FILLED ORAL at 20:36

## 2018-09-05 RX ADMIN — HYDROMORPHONE HYDROCHLORIDE 0.5 MG: 1 INJECTION, SOLUTION INTRAMUSCULAR; INTRAVENOUS; SUBCUTANEOUS at 23:44

## 2018-09-05 RX ADMIN — TIZANIDINE 4 MG: 4 TABLET ORAL at 22:45

## 2018-09-05 RX ADMIN — OXYCODONE HYDROCHLORIDE AND ACETAMINOPHEN 2 TABLET: 5; 325 TABLET ORAL at 02:37

## 2018-09-05 RX ADMIN — HYDROMORPHONE HYDROCHLORIDE 0.5 MG: 1 INJECTION, SOLUTION INTRAMUSCULAR; INTRAVENOUS; SUBCUTANEOUS at 11:19

## 2018-09-05 RX ADMIN — WARFARIN SODIUM 10 MG: 10 TABLET ORAL at 17:24

## 2018-09-05 RX ADMIN — HYDROMORPHONE HYDROCHLORIDE 0.5 MG: 1 INJECTION, SOLUTION INTRAMUSCULAR; INTRAVENOUS; SUBCUTANEOUS at 14:25

## 2018-09-05 RX ADMIN — HYDROMORPHONE HYDROCHLORIDE 0.5 MG: 1 INJECTION, SOLUTION INTRAMUSCULAR; INTRAVENOUS; SUBCUTANEOUS at 17:25

## 2018-09-05 ASSESSMENT — PAIN SCALES - GENERAL
PAINLEVEL_OUTOF10: 7
PAINLEVEL_OUTOF10: 10
PAINLEVEL_OUTOF10: 10
PAINLEVEL_OUTOF10: 6
PAINLEVEL_OUTOF10: 7
PAINLEVEL_OUTOF10: 7
PAINLEVEL_OUTOF10: 10
PAINLEVEL_OUTOF10: 7
PAINLEVEL_OUTOF10: 6
PAINLEVEL_OUTOF10: 10

## 2018-09-05 ASSESSMENT — PAIN DESCRIPTION - LOCATION
LOCATION: LEG

## 2018-09-05 ASSESSMENT — PAIN DESCRIPTION - ORIENTATION
ORIENTATION: LEFT
ORIENTATION: RIGHT
ORIENTATION: LEFT
ORIENTATION: LEFT

## 2018-09-05 ASSESSMENT — PAIN DESCRIPTION - PAIN TYPE
TYPE: SURGICAL PAIN

## 2018-09-05 ASSESSMENT — PAIN DESCRIPTION - DESCRIPTORS: DESCRIPTORS: SHARP

## 2018-09-05 ASSESSMENT — PAIN - FUNCTIONAL ASSESSMENT: PAIN_FUNCTIONAL_ASSESSMENT: 0-10

## 2018-09-05 NOTE — CONSULTS
Consults       Consult History & Physical      Patient:  Yuni Nicolas  YOB: 1955    MRN: [id number]     Acct: [de-identified]      Chief Complaint:  none    Date of Service: Pt seen/examined in consultation on9. 4.2018    History Of Present Illness:      59 y.o. male who we are asked to see/evaluate by Kristan Jones DPM for medical management of bridging to coumadin    Past Medical History:        Diagnosis Date    Embolism - blood clot     rt calf and groin    Hyperlipidemia        Past Surgical History:        Procedure Laterality Date    HIP SURGERY      lt    CA OFFICE/OUTPT VISIT,PROCEDURE ONLY Right 8/25/2018    OPEN REDUCTION INTERNAL FIXATION OF PILON FRACTURE WITH EXTERNAL FIXATOR AND WOUND VAC PLACEMENT, RIGHT CALCANEOUS OPEN REDUCTION INTERNAL FIXATION performed by Kristan Jones DPM at [place] [facility]       Medications Prior to Admission:    Prior to Admission medications    Medication Sig Start Date End Date Taking? Authorizing Provider   warfarin (COUMADIN) 7.5 MG tablet Take 7.5 mg by mouth     Historical Provider, MD   Atorvastatin Calcium (LIPITOR PO) Take 40 mg by mouth daily     Historical Provider, MD       Allergies:  Morphine    Social History:      The patient currently livesspouse    TOBACCO:   reports that he has never smoked. He has never used smokeless tobacco.  ETOH:   reports that he does not drink alcohol. Family History:         No family history on file. Diet:       REVIEW OF SYSTEMS:   Pertinent positives as noted in the HPI. All other systems reviewed and negative. PHYSICAL EXAM:  BP (!) 142/67   Pulse 93   Temp 99.4 °F (37.4 °C) (Oral)   Resp 16   Ht 5' 4\" (1.626 m)   Wt 175 lb (79.4 kg)   SpO2 99%   BMI 30.04 kg/m²   General appearance:  No apparent distress, appears stated age and cooperative. HEENT:  Normal cephalic, atraumatic without obvious deformity. Pupils equal, round, and reactive to light. Extra ocular muscles intact.  Conjunctivae/corneas

## 2018-09-05 NOTE — CARE COORDINATION
9/5/18, 10:05 AM      Krys Ruelas       Admitted from: [facility] 9/4/2018/ [address] day: 1   Location: [facility] Reason for admit: Inadequate pain control [R52]  Inadequate pain control [R52] Status: OP in bed  Admit order signed?: yes  PMH:  has a past medical history of Embolism - blood clot and Hyperlipidemia. Procedure:   9/5 ORIF LEFT TIBIAL PILON, EX-FIX REMOVAL LEFT TIBIAL BONE MARROW HARVEST, APPLICATION OF BONE GRAFT, PREPARATION OF GRAFT AND SITE, APPLICATION SKIN SUBSTITUTE GRAFT, APPLICATION OF WOUND VAC  Pertinent abnormal Imaging:none  Medications:  Scheduled Meds:   sodium chloride flush  10 mL Intravenous 2 times per day    enoxaparin  1 mg/kg Subcutaneous Q12H    warfarin (COUMADIN) daily dosing (placeholder)   Other RX Placeholder     Continuous Infusions:   Pertinent Info/Orders/Treatment Plan:  Podiatry following. ID consulted. Hgb 7.2. PT/OT. NWB. Wound vac. Pain control. Diet: DIET GENERAL;   DVT Prophylaxis: Coumadin  Smoking status:  reports that he has never smoked. He has never used smokeless tobacco.   Influenza Vaccination Screening Completed: n/a  Pneumonia Vaccination Screening Completed: yes  PCP: Maurilio Schmitt MD  Readmission: no  Readmission Risk Score: 9%    Discharge Planning  Current Residence:  Other (Comment) (Came from [facility])  Living Arrangements:  Spouse/Significant Other   Support Systems:  Children, Family Members  Current Services PTA:     Potential Assistance Needed:  N/A  Potential Assistance Purchasing Medications:  No  Does patient want to participate in local refill/ meds to beds program?  Yes  Type of Home Care Services:  None  Patient expects to be discharged to:  Home  Expected Discharge date:  09/10/18  Follow Up Appointment: Best Day/ Time: Monday AM    Discharge Plan: Spoke with patient, plans to return home with wife. [facility] given to patient as he will require a home wound vac. Wound vac order placed on chart to be signed.  Perfect serve message sent to

## 2018-09-05 NOTE — OP NOTE
[address] [place] [address]                                 OPERATIVE REPORT    PATIENT NAME: Pranav Mohr                       :        1955  MED REC NO:   [id number]                           ROOM:       2438  ACCOUNT NO:   [de-identified]                           ADMIT DATE: 2018  PROVIDER:     Rigo Reyes. Roxy Duarte D.P.M. DATE OF PROCEDURE:  2018    PREOPERATIVE DIAGNOSES:  1. Retained external fixator, left lower extremity. 2.  Comminuted left pilon fracture. 3.  Comminuted left fibular fracture. 4.  Open traumatic wound measuring 9 x 10 cm. POSTOPERATIVE DIAGNOSES:  1. Retained external fixator, left lower extremity. 2.  Comminuted left pilon fracture. 3.  Comminuted left fibular fracture. 4.  Open traumatic wound measuring 9 x 10 cm. PROCEDURES PERFORMED:  1.  [place] of tibial bone marrow aspirate. 2.  Removal of retained external fixator. 3.  Open reduction internal fixation of left comminuted fibular fracture. 4.  Open reduction internal fixation of left comminuted pilon fracture. 5.  Wound preparation measuring 9 x 10 cm. 6.  Application of skin substitute graft measuring 9 x 10 cm. 7.  Application of negative-pressure wound therapy to an area measuring  greater than 10 sq. cm.    SURGEON:  Rigo Reyes. Roxy Duarte D.P.M. ASSISTANTS:  Saurabh Villagomez, PGY-3, and Cande Chen, PGY-1.    HEMOSTASIS:  Well-padded pneumatic thigh tourniquet set at 300 mmHg, was  initially inflated to approximately 75 minutes, was let down for  approximately 20 minutes, and then reinflated for an additional hour and a  half.     MATERIALS USED:  Consisted of the following:  A [employer]iwal [employer] EVOS small  fragment posterior malleolar fracture plate with corresponding three 3.5-mm  locking screws proximal, two 3.5-mm screws distal.  A one-third tubular  8-hole plate for fibular fixation consisting of 3.5-mm locking chlorhexidine surgical scrub and draped in a sterile  fashion. 4.  Anterior placement for pilon fracture fixation:  A longitudinal  incision was made in a Bohler's incision technique coursing between the  extensor hallucis and extensor digitorum muscle bellies. An initial  incision was made approximately 20 cm in total length, full thickness, with  a 15 scalpel blade followed by blunt dissection with Bovie electrocautery  and Metzenbaum scissors. The coursing neurovascular structures were  identified and carefully retracted out of view allowing for visualization  of a severely comminuted distal lateral tibia with multiple fragments  necessitating need for a percutaneous screw fixation prior to plating. The  distal fragment, a Tillaux-Chaput fracture was noted and fixated into a  large medial fragment with a 2.7-mm locking screw. Medially, there was  noted to be a step-off in which we had to use an EVOS mini 2.7-mm straight  plate with corresponding locking screws. Upon getting the small  fragmentary fractures back into place, we were able to further adjust and  compensate for the significant fragmentation of the site. The patient was  noted to have moderate instability of his syndesmosis due to a lateral  fracture of his tibia which was reduced using a tenaculum and a 3.5-mm  cancellous screw with corresponding washer. This was noted to have good  reduction and an anterolateral pilon locking plate was then obtained which  was an intra-articular plate with 3.5 and 0.0-CN options distally and  3.5-mm locking screws proximally. We had approximately four points of  fixation distal and three points of fixation proximal noting good anatomic  restoration of the tibia both on AP, mortise, and lateral views. Surgical  site was then flushed with copious amounts of normal sterile saline and the  underlying incision was then reapproximated with a 2-0 Vicryl, 3-0  Monocryl, and ZipLine skin closure.   Attention was then directed to the  medial leg. 5.  Preparation of skin graft: The wound measured approximately 9 x 10 cm. He is noted to have some necrosis along the distal aspect which was  carefully debrided using a Metzenbaum scissor and pickup as well as a small  scalpel blade and curette. Upon removing all nonviable tissue down the  level of good granular bleeding tissue, a skin substitute graft, Integra  meshed bilayer was obtained and stapled to the periphery noting good  apposition of the graft on the wound site. Residual graft was then  carefully scraped off the silicone layer and packed within the interface  between the wound bed and the graft allowing for increased granulation  tissue. At this point in time, a large GranuFoam 10X10 RoomI wound VAC kit was  obtained, cut to size, and fixated to the wound noting good suction at  approximately 125 mmHg continuous. Upon completion of all stated  procedures, the patient was then placed in a posterior splint and was  transferred to PACU in stable condition. The patient did have his  contralateral splint changed where we had done our previous ORIF of his  right calcaneus. The patient will remain inpatient with plan for discharge  in approximately 48 to 72 hours. All questions and concerns regarding  preoperative management and postop protocol were discussed with family. We  will see the patient tomorrow evening and plan for discharge near the  coming week.         Kaylyn Dandy, D.P.M.    D: 09/04/2018 18:27:59       T: 09/04/2018 21:28:46     [email or web address]  Job#: [id number]     Doc#: [id number]    CC:

## 2018-09-05 NOTE — H&P
09/03/18   0734  09/05/18   0603   WBC  6.3   --   7.7   HGB  7.9*  8.4*  7.2*   HCT  24.8*  26.3*  22.7*   PLT  406*   --   287        Recent Labs      09/05/18   0603   NA  138   K  4.2   CL  102   CO2  23   BUN  15   CREATININE  1.1        Recent Labs      09/04/18   0445  09/05/18   0603   INR   --   1.26*   APTT  102.0*  34.0      No results for input(s): CKTOTAL, CKMB, CKMBINDEX, TROPONINI in the last 72 hours. Assessment: 59 y.o. male with:  Principle    Chronic:  Patient Active Problem List   Diagnosis    Anticoagulated on Coumadin    Fall    Displaced intraarticular fracture of right calcaneus, initial encounter for open fracture    Open left tibial fracture    Open fracture of distal end of left fibula    Accidental fall from ladder    ELLEN (acute kidney injury) ([facility] [address] [address]    H/O deep venous thrombosis    Acute blood loss as cause of postoperative anemia    Accidental fall from ladder, initial encounter    Anxiety about [place]    Inadequate pain control         Plan  1. s/p ORIF right calcaneus and ORIF left pilon fracture with removal of ex-fix and application of wound vac, left leg.   -Continue with current pain regimen  -Continue with IV antibiotics, ID following  -Dressing to right leg left in place. Left leg dressing/wound vac left intact.  -Continue strict bedrest, continue with PT/OT, upper extremity activities as tolerated   -Discuss with patient that limited light touch sensation to left toes are due to pop block. - Podiatry will continue following.     2. History of DVT   - labs reviewed  - IM following and bridging     Dispo:   Pending pain control, likely on Thursday or Friday.  Patient will need wound vac, case management consulted    Jhon Cohn DPM, PGY-III  Foot and Ankle Surgical Resident  9/5/2018   9:30 AM    Kisha Salazar of [facility]   Electronically signed by Tosha Diaz DPM on 9/9/2018 at 8:47 PM

## 2018-09-05 NOTE — PROGRESS NOTES
Hospitalist Progress Note    Patient:  Lori Garnica      Unit/Bed:7K-26/026-A    YOB: 1955    MRN: [id number]       Acct: [de-identified]     PCP: Missy Garcia MD    Date of Admission: 9/4/2018    Chief Complaint: Right open calcaneal fx     Hospital Course: Patient came to the hospital to have surgery on calcaneal fx. This was second surgery, first was on 8/25/2018. Pt fell off of a ladder. IM was consulted to bridge to Coumadin. On 9/5/2018, Pt's Hgb was 7.2, Podiatry and IM talked and agreed to give the patient one unit of blood. Subjective: Pt has no complaints other than lack of pain control. Nurse just gave Dilaudid. Pt denies CP, SOB, dizziness. Medications:  Reviewed    Infusion Medications   Scheduled Medications    sodium chloride flush  10 mL Intravenous 2 times per day    enoxaparin  1 mg/kg Subcutaneous Q12H    warfarin (COUMADIN) daily dosing (placeholder)   Other RX Placeholder     PRN Meds: sodium chloride flush, acetaminophen, ondansetron, HYDROmorphone, oxyCODONE-acetaminophen **OR** oxyCODONE-acetaminophen, tiZANidine      Intake/Output Summary (Last 24 hours) at 09/05/18 1138  Last data filed at 09/05/18 1026   Gross per 24 hour   Intake          3922.31 ml   Output             1975 ml   Net          1947.31 ml       Diet:  DIET GENERAL;    Exam:  /60   Pulse 95   Temp 99.4 °F (37.4 °C) (Oral)   Resp 18   Ht 5' 4\" (1.626 m)   Wt 175 lb (79.4 kg)   SpO2 95%   BMI 30.04 kg/m²     General appearance: No apparent distress, appears stated age and cooperative. HEENT: Pupils equal, round, and reactive to light. Conjunctivae/corneas clear. Neck: Supple, with full range of motion. Trachea midline. Respiratory:  Normal respiratory effort. Clear to auscultation, bilaterally without Rales/Wheezes/Rhonchi. Cardiovascular: Regular rate and rhythm with normal S1/S2 without murmurs, rubs or gallops.   Abdomen: Soft, non-tender, non-distended with normal bowel sounds. Musculoskeletal: passive and active ROM x 2 extremities. Both legs are wrapped in casts from surgery for fractures. Skin: Skin color, texture, turgor normal.  No rashes or lesions. Neurologic:  Neurovascularly intact without any focal sensory/motor deficits. Cranial nerves: II-XII intact, grossly non-focal.  Psychiatric: Alert and oriented, thought content appropriate, normal insight  Capillary Refill: Brisk,< 3 seconds   Peripheral Pulses: +2 palpable, equal bilaterally       Labs:   Recent Labs      09/02/18   1639  09/03/18   0734  09/05/18   0603   WBC  6.3   --   7.7   HGB  7.9*  8.4*  7.2*   HCT  24.8*  26.3*  22.7*   PLT  406*   --   287     Recent Labs      09/05/18   0603   NA  138   K  4.2   CL  102   CO2  23   BUN  15   CREATININE  1.1   CALCIUM  8.3*     No results for input(s): AST, ALT, BILIDIR, BILITOT, ALKPHOS in the last 72 hours. Recent Labs      09/05/18   0603   INR  1.26*     No results for input(s): Nathalia [place] in the last 72 hours. Urinalysis:    Lab Results   Component Value Date    NITRU NEGATIVE 08/24/2018    BLOODU NEGATIVE 08/24/2018    SPECGRAV 1.023 08/24/2018       Radiology:  FLUORO FOR SURGICAL PROCEDURES   Final Result      XR ANKLE LEFT (2 VIEWS)    (Results Pending)       Diet: DIET GENERAL;    DVT prophylaxis: [x] Lovenox                                 [] SCDs                                 [] SQ Heparin                                 [] Encourage ambulation           [] Already on Anticoagulation     Disposition:    [x] Home       [] TCU       [] Rehab       [] Psych       [] SNF       [] [place]       [] Other-    Code Status: Full Code    PT/OT Eval Status: Per Podiatry     Assessment/Plan:    Anticipated Discharge in : Per [address] Problems    Diagnosis Date Noted    Anemia [D64.9] 09/05/2018    Inadequate pain control [R52] 09/04/2018    H/O deep venous thrombosis [Z86.718] 08/28/2018       1.  History of DVT

## 2018-09-05 NOTE — FLOWSHEET NOTE
[address] [place]AT[place]  [facility] ORTHOPEDICS 7K  7K-26/026-A      Date: 2018  Patient Name: Jamison Hilton        CSN: [id number]   : 1955  (59 y.o.)  Gender: male                REASON FOR MISSED TREATMENT:  Missed Treat. Pt complaining of 11/10 pain at this time and is unable to participate in OT eval.  Pt is also pending possible blood transfusion d/t low Hgb. RN aware of pain levels. OT will initiate evaluation tomorrow AM per pt tolerance.

## 2018-09-06 LAB
ABO: NORMAL
ANION GAP SERPL CALCULATED.3IONS-SCNC: 15 MEQ/L (ref 8–16)
ANTIBODY SCREEN: NORMAL
BUN BLDV-MCNC: 12 MG/DL (ref 7–22)
CALCIUM SERPL-MCNC: 8.8 MG/DL (ref 8.5–10.5)
CHLORIDE BLD-SCNC: 98 MEQ/L (ref 98–111)
CO2: 23 MEQ/L (ref 23–33)
CREAT SERPL-MCNC: 1 MG/DL (ref 0.4–1.2)
GFR SERPL CREATININE-BSD FRML MDRD: 76 ML/MIN/1.73M2
GLUCOSE BLD-MCNC: 132 MG/DL (ref 70–108)
HCT VFR BLD CALC: 21.7 % (ref 42–52)
HCT VFR BLD CALC: 24.5 % (ref 42–52)
HCT VFR BLD CALC: 24.5 % (ref 42–52)
HCT VFR BLD CALC: 25.3 % (ref 42–52)
HEMOGLOBIN: 7 GM/DL (ref 14–18)
HEMOGLOBIN: 7.7 GM/DL (ref 14–18)
HEMOGLOBIN: 7.7 GM/DL (ref 14–18)
HEMOGLOBIN: 8 GM/DL (ref 14–18)
INR BLD: 1.34 (ref 0.85–1.13)
POTASSIUM SERPL-SCNC: 4.1 MEQ/L (ref 3.5–5.2)
RH FACTOR: NORMAL
SODIUM BLD-SCNC: 136 MEQ/L (ref 135–145)

## 2018-09-06 PROCEDURE — 6370000000 HC RX 637 (ALT 250 FOR IP)

## 2018-09-06 PROCEDURE — 86923 COMPATIBILITY TEST ELECTRIC: CPT

## 2018-09-06 PROCEDURE — 86850 RBC ANTIBODY SCREEN: CPT

## 2018-09-06 PROCEDURE — G8987 SELF CARE CURRENT STATUS: HCPCS

## 2018-09-06 PROCEDURE — 85014 HEMATOCRIT: CPT

## 2018-09-06 PROCEDURE — G8988 SELF CARE GOAL STATUS: HCPCS

## 2018-09-06 PROCEDURE — 2580000003 HC RX 258: Performed by: STUDENT IN AN ORGANIZED HEALTH CARE EDUCATION/TRAINING PROGRAM

## 2018-09-06 PROCEDURE — 85018 HEMOGLOBIN: CPT

## 2018-09-06 PROCEDURE — 97166 OT EVAL MOD COMPLEX 45 MIN: CPT

## 2018-09-06 PROCEDURE — 6370000000 HC RX 637 (ALT 250 FOR IP): Performed by: PODIATRIST

## 2018-09-06 PROCEDURE — 36415 COLL VENOUS BLD VENIPUNCTURE: CPT

## 2018-09-06 PROCEDURE — 80048 BASIC METABOLIC PNL TOTAL CA: CPT

## 2018-09-06 PROCEDURE — P9016 RBC LEUKOCYTES REDUCED: HCPCS

## 2018-09-06 PROCEDURE — 85610 PROTHROMBIN TIME: CPT

## 2018-09-06 PROCEDURE — 86900 BLOOD TYPING SEROLOGIC ABO: CPT

## 2018-09-06 PROCEDURE — 97110 THERAPEUTIC EXERCISES: CPT

## 2018-09-06 PROCEDURE — 97530 THERAPEUTIC ACTIVITIES: CPT

## 2018-09-06 PROCEDURE — 6360000002 HC RX W HCPCS: Performed by: PHYSICIAN ASSISTANT

## 2018-09-06 PROCEDURE — 1200000000 HC SEMI PRIVATE

## 2018-09-06 PROCEDURE — 97542 WHEELCHAIR MNGMENT TRAINING: CPT

## 2018-09-06 PROCEDURE — 86901 BLOOD TYPING SEROLOGIC RH(D): CPT

## 2018-09-06 PROCEDURE — 6370000000 HC RX 637 (ALT 250 FOR IP): Performed by: PHYSICIAN ASSISTANT

## 2018-09-06 PROCEDURE — 96376 TX/PRO/DX INJ SAME DRUG ADON: CPT

## 2018-09-06 RX ORDER — 0.9 % SODIUM CHLORIDE 0.9 %
250 INTRAVENOUS SOLUTION INTRAVENOUS ONCE
Status: DISCONTINUED | OUTPATIENT
Start: 2018-09-06 | End: 2018-09-08 | Stop reason: HOSPADM

## 2018-09-06 RX ORDER — WARFARIN SODIUM 7.5 MG/1
7.5 TABLET ORAL
Status: COMPLETED | OUTPATIENT
Start: 2018-09-06 | End: 2018-09-06

## 2018-09-06 RX ADMIN — HYDROCODONE BITARTRATE AND ACETAMINOPHEN 2 TABLET: 5; 325 TABLET ORAL at 02:48

## 2018-09-06 RX ADMIN — HYDROCODONE BITARTRATE AND ACETAMINOPHEN 2 TABLET: 5; 325 TABLET ORAL at 20:02

## 2018-09-06 RX ADMIN — TIZANIDINE 4 MG: 4 TABLET ORAL at 04:50

## 2018-09-06 RX ADMIN — TIZANIDINE 4 MG: 4 TABLET ORAL at 17:21

## 2018-09-06 RX ADMIN — DOCUSATE SODIUM 100 MG: 100 CAPSULE, LIQUID FILLED ORAL at 21:00

## 2018-09-06 RX ADMIN — Medication 10 ML: at 12:04

## 2018-09-06 RX ADMIN — DOCUSATE SODIUM 100 MG: 100 CAPSULE, LIQUID FILLED ORAL at 07:57

## 2018-09-06 RX ADMIN — ENOXAPARIN SODIUM 80 MG: 80 INJECTION SUBCUTANEOUS at 12:03

## 2018-09-06 RX ADMIN — TIZANIDINE 4 MG: 4 TABLET ORAL at 10:55

## 2018-09-06 RX ADMIN — HYDROCODONE BITARTRATE AND ACETAMINOPHEN 2 TABLET: 5; 325 TABLET ORAL at 07:57

## 2018-09-06 RX ADMIN — HYDROCODONE BITARTRATE AND ACETAMINOPHEN 2 TABLET: 5; 325 TABLET ORAL at 12:11

## 2018-09-06 RX ADMIN — SENNOSIDES 17.2 MG: 8.6 TABLET, FILM COATED ORAL at 07:57

## 2018-09-06 RX ADMIN — HYDROCODONE BITARTRATE AND ACETAMINOPHEN 2 TABLET: 5; 325 TABLET ORAL at 23:59

## 2018-09-06 RX ADMIN — HYDROCODONE BITARTRATE AND ACETAMINOPHEN 2 TABLET: 5; 325 TABLET ORAL at 16:12

## 2018-09-06 RX ADMIN — SENNOSIDES 17.2 MG: 8.6 TABLET, FILM COATED ORAL at 21:00

## 2018-09-06 RX ADMIN — POLYETHYLENE GLYCOL 3350 17 G: 17 POWDER, FOR SOLUTION ORAL at 07:57

## 2018-09-06 RX ADMIN — WARFARIN SODIUM 7.5 MG: 7.5 TABLET ORAL at 17:21

## 2018-09-06 RX ADMIN — OXYCODONE HYDROCHLORIDE 10 MG: 10 TABLET, FILM COATED, EXTENDED RELEASE ORAL at 09:46

## 2018-09-06 RX ADMIN — ENOXAPARIN SODIUM 80 MG: 80 INJECTION SUBCUTANEOUS at 23:58

## 2018-09-06 RX ADMIN — OXYCODONE HYDROCHLORIDE 10 MG: 10 TABLET, FILM COATED, EXTENDED RELEASE ORAL at 21:00

## 2018-09-06 RX ADMIN — Medication 10 ML: at 21:00

## 2018-09-06 ASSESSMENT — PAIN DESCRIPTION - LOCATION
LOCATION: LEG

## 2018-09-06 ASSESSMENT — PAIN DESCRIPTION - ORIENTATION
ORIENTATION: LEFT

## 2018-09-06 ASSESSMENT — PAIN SCALES - GENERAL
PAINLEVEL_OUTOF10: 4
PAINLEVEL_OUTOF10: 7
PAINLEVEL_OUTOF10: 8
PAINLEVEL_OUTOF10: 10
PAINLEVEL_OUTOF10: 10
PAINLEVEL_OUTOF10: 7
PAINLEVEL_OUTOF10: 6
PAINLEVEL_OUTOF10: 7
PAINLEVEL_OUTOF10: 10
PAINLEVEL_OUTOF10: 10
PAINLEVEL_OUTOF10: 7
PAINLEVEL_OUTOF10: 6

## 2018-09-06 ASSESSMENT — PAIN DESCRIPTION - PAIN TYPE
TYPE: SURGICAL PAIN

## 2018-09-06 NOTE — PROGRESS NOTES
Hospitalist Progress Note    Patient:  Corina Russell      Unit/Bed:7K-26/026-A    YOB: 1955    MRN: [id number]       Acct: [de-identified]     PCP: Amada Conrad MD    Date of Admission: 9/4/2018    Chief Complaint: History of DVTs, Post-Op Bridging Therapy     Hospital Course: Patient came to the hospital to have surgery on calcaneal fx. This was second surgery, first was on 8/25/2018. Pt fell off of a ladder. IM was consulted to bridge to Coumadin. On 9/5/2018 Pt's Hgb fell to 7.2, it was ordered to check H&H Q8H to monitor closely. At midnight on 9/6/18 Pt's Hgb dropped to 7.0. Subjective: Pt has no history of anemia. Through the records, the patient came into the hospital with a Hgb of 9. Pt is asymptomatic to the Hgb in the 7s. Pt denies SOB, CP, dizziness, HA, heart palpitations, hemoptysis, bruising. Medications:  Reviewed    Infusion Medications   Scheduled Medications    sodium chloride  250 mL Intravenous Once    enoxaparin  1 mg/kg Subcutaneous Q12H    oxyCODONE  10 mg Oral 2 times per day    senna  2 tablet Oral BID    docusate sodium  100 mg Oral BID    polyethylene glycol  17 g Oral Daily    sodium chloride flush  10 mL Intravenous 2 times per day     PRN Meds: HYDROcodone 5 mg - acetaminophen **OR** HYDROcodone 5 mg - acetaminophen, bisacodyl, naloxegol, sodium chloride flush, acetaminophen, ondansetron, HYDROmorphone, tiZANidine      Intake/Output Summary (Last 24 hours) at 09/06/18 1110  Last data filed at 09/06/18 0803   Gross per 24 hour   Intake             1320 ml   Output             1375 ml   Net              -55 ml       Diet:  DIET GENERAL;    Exam:  /66   Pulse 75   Temp 98.6 °F (37 °C) (Oral)   Resp 18   Ht 5' 4\" (1.626 m)   Wt 175 lb (79.4 kg)   SpO2 98%   BMI 30.04 kg/m²     General appearance: No apparent distress, appears stated age and cooperative. HEENT: Pupils equal, round, and reactive to light.  Conjunctivae/corneas clear, slightly pale looking. Neck: Supple, with full range of motion. No jugular venous distention. Trachea midline. Respiratory:  Normal respiratory effort. Clear to auscultation, bilaterally without Rales/Wheezes/Rhonchi. Cardiovascular: Regular rate and rhythm with normal S1/S2 without murmurs, rubs or gallops. Abdomen: Soft, non-tender, non-distended with normal bowel sounds. Musculoskeletal: passive and active ROM x 2 extremities. Both legs are wrapped in casts from surgery for fractures. Skin: Skin color, texture, turgor normal.  No rashes or lesions. Neurologic:  Neurovascularly intact without any focal sensory/motor deficits. Cranial nerves: II-XII intact, grossly non-focal.  Psychiatric: Alert and oriented, thought content appropriate, normal insight  Capillary Refill: Brisk,< 3 seconds   Peripheral Pulses: +2 palpable, equal bilaterally       Labs:   Recent Labs      09/05/18   0603  09/05/18   1403  09/06/18   0000  09/06/18   0900   WBC  7.7   --    --    --    HGB  7.2*  7.6*  7.0*  7.7*  7.7*   HCT  22.7*   --   21.7*  24.5*  24.5*   PLT  287   --    --    --      Recent Labs      09/05/18   0603  09/06/18   0900   NA  138  136   K  4.2  4.1   CL  102  98   CO2  23  23   BUN  15  12   CREATININE  1.1  1.0   CALCIUM  8.3*  8.8     No results for input(s): AST, ALT, BILIDIR, BILITOT, ALKPHOS in the last 72 hours. Recent Labs      09/05/18   0603  09/06/18   0606   INR  1.26*  1.34*     No results for input(s): Lenice Blitz in the last 72 hours. Urinalysis:    Lab Results   Component Value Date    NITRU NEGATIVE 08/24/2018    BLOODU NEGATIVE 08/24/2018    SPECGRAV 1.023 08/24/2018       Radiology:  XR ANKLE LEFT (2 VIEWS)   Final Result      Postsurgical changes from an ORIF of fractures within the distal left tibia and fibula. Please also refer to the operative report for further details. **This report has been created using voice recognition software.  It may contain minor errors which are inherent in voice recognition technology. **      Final report electronically signed by Dr. Nancy Ram on 9/5/2018 4:07 PM      FLUORO FOR SURGICAL PROCEDURES   Final Result          Diet: DIET GENERAL;    DVT prophylaxis: [x] Lovenox                                 [] SCDs                                 [] SQ Heparin                                 [] Encourage ambulation           [] Already on Anticoagulation     Disposition:    [x] Home       [] TCU       [] Rehab       [] Psych       [] SNF       [] [place]       [] Other-    Code Status: Full Code    PT/OT Eval Status: Active     Assessment/Plan:    Anticipated Discharge in : 1-2 days     Active Hospital Problems    Diagnosis Date Noted    Anemia [D64.9] 09/05/2018    Inadequate pain control [R52] 09/04/2018    H/O deep venous thrombosis [Z86.718] 08/28/2018       1. History of DVT   - Lovenox 1mg/kg BID to bridge to Coumadin    - Continue Coumadin and monitor INR    - DC Lovenox when INR is therapeutic   2. Anemia    - Hgb dropped from 7.2 yesterday, to 7.0 at midnight, then mehran to 7.7 at 9am this AM    - H&H Q8H    - Will continue to monitor. If Hgb drops below 7, the patient is aware of blood transfusion and DC of blood thinners.          Electronically signed by ALFONZO Spicer on 9/6/2018 at 11:10 AM

## 2018-09-06 NOTE — PROGRESS NOTES
Clinical Pharmacy Note    Warfarin consult follow-up    Recent Labs      09/06/18   0606   INR  1.34*     Recent Labs      09/05/18   0603  09/05/18   1403  09/06/18   0000  09/06/18   0900   HGB  7.2*  7.6*  7.0*  7.7*  7.7*   HCT  22.7*   --   21.7*  24.5*  24.5*   PLT  287   --    --    --        Current warfarin drug-drug interactions: enoxaparin        Date INR Warfarin Dose   9/5/2018 1.26 10 mg   9/6/2018  1.34   7.5 mg                                                Notes:                     Daily PT/INR until stable within therapeutic range.      Allen Miller, PharmD  9/6/2018  1:40 PM

## 2018-09-06 NOTE — PROGRESS NOTES
Progress Note  9/6/2018 8:48 AM  Subjective:   Admit Date: 9/4/2018  PCP: Cheryl Graves MD    9.6.18  Patient seen this morning at [facility] on behalf of Dr. Lucrecia Benites. Patient relates pain is moderately controlled since pop block has worn off. Reviewed H&H this morning with patient. Patient reports feeling fine and denies light headiness, SOB, fainting. Patient denies bright red blood in urine or stool. Denies abd pain. Denies F,C,N,V, SOB, CP    9.4.18  OR for ORIF of left fibular fx, pilon fracture, application of skin substitute graft, application of wound vac. 9.3.18   Patient seen at bedside this morning on behalf of Dr. Lucrecia Benites. He is s/p ORIF right calcaneus and closed reduction left pilon fracture with application of ex-fix and application of wound vac, left leg (DOS: 8.25.18) Patient resting comfortably. Patient scheduled for OR tomorrow (9.4.18) for ORIF of left pilon fracture. 9.2.18:  Patient seen at bedside this morning on behalf of Dr. Lucrecia Benites. He is s/p ORIF right calcaneus and closed reduction left pilon fracture with application of ex-fix and application of wound vac, left leg (DOS: 8.25.18) No new complaints. 9.1.18  Patient seen at bedside this morning on behalf of Dr. Lucrecia Benites. He is s/p ORIF right calcaneus and closed reduction left pilon fracture with application of ex-fix and application of wound vac, left leg (DOS: 8.25.18). Pain is well controlled with scheduled pain medications. Patient has no complaints or questions at this time. Denies any N/V/D/F/C/SOB/CP. 8.31.18  Patient is seen on behalf of Dr. Lucrecia Benites. He is s/p ORIF right calcaneus and closed reduction left pilon fracture with application of ex-fix and application of wound vac, left leg (DOS: 8.25.18). He was transferred to [facility] yesterday. He relates that he is working with PT/OT, and his pain is well controlled. No new complaints. 8.27.18   Patient seen at bedside this morning on behalf of Dr. Gregg Alvarado.  Patient is

## 2018-09-06 NOTE — PROGRESS NOTES
Spoke with [facility] Podiatry Resident, he stated they will change both VAC dressings on Friday. Will continue to follow.

## 2018-09-06 NOTE — CARE COORDINATION
9/6/18, 12:24 PM    DISCHARGE BARRIERS      Spoke with patient and care manager. Sadaf Herrera plans home with his wife, has other supportive family who can assist.  Equipment has been ordered. Requests [facility]. Will refer when ordered.

## 2018-09-06 NOTE — PROGRESS NOTES
EXTERNAL FIXATOR AND WOUND VAC PLACEMENT, RIGHT CALCANEOUS OPEN REDUCTION INTERNAL FIXATION performed by Maira Leone DPM at [facility]       Restrictions/Precautions:  Weight Bearing, General Precautions, Fall Risk     Right Lower Extremity Weight Bearing: Non Weight Bearing     Left Lower Extremity Weight Bearing: Non Weight Bearing        Other position/activity restrictions: wound vac L ankle, s/p ORIF Right Calcaneal Fracture, Layered Closure Right Skin laceration, 8/25 and ORIF LEFT TIBIAL PILON, EX-FIX REMOVAL LEFT TIBIAL BONE MARROW HARVEST, APPLICATION OF BONE GRAFT, PREPARATION OF GRAFT AND SITE, APPLICATION SKIN SUBSTITUTE GRAFT, APPLICATION OF WOUND VAC on 9/4. Prior Level of Function:  Ambulation Assistance: Independent  Transfer Assistance: Independent  Additional Comments: very active PTA, runs and bikes >50+ miles weekly    Subjective:  Chart Reviewed: Yes  Family / Caregiver Present: No  Comments: Pt is s/p ORIF LEFT TIBIAL PILON, EX-FIX REMOVAL LEFT TIBIAL BONE MARROW HARVEST, APPLICATION OF BONE GRAFT, PREPARATION OF GRAFT AND SITE, APPLICATION SKIN SUBSTITUTE GRAFT, APPLICATION OF WOUND VAC on 9/4. Subjective: Pt up in [facility] out in halls with friend helping to manage wound vac. Pt very pleasant and agreeable to therapy. Pain:  Yes.   Pain Assessment  Pain Level: 7  Pain Type: Surgical pain       Social/Functional:  Lives With: Spouse  Type of Home: House  Home Layout: Two level, Able to Live on Main level with bedroom/bathroom  Home Access: Stairs to enter without rails  Entrance Stairs - Number of Steps: 2 ADIS, ramp is being built  Home Equipment:  (none. )     Objective:  Sit to Supine: Supervision    Transfers  Bed to Chair: Stand by assistance (Without use of slideboard. )      Wheelchair Activities  Wheelchair Type: Standard  Wheelchair Cushion: Standard  Level of Assistance for pressure relief activities: Stand by assistance  Wheelchair Parts Management: Yes  Propulsion: Yes  Propulsion 1  Propulsion: Manual  Level: Level Tile  Method: LUE;RUE  Level of Assistance: Stand by assistance  Description/ Details: Pt demos good speed. Holds B LEs up off of ground. Notes fatigue once back in room  Distance: 500 feet x1     Exercises:  Exercises  Comments: Pt given HEP handout and completed BLE toe curls, BLE quad set, glut set, hip abd/add, heel slides, SLR all x15 reps to increase strength and improved mobility. Activity Tolerance:  Activity Tolerance: Patient Tolerated treatment well;Patient limited by fatigue    Assessment: Body structures, Functions, Activity limitations: Decreased functional mobility , Decreased endurance, Decreased strength  Assessment: Pt tolerated session very well. Very motivated. Would benefit from continued therapy at this time. Prognosis: Excellent     REQUIRES PT FOLLOW UP: Yes  Discharge Recommendations: Continue to assess pending progress, Patient would benefit from continued therapy after discharge    Patient Education:  Patient Education: HEP    Equipment Recommendations:  Equipment Needed: Yes  Mobility Devices: Wheelchair  Wheelchair: Standard  Other: slideboard    Safety:  Type of devices: All fall risk precautions in place, Call light within reach, Patient at risk for falls, Left in bed, Nurse notified    Plan:  Times per week: 6x O  Times per day: Daily  Specific instructions for Next Treatment: B LE strengthening, bed mobility, EOB core strengthening, transfer training, w/c mobility, car transfer  Current Treatment Recommendations: Strengthening, Balance Training, Functional Mobility Training, Home Exercise Program, Safety Education & Training, Patient/Caregiver Education & Training, Equipment Evaluation, Education, & procurement, Endurance Training, Transfer Training, Wheelchair Mobility Training    Goals:  Patient goals : To go home.     Short term goals  Time Frame for Short term goals: 3 days  Short term goal 1: Pt to transfer supine <-->

## 2018-09-06 NOTE — PROGRESS NOTES
8/25 and ORIF LEFT TIBIAL PILON, EX-FIX REMOVAL LEFT TIBIAL BONE MARROW HARVEST, APPLICATION OF BONE GRAFT, PREPARATION OF GRAFT AND SITE, APPLICATION SKIN SUBSTITUTE GRAFT, APPLICATION OF WOUND VAC on 9/4. Past Medical History:   Diagnosis Date    Embolism - blood clot     rt calf and groin    Hyperlipidemia      Past Surgical History:   Procedure Laterality Date    HIP SURGERY      lt    MA OFFICE/OUTPT VISIT,PROCEDURE ONLY Right 8/25/2018    OPEN REDUCTION INTERNAL FIXATION OF PILON FRACTURE WITH EXTERNAL FIXATOR AND WOUND VAC PLACEMENT, RIGHT CALCANEOUS OPEN REDUCTION INTERNAL FIXATION performed by Marleny Rabago DPM at [facility] 8  Chart Reviewed: Yes (medical chart review completed.  orders, image)  Patient assessed for rehabilitation services?: Yes    Subjective: Pt in bed upon OT arrival, agreeable to OT session    General:  Overall Orientation Status: Within Normal Limits    Vision: Impaired  Vision Exceptions: Wears glasses for reading    Hearing: Within functional limits         Pain:  Pain Assessment  Patient Currently in Pain: Yes       Social/Functional History:  Lives With: Spouse  Type of Home: House  Home Layout: Two level, Able to Live on Main level with bedroom/bathroom  Home Access: Stairs to enter without rails  Entrance Stairs - Number of Steps: 2 ADIS, ramp is being built  Home Equipment:  (none. )     Bathroom Shower/Tub: Tub/Shower unit  Bathroom Toilet: Standard (pt reports having no concerns wtih getting on /off toilet)  Bathroom Equipment: Tub transfer bench (will have tomorrow)       ADL Assistance: Independent  Homemaking Assistance: Independent       Ambulation Assistance: Independent  Transfer Assistance: Independent    Active : Yes  Mode of Transportation: Car  Occupation: Full time employment  Type of occupation:  in [place]  Additional Comments: very active PTA, runs and bikes >50+ miles weekly    Objective        Overall Cognitive Status: examination, clinical presentation, and decision making during this evaluation, this patient is of medium complexity. OT G-codes  Functional Limitation: Self care  Self Care Current Status (): At least 20 percent but less than 40 percent impaired, limited or restricted  Self Care Goal Status ():  At least 1 percent but less than 20 percent impaired, limited or restricted  AM-PAC Inpatient Daily Activity Raw Score: 20  AM-PAC Inpatient ADL T-Scale Score : 42.03  ADL Inpatient CMS 0-100% Score: 38.32  ADL Inpatient CMS G-Code Modifier : CJ

## 2018-09-06 NOTE — CARE COORDINATION
Discharge Planning Update Note: Sign RX for home wound VAC faxed to [employer]. Kalie updated. Aroldo Ghotra and his wife are requesting [facility] for wound VAC changes. Podiatry ordered equipment for home use - Wheelchair, Hospital bed and slider board. Ordered from [facility] per patient request. Planning possible discharge to home tomorrow.

## 2018-09-07 LAB
HCT VFR BLD CALC: 21.8 % (ref 42–52)
HCT VFR BLD CALC: 27.7 % (ref 42–52)
HEMOGLOBIN: 7 GM/DL (ref 14–18)
HEMOGLOBIN: 8.9 GM/DL (ref 14–18)
INR BLD: 1.64 (ref 0.85–1.13)

## 2018-09-07 PROCEDURE — 1200000000 HC SEMI PRIVATE

## 2018-09-07 PROCEDURE — 6370000000 HC RX 637 (ALT 250 FOR IP): Performed by: PHYSICIAN ASSISTANT

## 2018-09-07 PROCEDURE — 6370000000 HC RX 637 (ALT 250 FOR IP): Performed by: PODIATRIST

## 2018-09-07 PROCEDURE — P9016 RBC LEUKOCYTES REDUCED: HCPCS

## 2018-09-07 PROCEDURE — 85018 HEMOGLOBIN: CPT

## 2018-09-07 PROCEDURE — 2580000003 HC RX 258: Performed by: STUDENT IN AN ORGANIZED HEALTH CARE EDUCATION/TRAINING PROGRAM

## 2018-09-07 PROCEDURE — 6360000002 HC RX W HCPCS: Performed by: PHYSICIAN ASSISTANT

## 2018-09-07 PROCEDURE — 97530 THERAPEUTIC ACTIVITIES: CPT

## 2018-09-07 PROCEDURE — 99232 SBSQ HOSP IP/OBS MODERATE 35: CPT | Performed by: INTERNAL MEDICINE

## 2018-09-07 PROCEDURE — 85014 HEMATOCRIT: CPT

## 2018-09-07 PROCEDURE — 85610 PROTHROMBIN TIME: CPT

## 2018-09-07 PROCEDURE — 6370000000 HC RX 637 (ALT 250 FOR IP)

## 2018-09-07 PROCEDURE — 36415 COLL VENOUS BLD VENIPUNCTURE: CPT

## 2018-09-07 PROCEDURE — 36430 TRANSFUSION BLD/BLD COMPNT: CPT

## 2018-09-07 PROCEDURE — 97110 THERAPEUTIC EXERCISES: CPT

## 2018-09-07 PROCEDURE — 2580000003 HC RX 258: Performed by: FAMILY MEDICINE

## 2018-09-07 RX ORDER — OXYCODONE HCL 10 MG/1
10 TABLET, FILM COATED, EXTENDED RELEASE ORAL EVERY 12 HOURS
Qty: 14 TABLET | Refills: 0 | Status: SHIPPED | OUTPATIENT
Start: 2018-09-07 | End: 2018-09-14

## 2018-09-07 RX ORDER — CEFADROXIL 500 MG/1
500 CAPSULE ORAL 2 TIMES DAILY
Qty: 28 CAPSULE | Refills: 0 | Status: SHIPPED | OUTPATIENT
Start: 2018-09-07 | End: 2018-09-21

## 2018-09-07 RX ORDER — HYDROCODONE BITARTRATE AND ACETAMINOPHEN 5; 325 MG/1; MG/1
1 TABLET ORAL EVERY 4 HOURS PRN
Qty: 42 TABLET | Refills: 0 | Status: SHIPPED | OUTPATIENT
Start: 2018-09-07 | End: 2018-09-14

## 2018-09-07 RX ORDER — CYCLOBENZAPRINE HCL 5 MG
10 TABLET ORAL 3 TIMES DAILY PRN
Qty: 60 TABLET | Refills: 0 | Status: SHIPPED | OUTPATIENT
Start: 2018-09-07 | End: 2018-09-17

## 2018-09-07 RX ORDER — 0.9 % SODIUM CHLORIDE 0.9 %
250 INTRAVENOUS SOLUTION INTRAVENOUS ONCE
Status: COMPLETED | OUTPATIENT
Start: 2018-09-07 | End: 2018-09-07

## 2018-09-07 RX ORDER — WARFARIN SODIUM 7.5 MG/1
7.5 TABLET ORAL
Status: COMPLETED | OUTPATIENT
Start: 2018-09-07 | End: 2018-09-07

## 2018-09-07 RX ADMIN — Medication 10 ML: at 08:06

## 2018-09-07 RX ADMIN — DOCUSATE SODIUM 100 MG: 100 CAPSULE, LIQUID FILLED ORAL at 20:52

## 2018-09-07 RX ADMIN — ENOXAPARIN SODIUM 80 MG: 80 INJECTION SUBCUTANEOUS at 13:42

## 2018-09-07 RX ADMIN — DOCUSATE SODIUM 100 MG: 100 CAPSULE, LIQUID FILLED ORAL at 08:05

## 2018-09-07 RX ADMIN — HYDROCODONE BITARTRATE AND ACETAMINOPHEN 2 TABLET: 5; 325 TABLET ORAL at 04:07

## 2018-09-07 RX ADMIN — SENNOSIDES 17.2 MG: 8.6 TABLET, FILM COATED ORAL at 08:05

## 2018-09-07 RX ADMIN — HYDROCODONE BITARTRATE AND ACETAMINOPHEN 2 TABLET: 5; 325 TABLET ORAL at 13:38

## 2018-09-07 RX ADMIN — HYDROCODONE BITARTRATE AND ACETAMINOPHEN 2 TABLET: 5; 325 TABLET ORAL at 22:22

## 2018-09-07 RX ADMIN — TIZANIDINE 4 MG: 4 TABLET ORAL at 17:34

## 2018-09-07 RX ADMIN — Medication 10 ML: at 20:52

## 2018-09-07 RX ADMIN — HYDROCODONE BITARTRATE AND ACETAMINOPHEN 2 TABLET: 5; 325 TABLET ORAL at 08:05

## 2018-09-07 RX ADMIN — HYDROCODONE BITARTRATE AND ACETAMINOPHEN 2 TABLET: 5; 325 TABLET ORAL at 17:33

## 2018-09-07 RX ADMIN — OXYCODONE HYDROCHLORIDE 10 MG: 10 TABLET, FILM COATED, EXTENDED RELEASE ORAL at 08:05

## 2018-09-07 RX ADMIN — OXYCODONE HYDROCHLORIDE 10 MG: 10 TABLET, FILM COATED, EXTENDED RELEASE ORAL at 20:51

## 2018-09-07 RX ADMIN — ENOXAPARIN SODIUM 80 MG: 80 INJECTION SUBCUTANEOUS at 22:22

## 2018-09-07 RX ADMIN — POLYETHYLENE GLYCOL 3350 17 G: 17 POWDER, FOR SOLUTION ORAL at 08:05

## 2018-09-07 RX ADMIN — WARFARIN SODIUM 7.5 MG: 7.5 TABLET ORAL at 16:52

## 2018-09-07 RX ADMIN — SODIUM CHLORIDE 250 ML: 9 INJECTION, SOLUTION INTRAVENOUS at 03:17

## 2018-09-07 ASSESSMENT — PAIN DESCRIPTION - ORIENTATION
ORIENTATION: RIGHT;LEFT
ORIENTATION: LOWER;MID
ORIENTATION: RIGHT;LEFT
ORIENTATION: LOWER;MID

## 2018-09-07 ASSESSMENT — PAIN DESCRIPTION - PROGRESSION
CLINICAL_PROGRESSION: NOT CHANGED

## 2018-09-07 ASSESSMENT — PAIN SCALES - GENERAL
PAINLEVEL_OUTOF10: 7
PAINLEVEL_OUTOF10: 6
PAINLEVEL_OUTOF10: 4
PAINLEVEL_OUTOF10: 5
PAINLEVEL_OUTOF10: 5
PAINLEVEL_OUTOF10: 6
PAINLEVEL_OUTOF10: 4
PAINLEVEL_OUTOF10: 5
PAINLEVEL_OUTOF10: 3
PAINLEVEL_OUTOF10: 6
PAINLEVEL_OUTOF10: 5
PAINLEVEL_OUTOF10: 7
PAINLEVEL_OUTOF10: 7
PAINLEVEL_OUTOF10: 5
PAINLEVEL_OUTOF10: 4
PAINLEVEL_OUTOF10: 5

## 2018-09-07 ASSESSMENT — PAIN SCALES - WONG BAKER: WONGBAKER_NUMERICALRESPONSE: 2

## 2018-09-07 ASSESSMENT — PAIN DESCRIPTION - LOCATION
LOCATION: LEG
LOCATION: BACK
LOCATION: LEG
LOCATION: BACK

## 2018-09-07 ASSESSMENT — PAIN DESCRIPTION - FREQUENCY
FREQUENCY: CONTINUOUS

## 2018-09-07 ASSESSMENT — PAIN DESCRIPTION - ONSET
ONSET: ON-GOING

## 2018-09-07 ASSESSMENT — PAIN DESCRIPTION - PAIN TYPE
TYPE: ACUTE PAIN;SURGICAL PAIN

## 2018-09-07 ASSESSMENT — PAIN DESCRIPTION - DESCRIPTORS
DESCRIPTORS: ACHING

## 2018-09-07 NOTE — PROGRESS NOTES
a 10/10 with pain worse in the left leg. Diet: DIET GENERAL;  Medications:   Scheduled Meds:   sodium chloride  250 mL Intravenous Once    enoxaparin  1 mg/kg Subcutaneous Q12H    warfarin (COUMADIN) daily dosing (placeholder)   Other RX Placeholder    oxyCODONE  10 mg Oral 2 times per day    senna  2 tablet Oral BID    docusate sodium  100 mg Oral BID    polyethylene glycol  17 g Oral Daily    sodium chloride flush  10 mL Intravenous 2 times per day     Continuous Infusions:    PRN Medications: HYDROcodone 5 mg - acetaminophen **OR** HYDROcodone 5 mg - acetaminophen, sodium chloride flush, acetaminophen, ondansetron, HYDROmorphone, tiZANidine    Objective:   Vitals: BP (!) 104/58   Pulse 75   Temp 99 °F (37.2 °C) (Oral)   Resp 15   Ht 5' 4\" (1.626 m)   Wt 175 lb (79.4 kg)   SpO2 97%   BMI 30.04 kg/m²   BMI: Body mass index is 30.04 kg/m². CBC:   Recent Labs      09/05/18   0603   09/06/18   1605  09/07/18   0044  09/07/18   0805   WBC  7.7   --    --    --    --    HGB  7.2*   < >  8.0*  7.0*  8.9*   PLT  287   --    --    --    --     < > = values in this interval not displayed. BMP:    Recent Labs      09/05/18   0603  09/06/18   0900   NA  138  136   K  4.2  4.1   CL  102  98   CO2  23  23   BUN  15  12   CREATININE  1.1  1.0   GLUCOSE  117*  132*     Hepatic:   No results for input(s): AST, ALT, ALB, BILITOT, ALKPHOS in the last 72 hours. Troponin: No results for input(s): TROPONINI in the last 72 hours. BNP: No results for input(s): BNP in the last 72 hours. Lipids: No results for input(s): CHOL, HDL in the last 72 hours.     Invalid input(s): LDLCALCU  INR:   Recent Labs      09/05/18   0603  09/06/18   0606  09/07/18   0805   INR  1.26*  1.34*  1.64*       Physical Exam:  General Appearance: alert and oriented to person, place and time, in no acute distress  Pulmonary/Chest: normal air movement, no respiratory distress  Abdomen: soft, non-tender, non-distended  Extremities: no cyanosis, clubbing or edema, pulse   Skin: warm and dry, no rash or erythema  Head: normocephalic and atraumatic  Eyes: pupils equal, round, and reactive to light  Neck: supple and non-tender without mass, no thyromegaly   Neurological: alert, oriented, normal speech, no focal findings or movement disorder noted     LOWER EXTREMITY:  Vascular: CFT brisk to exposed digits. Mild erythema noted to right posterior heel incision, improved.     Dermatologic:   Right leg incision edges are well coapted. Sutures intact. No areas of dehiscence. No drainage. Wound vac intact to LLE, functioning properly.      Neurovascular: Light touch sensation grossly intact at the digits bilaterally. Patient able to dorsiflex and plantarflex toes, shannon.       Musculoskeletal: Muscle strength deferred. Foot and ankle in rectus alignment, shannon.     9.7.18:            9.6. 18: Assessment and Plan:   Patient Active Problem List:     Anticoagulated on Coumadin     Fall     Displaced intraarticular fracture of right calcaneus, initial encounter for open fracture     Open left tibial fracture     Open fracture of distal end of left fibula     Accidental fall from ladder     ELLEN (acute kidney injury) ([facility] [address]    PLAN:     1. s/p ORIF right calcaneus and ORIF left pilon fracture with removal of ex-fix and application of wound vac, left leg.   -Continue with current pain regimen  -Continue with IV antibiotics, ID following  -Dressing to right leg changed using adaptic, betadine, gauze, abdominal pad, kerlix, cast padding, splint and ace wrap. Left leg dressing/wound vac left intact.  -Continue strict bedrest, continue with PT/OT, upper extremity activities as tolerated   -Discuss with patient that limited light touch sensation to left toes are due to pop block. - Podiatry will continue following.  - Wound vac paper work signed. DME as OP ordered.  Given pt's mobility restrictions wheel chair, hospital bed and slider board is

## 2018-09-07 NOTE — PROGRESS NOTES
Final Result      Postsurgical changes from an ORIF of fractures within the distal left tibia and fibula. Please also refer to the operative report for further details. **This report has been created using voice recognition software. It may contain minor errors which are inherent in voice recognition technology. **      Final report electronically signed by Dr. Melany Soler on 9/5/2018 4:07 PM      FLUORO FOR SURGICAL PROCEDURES   Final Result          Diet: DIET GENERAL;    DVT prophylaxis: [x] Lovenox                                 [] SCDs                                 [] SQ Heparin                                 [] Encourage ambulation           [] Already on Anticoagulation     Disposition:    [x] Home       [] TCU       [] Rehab       [] Psych       [] SNF       [] [place]       [] Other-    Code Status: Full Code    PT/OT Eval Status: Active     Assessment/Plan:    Anticipated Discharge in : 1-2 days     Active Hospital Problems    Diagnosis Date Noted    Anemia [D64.9] 09/05/2018    Inadequate pain control [R52] 09/04/2018    H/O deep venous thrombosis [Z86.718] 08/28/2018       1.  History of DVT   - Lovenox 1mg/kg BID to bridge to Coumadin    - Continue Coumadin and monitor INR    - DC Lovenox when INR is therapeutic   2.acute on chronic Anemia - not due to blood loss   - Hgb apparently dropped to 7 in the middle of night and was ordered a unit of transfusion recheck hemoglobin after transfusion is close to 9 which seems to be appropriate only if the pretransfusion hemoglobin was around 7.9- we'll recheck hemoglobin before attempting transfusion next time   Continue anticoagulantfor now his hemoglobin seems to be staying around's 7.5-8 now posttransfusion around 8.9  If the hbg rechecks are less than 7 anticoagulants has to be held and IVC filter needs to be placed ,however it does not seem to be the case right now    Can be discharged on  Lovenox full dose shots, and Coumadin  Medically stable  Will sign off    Electronically signed by Arianne Moraes MD on 9/7/2018 at 3:20 PM

## 2018-09-07 NOTE — PROGRESS NOTES
Tong Mariee 61  INPATIENT OCCUPATIONAL THERAPY  [facility] ORTHOPEDICS 7K  DAILY NOTE    Time:  Time In: 1410  Time Out: 1438  Timed Code Treatment Minutes: 28 Minutes  Minutes: 28          Date: 2018  Patient Name: Gisel Rosenthal,   Gender: male      Room: Counts include 234 beds at the [facility]26/026-A  MRN: [id number]  : 1955  (59 y.o.)  Referring Practitioner: Dr. Jimbo Martin   Diagnosis: inadequate pain control   Additional Pertinent Hx: Pt is readmitted s/p additional surgery on L LE yesterday () to remove external fixator and complete ORIF of Left LE. Pt was recently on TCU recovering from initial surgeries completed on . Per initial ED note, pt is a 59year old male presenting to the Emergency Department via EMS for evaluation of potential injuries sustained in a fall from a ladder this evening. He reports that he was taking down a wreath when he fell from the ladder, landing directly onto his feet. He denies hitting his head or losing consciousness. Denies headache, visual changes, paresthesias, nausea or vomiting. Had immediate excruciating bilateral ankle pain that has subsided slightly after fentanyl administration in EMS. Presents to the ER with bilateral air splints from the EMS to his ankles/lower legs. Pulses are palpable. Wound noted to right posterior ankle/heel with no obvious bone protruding. Left medial ankle noted to have wound with distal end of tibia protruding. Pt sustained Right open calcaneus fracture & open left tibial and fibula fracture. Pt is s/p ORIF Right Calcaneal Fracture, Layered Closure Right Skin laceration, ORIF Left Pilon Fracture with External fixator application .       Past Medical History:   Diagnosis Date    Embolism - blood clot     rt calf and groin    Hyperlipidemia      Past Surgical History:   Procedure Laterality Date    HIP SURGERY      lt    [place] OFFICE/OUTPT VISIT,PROCEDURE ONLY Right 2018    OPEN REDUCTION INTERNAL FIXATION OF PILON FRACTURE WITH EXTERNAL FIXATOR AND WOUND VAC PLACEMENT, RIGHT CALCANEOUS OPEN REDUCTION INTERNAL FIXATION performed by Yee Rankin DPM at [facility]       Restrictions/Precautions:  Weight Bearing, General Precautions, Fall Risk     Right Lower Extremity Weight Bearing: Non Weight Bearing     Left Lower Extremity Weight Bearing: Non Weight Bearing        Other position/activity restrictions: wound vac L ankle, s/p ORIF Right Calcaneal Fracture, Layered Closure Right Skin laceration, 8/25 and ORIF LEFT TIBIAL PILON, EX-FIX REMOVAL LEFT TIBIAL BONE MARROW HARVEST, APPLICATION OF BONE GRAFT, PREPARATION OF GRAFT AND SITE, APPLICATION SKIN SUBSTITUTE GRAFT, APPLICATION OF WOUND VAC on 9/4. Prior Level of Function:  ADL Assistance: Independent  Homemaking Assistance: Independent  Ambulation Assistance: Independent  Transfer Assistance: Independent  Additional Comments: very active PTA, runs and bikes >50+ miles weekly    Subjective       Subjective: Pt in bed upon OT arrival, agreeable to OT session    Overall Orientation Status: Within Normal Limits         Pain:  Pain Assessment  Pain Assessment: Faces  Montalvo-Baker Pain Rating: Hurts a little bit       Objective  Overall Cognitive Status: WNL            Bed mobility  Supine to Sit: Modified independent  Sit to Supine: Modified independent  Scooting: Modified independent    Transfers  Transfer Comments: Lateral Transfer: EOB to w/c, w/c to EOB, S for both t/fs, cues for managing lines       Balance  Sitting Balance: Modified independent            Functional Mobility  Functional - Mobility Device: Wheelchair  Activity: Other  Assist Level: Supervision  Functional Mobility Comments: within unit, min cues for navigating in tight spaces                         Additional Activities: Discussed home safety with pt and spouse. Discussed with pt need for [place] to evaulate home barriers and further safety concerns. Biggest concern with pt is returning to activity quicker than advised.

## 2018-09-07 NOTE — PROGRESS NOTES
09/05/18   0603   09/06/18   1605  09/07/18   0044  09/07/18   0805   WBC  7.7   --    --    --    --    HGB  7.2*   < >  8.0*  7.0*  8.9*   PLT  287   --    --    --    --     < > = values in this interval not displayed. BMP:    Recent Labs      09/05/18   0603  09/06/18   0900   NA  138  136   K  4.2  4.1   CL  102  98   CO2  23  23   BUN  15  12   CREATININE  1.1  1.0   GLUCOSE  117*  132*     Calcium:  Recent Labs      09/06/18   0900   CALCIUM  8.8   INR:   Recent Labs      09/05/18   0603  09/06/18   0606  09/07/18   0805   INR  1.26*  1.34*  1.64*       Problem list of patient:     Patient Active Problem List   Diagnosis Code    Anticoagulated on Coumadin Z51.81, Z79.01    Fall W19. Sallye Burows    Displaced intraarticular fracture of right calcaneus, initial encounter for open fracture S92.061B    Open left tibial fracture S82.202B    Open fracture of distal end of left fibula S82.832B    Accidental fall from ladder W11. XXXA    ELLEN (acute kidney injury) ([facility] [address] N17.9    H/O deep venous thrombosis Z86.718    Acute blood loss as cause of postoperative anemia D62    Accidental fall from ladder, initial encounter W11. XXXA    Anxiety about blushing F41.9    Inadequate pain control R52    Anemia D64.9         ASSESSMENT/PLAN   Fall with open fracture left tibia /fibula and right calcaneous: had surgery. Low grade fever:better  Ok with discharge plan  I will see him as needed.     aMrtha Newberry MD, FACP 9/7/2018 2:10 PM

## 2018-09-08 VITALS
HEART RATE: 83 BPM | TEMPERATURE: 98 F | SYSTOLIC BLOOD PRESSURE: 118 MMHG | OXYGEN SATURATION: 97 % | RESPIRATION RATE: 18 BRPM | WEIGHT: 175 LBS | HEIGHT: 64 IN | DIASTOLIC BLOOD PRESSURE: 66 MMHG | BODY MASS INDEX: 29.88 KG/M2

## 2018-09-08 LAB
HCT VFR BLD CALC: 28.4 % (ref 42–52)
HEMOGLOBIN: 9.1 GM/DL (ref 14–18)
INR BLD: 2 (ref 0.85–1.13)

## 2018-09-08 PROCEDURE — 6360000002 HC RX W HCPCS: Performed by: PHYSICIAN ASSISTANT

## 2018-09-08 PROCEDURE — 85018 HEMOGLOBIN: CPT

## 2018-09-08 PROCEDURE — 2580000003 HC RX 258: Performed by: STUDENT IN AN ORGANIZED HEALTH CARE EDUCATION/TRAINING PROGRAM

## 2018-09-08 PROCEDURE — 85610 PROTHROMBIN TIME: CPT

## 2018-09-08 PROCEDURE — 85014 HEMATOCRIT: CPT

## 2018-09-08 PROCEDURE — 6370000000 HC RX 637 (ALT 250 FOR IP): Performed by: PODIATRIST

## 2018-09-08 PROCEDURE — 36415 COLL VENOUS BLD VENIPUNCTURE: CPT

## 2018-09-08 RX ORDER — WARFARIN SODIUM 5 MG/1
5 TABLET ORAL ONCE
Status: DISCONTINUED | OUTPATIENT
Start: 2018-09-08 | End: 2018-09-08 | Stop reason: HOSPADM

## 2018-09-08 RX ADMIN — ENOXAPARIN SODIUM 80 MG: 80 INJECTION SUBCUTANEOUS at 11:40

## 2018-09-08 RX ADMIN — Medication 10 ML: at 08:30

## 2018-09-08 RX ADMIN — HYDROCODONE BITARTRATE AND ACETAMINOPHEN 2 TABLET: 5; 325 TABLET ORAL at 06:26

## 2018-09-08 RX ADMIN — HYDROCODONE BITARTRATE AND ACETAMINOPHEN 2 TABLET: 5; 325 TABLET ORAL at 02:35

## 2018-09-08 RX ADMIN — SENNOSIDES 17.2 MG: 8.6 TABLET, FILM COATED ORAL at 08:30

## 2018-09-08 RX ADMIN — DOCUSATE SODIUM 100 MG: 100 CAPSULE, LIQUID FILLED ORAL at 08:29

## 2018-09-08 RX ADMIN — POLYETHYLENE GLYCOL 3350 17 G: 17 POWDER, FOR SOLUTION ORAL at 08:29

## 2018-09-08 RX ADMIN — HYDROCODONE BITARTRATE AND ACETAMINOPHEN 2 TABLET: 5; 325 TABLET ORAL at 11:41

## 2018-09-08 RX ADMIN — OXYCODONE HYDROCHLORIDE 10 MG: 10 TABLET, FILM COATED, EXTENDED RELEASE ORAL at 08:29

## 2018-09-08 ASSESSMENT — PAIN DESCRIPTION - PAIN TYPE: TYPE: SURGICAL PAIN

## 2018-09-08 ASSESSMENT — PAIN SCALES - GENERAL
PAINLEVEL_OUTOF10: 5
PAINLEVEL_OUTOF10: 5
PAINLEVEL_OUTOF10: 4
PAINLEVEL_OUTOF10: 5
PAINLEVEL_OUTOF10: 4

## 2018-09-08 ASSESSMENT — PAIN DESCRIPTION - PROGRESSION: CLINICAL_PROGRESSION: NOT CHANGED

## 2018-09-08 ASSESSMENT — PAIN DESCRIPTION - ONSET: ONSET: ON-GOING

## 2018-09-08 ASSESSMENT — PAIN DESCRIPTION - ORIENTATION: ORIENTATION: LEFT;RIGHT

## 2018-09-08 ASSESSMENT — PAIN DESCRIPTION - DESCRIPTORS: DESCRIPTORS: ACHING

## 2018-09-08 ASSESSMENT — PAIN DESCRIPTION - LOCATION: LOCATION: LEG

## 2018-09-08 ASSESSMENT — PAIN DESCRIPTION - FREQUENCY: FREQUENCY: CONTINUOUS

## 2018-09-08 NOTE — DISCHARGE INSTR - MEDS
Clinical Pharmacy Note                                               Warfarin Discharge Recommendations        Pt discharged from [address] [place] [address] today after admission for Fall     INR today:      Recent Labs      09/08/18   0553   INR  2.00*         Coumadin 5 mg tabs     Interacting medications at discharge: none  Date INR Warfarin Dose   9/5/2018 1.26 10 mg   9/6/2018  1.34   7.5 mg    9/7/2018  1.64 7.5 mg (in hospital)      Recommendations for discharge:   Date Warfarin Dose   9/8/18 5 mg   9/9/18 5 mg   9/10/18 7.5 mg   9/11/18 INR draw      Recheck INR:  9/11/18  with results to Hai Short

## 2018-09-08 NOTE — PLAN OF CARE
Problem: Falls - Risk of:  Goal: Will remain free from falls  Will remain free from falls   Outcome: Met This Shift  Patient has not had any falls during this shift. Bed in lowest position with wheels locked and call light in reach. Patient compliant with using call light to request assistance from staff when needed. Fall prevention measures in place and patient compliant. Hourly rounding in place and bed alarm on. Goal: Absence of physical injury  Absence of physical injury   Outcome: Completed Date Met: 09/04/18  No accidental physical injury to patient this shift. Bed in lowest position with wheels locked and call light in reach. Problem: Risk for Impaired Skin Integrity  Goal: Tissue integrity - skin and mucous membranes  Structural intactness and normal physiological function of skin and  mucous membranes. Outcome: Ongoing  Patient with surgical incisions to bilateral lower extremities. MARLINE due to dressings. Dressings over incisions remain dry and intact without any drainage noted. No other skin issues noted to patient this shift. Patient is able to reposition himself in bed in order to prevent skin breakdown. Will continue to monitor. Problem: Pain Control  Goal: Maintain pain level at or below patient's acceptable level (or 5 if patient is unable to determine acceptable level)  Outcome: Ongoing  Patient verbalizing the start of pain in his left thigh. Patient had block done to LLE, so sensation is decreased. Patient taking percocet every four hours PRN for pain and zanaflex every six hours PRN for spasms. Patient voices relief of pain with PO pain medications. Both legs elevated for comfort. Will continue to assess patient for pain and administer pain medications as appropriate. Problem: Cardiovascular  Goal: No DVT, peripheral vascular complications  Outcome: Met This Shift  No signs or symptoms of DVT noted to patient this shift. Negative Jennifer's sign bialterally.  No redness, swelling,
Problem: Falls - Risk of:  Goal: Will remain free from falls  Will remain free from falls   Outcome: Met This Shift  Patient has not had any falls during this shift. Bed in lowest position with wheels locked and call light in reach. Patient compliant with using call light to request assistance from staff when needed. Fall prevention measures in place and patient compliant. Hourly rounding in place and bed alarm on. Problem: Risk for Impaired Skin Integrity  Goal: Tissue integrity - skin and mucous membranes  Structural intactness and normal physiological function of skin and  mucous membranes. Outcome: Ongoing  Patient with surgical incisions to bilateral ankles. MARLINE due to dressings. Dressings over incisions are dry and intact without any drainage noted. No other skin issues noted to patient this shift. Patient is able to reposition himself in bed in order to prevent skin breakdown. Will continue to monitor. Problem: Pain Control  Goal: Maintain pain level at or below patient's acceptable level (or 5 if patient is unable to determine acceptable level)  Outcome: Ongoing  Patient with stated pain goal of 5/10. Patient verbalizing pain of 10/10 to right ankle. Patient taking dilaudid every three hours PRN, norco every four hours PRN, and zanaflex every 6 hours PRN. Patient also has oxy ER scheduled BID. Patient denies relief of pain with IV and PO pain medications. Periods of rest noted after administering pain medications. Patient is able to reposition himself in bed PRN for comfort and ice applied to LLE. Will continue to assess patient for pain and administer pain medications as appropriate. Problem: Cardiovascular  Goal: No DVT, peripheral vascular complications  Outcome: Met This Shift  No signs or symptoms of DVT noted to patient this shift. MARLINE bilateral calves for swelling, redness, or warmth due to splints. Patient denies pain in bilateral calves. Patient on lovenox BID. Will monitor.
Problem: Falls - Risk of:  Goal: Will remain free from falls  Will remain free from falls   Outcome: Ongoing  No falls this shift. Problem: Risk for Impaired Skin Integrity  Goal: Tissue integrity - skin and mucous membranes  Structural intactness and normal physiological function of skin and  mucous membranes. Outcome: Ongoing  No skin breakdown noted. Dressing to BLE intact. Problem: Pain Control  Goal: Maintain pain level at or below patient's acceptable level (or 5 if patient is unable to determine acceptable level)  Outcome: Ongoing  Patient continues to take norco for pain with relief. Problem: Cardiovascular  Goal: No DVT, peripheral vascular complications  Outcome: Ongoing  No s/s of dvt. scds in use. Problem: GI  Goal: No bowel complications  Outcome: Ongoing  bm this shift. Problem: Nutrition  Goal: Optimal nutrition therapy  Outcome: Ongoing  Adequate appetite this shift. Problem: Musculor/Skeletal Functional Status  Goal: Highest potential functional level  Outcome: Ongoing  Patient working with PT/OT. Problem: Daily Care:  Goal: Daily care needs are met  Daily care needs are met   Outcome: Ongoing  Patient participates in adls. Problem: Discharge Planning:  Goal: Patients continuum of care needs are met  Patients continuum of care needs are met   Outcome: Ongoing  Discharge to home when stable. Problem: DISCHARGE BARRIERS  Goal: Patient's continuum of care needs are met  Outcome: Ongoing  Discharge to home when stable. Comments: Care plan reviewed with patient and family. Patient and family verbalize understanding of the plan of care and contribute to goal setting.
Problem: Pain Control  Goal: Maintain pain level at or below patient's acceptable level (or 5 if patient is unable to determine acceptable level)  Outcome: Ongoing  Patient uses repositioning and medication to control his pain    Problem: Cardiovascular  Goal: No DVT, peripheral vascular complications  Outcome: Ongoing  Patient has clotting history. Receiving lovenox and coumadin     Problem: GI  Goal: No bowel complications  Outcome: Ongoing  Patient is taking a stool softener to prevent bowel complications. Problem: Nutrition  Goal: Optimal nutrition therapy  Outcome: Ongoing      Problem: Daily Care:  Goal: Daily care needs are met  Daily care needs are met   Outcome: Ongoing  Patient is rounded on hourly to address his needs     Comments: Care plan reviewed with patient. Patient verbalize understanding of the plan of care and contribute to goal setting.
Problem: Pain Control  Goal: Maintain pain level at or below patient's acceptable level (or 5 if patient is unable to determine acceptable level)  Patient is using medication to control his pain at this time. Problem: Cardiovascular  Goal: No DVT, peripheral vascular complications  Outcome: Ongoing  Patient is using medication to prevent DVT formation     Problem: GI  Goal: No bowel complications  Outcome: Ongoing  Patient is taking stool softeners to prevent bowel complications. Problem: Nutrition  Goal: Optimal nutrition therapy  Outcome: Ongoing  Patient has a good appetite and is finishing his meals. Problem: Daily Care:  Goal: Daily care needs are met  Daily care needs are met   Outcome: Ongoing  Patient is rounded on hourly to ensure needs are addressed. Problem: DISCHARGE BARRIERS  Goal: Patient's continuum of care needs are met  Outcome: Ongoing  Patient plans to go [facility] his [facility]. Comments: Care plan reviewed with patient. Patient  verbalize understanding of the plan of care and contribute to goal setting.
off today. Pain goal 5. Patient rates his pain as high as a 10. Pain medication adjusted today. Pain goal 5. Comments: Care plan reviewed with patient. Patient verbalize understanding of the plan of care and contribute to goal setting.

## 2018-09-08 NOTE — PROGRESS NOTES
Progress Note  9/8/2018 9:56 AM  Subjective:   Admit Date: 9/4/2018  PCP: Dakota Arenas MD    Interval History:     9.8.18:  Patient is seen bedside on behalf of Dr. Boris Jackson. He relates that his pain continues to be well controlled. He states that he has everything he needs at home in preparation for discharge. Home health is set up in preparation for discharge. No new complaints. 9.7.18:  Patient seen this morning on behalf of Dr. Boris Jackson. Patient states his pain is well controlled at this time with scheduled pain medications. Patient states he would like to go home today. Patient states he has received a unit of blood with no issues. Denies any N/V/D/F/C/SOB/CP.    9.6.18  Patient seen this morning at [facility] on behalf of Dr. Boris Jackson. Patient relates pain is moderately controlled since pop block has worn off. Reviewed H&H this morning with patient. Patient reports feeling fine and denies light headiness, SOB, fainting. Patient denies bright red blood in urine or stool. Denies abd pain. Denies F,C,N,V, SOB, CP     9.4.18  OR for ORIF of left fibular fx, pilon fracture, application of skin substitute graft, application of wound vac. 9.3.18   Patient seen at bedside this morning on behalf of Dr. Boris Jackson. He is s/p ORIF right calcaneus and closed reduction left pilon fracture with application of ex-fix and application of wound vac, left leg (DOS: 8.25.18) Patient resting comfortably. Patient scheduled for OR tomorrow (9.4.18) for ORIF of left pilon fracture. 9.2.18:  Patient seen at bedside this morning on behalf of Dr. Boris Jackson. He is s/p ORIF right calcaneus and closed reduction left pilon fracture with application of ex-fix and application of wound vac, left leg (DOS: 8.25.18) No new complaints. 9.1.18  Patient seen at bedside this morning on behalf of Dr. Boris Jackson.  He is s/p ORIF right calcaneus and closed reduction left pilon fracture with application of ex-fix and application of wound vac, left leg (DOS: 8.25.18). Pain is well controlled with scheduled pain medications. Patient has no complaints or questions at this time. Denies any N/V/D/F/C/SOB/CP. 8.31.18  Patient is seen on behalf of Dr. Lucrecia eBnites. He is s/p ORIF right calcaneus and closed reduction left pilon fracture with application of ex-fix and application of wound vac, left leg (DOS: 8.25.18). He was transferred to [facility] yesterday. He relates that he is working with PT/OT, and his pain is well controlled. No new complaints. 8.27.18   Patient seen at bedside this morning on behalf of Dr. Gregg Alvarado. Patient is 2 days s/p ORIF right calcaneus and closed reduction left pilon fracture with application of ex-fix and application of wound vac, left leg (DOS: 8.25.18). Patient reports pain is well controlled with combination of Tramadol and Percocet. Patient and family express concern about discharge planning and would like to explore options for rehab/nursing facility. Patient states he worked with PT/OT yesterday on maneuvering in the bed and arm exercises with a resistance band. Patient denies F/V/N/C/SOB/CP.     8.26.18  Patient seen at bedside this morning on behalf of Dr. Lucrecia Benites. Patient is 1 day s/p ORIF right calcaneus and closed reduction left pilon fracture with application of ex-fix and application of wound vac, left leg. Patient reports that his pain is currently well controlled and he slept well overnight. Patient has not had BM since surgery, but is tolerating a diet and is passing flatus. Patient denies calf pain. Patient denies F/V/N/C/SOB/CP. Patient reports numbness and tingling in both feet. No other complaints at this time. HISTORY OF PRESENT ILLNESS:    The patient is a 59 y.o. male with significant past medical history of DVT who presents with bilateral open fractures. Patient states that he was climbing a ladder around 9pm this evening and fell from a height of 10-12 feet.  Patient states that he fell directly on his feet onto concrete. Patient denies any other trauma, patient denies LOC. Patient was alone at the time of the injury and yelled for help, patient states a neighbor came to his aid within a few minutes. Patient was then treated by EMS and brought to the ED. Patient reports pain is a 10/10 with pain worse in the left leg. Diet: DIET GENERAL;  Medications:   Scheduled Meds:   sodium chloride  250 mL Intravenous Once    enoxaparin  1 mg/kg Subcutaneous Q12H    warfarin (COUMADIN) daily dosing (placeholder)   Other RX Placeholder    oxyCODONE  10 mg Oral 2 times per day    senna  2 tablet Oral BID    docusate sodium  100 mg Oral BID    polyethylene glycol  17 g Oral Daily    sodium chloride flush  10 mL Intravenous 2 times per day     Continuous Infusions:    PRN Medications: HYDROcodone 5 mg - acetaminophen **OR** HYDROcodone 5 mg - acetaminophen, sodium chloride flush, acetaminophen, ondansetron, HYDROmorphone, tiZANidine    Objective:   Vitals: /66   Pulse 83   Temp 98 °F (36.7 °C) (Oral)   Resp 18   Ht 5' 4\" (1.626 m)   Wt 175 lb (79.4 kg)   SpO2 97%   BMI 30.04 kg/m²   BMI: Body mass index is 30.04 kg/m². CBC:   Recent Labs      09/07/18   0044  09/07/18   0805 09/08/18   0553   HGB  7.0*  8.9*  9.1*     BMP:    Recent Labs      09/06/18   0900   NA  136   K  4.1   CL  98   CO2  23   BUN  12   CREATININE  1.0   GLUCOSE  132*     Hepatic:   No results for input(s): AST, ALT, ALB, BILITOT, ALKPHOS in the last 72 hours. Troponin: No results for input(s): TROPONINI in the last 72 hours. BNP: No results for input(s): BNP in the last 72 hours. Lipids: No results for input(s): CHOL, HDL in the last 72 hours.     Invalid input(s): LDLCALCU  INR:   Recent Labs      09/06/18   0606  09/07/18   0805  09/08/18   0553   INR  1.34*  1.64*  2.00*       Physical Exam:  General Appearance: alert and oriented to person, place and time, in no acute distress  Pulmonary/Chest: normal air movement, no respiratory distress  Abdomen: soft, non-tender, non-distended  Extremities: no cyanosis, clubbing or edema, pulse   Skin: warm and dry, no rash or erythema  Head: normocephalic and atraumatic  Eyes: pupils equal, round, and reactive to light  Neck: supple and non-tender without mass, no thyromegaly   Neurological: alert, oriented, normal speech, no focal findings or movement disorder noted     LOWER EXTREMITY:  Vascular:   CFT brisk to exposed digits.      Dermatologic:   Dressing left intact  Wound vac intact to LLE, functioning properly.      Neurovascular:  Light touch sensation grossly intact at the digits bilaterally. Patient able to dorsiflex and plantarflex toes, shannon.       Musculoskeletal:   Muscle strength deferred. Foot and ankle in rectus alignment, shannon. Assessment and Plan:   Patient Active Problem List:     Anticoagulated on Coumadin     Fall     Displaced intraarticular fracture of right calcaneus, initial encounter for open fracture     Open left tibial fracture     Open fracture of distal end of left fibula     Accidental fall from ladder     ELLEN (acute kidney injury) ([facility] [address]    PLAN:     1. s/p ORIF right calcaneus and ORIF left pilon fracture with removal of ex-fix and application of wound vac, left leg.   -Continue with current pain regimen  -Continue with IV antibiotics, ID following  -Dressing to right leg left intac  - Left leg dressing/wound vac left intact.  -Continue strict bedrest, continue with PT/OT, upper extremity activities as tolerated   -Discuss with patient that limited light touch sensation to left toes are due to pop block. - Podiatry will continue following.  - Wound vac paper work signed. DME as OP ordered. Given pt's mobility restrictions wheel chair, hospital bed and slider board is needed.      2. History of DVT   - labs reviewed  - IM following and bridging. Currently holding anti-coags due to low H&H     3.  Anemia  - surgical dressings changed and wound vac evaluated, drop in H&H unlikely from surgical wounds,  - IM following. 1 unit given, hemoglobin 8.9 this morning.  - INR 1.64 at this time     Dispo:   Discharge home today. Prescriptions for discharge are in the chart. Will follow up with Dr. Bernhard Aase as outpatient. Electronically signed by Monica Arguelles DPM on 9/8/2018 at 9:56 AM       I saw and evaluated the patient independently bedside. Please refer to resident physicians note for further details. Discussed with resident assessment and findings, I agree with plan as documented.      Kisha [address] [facility]

## 2018-09-08 NOTE — PROGRESS NOTES
Clinical Pharmacy Note                                               Warfarin Discharge Recommendations      Pt discharged from [facility] today after admission for Fall    INR today:  Recent Labs      09/08/18   0553   INR  2.00*       Coumadin 5 mg tabs    Interacting medications at discharge: none  Date INR Warfarin Dose   9/5/2018 1.26 10 mg   9/6/2018  1.34   7.5 mg    9/7/2018  1.64 7.5 mg (in hospital)      Recommendations for discharge:   Date Warfarin Dose   9/8/18 5 mg   9/9/18 5 mg   9/10/18 7.5 mg   9/11/18 INR draw     Recheck INR:  9/11/18  with results to Katerina Berg

## 2018-09-08 NOTE — DISCHARGE SUMMARY
Liyah Brown Discharge Summary     Patient ID:  Hillary Morejon  [id number]    Physician: Bear Charles DPM     Admition date: 9/4/2018    Discharge date: 9/8/18    Date of Surgery: 9/4/18    Admission Diagnoses: Inadequate pain control [R52]  Inadequate pain control [R52]  Inadequate pain control [R52]    Discharge Diagnoses: intraarticular fracture of right calcaneus right, open fracture of left tibia left    Procedures:   ORIF LEFT TIBIAL PILON, EX-FIX REMOVAL LEFT TIBIAL BONE MARROW HARVEST, APPLICATION OF BONE GRAFT, PREPARATION OF GRAFT AND SITE, APPLICATION SKIN SUBSTITUTE GRAFT, APPLICATION OF WOUND VAC    History, Physical Exam:    Podiatric Admission History and Physical Examination     CC:  Right open calcaneal fx     HPI:  The patient is a 59 y.o. male with significant past medical history of blood clot and hyperlipidema who being seen at bedside this morning. Patient was initially seen in the ED on 8.24.18 for an right open calcaneal fracture and left open tib fib fracture following a fall from a latter. Patient had ORIF of right calc fx 8.25.18 and closed reduction left side with application of ex-fix. Doing well post-op in inpt rehab. Patient seen this morning following ORIF left tib fib fx (DOS 9.4.18) patient had pop block post-op and relates pain is well controlled. Denies F,C,N, V, SOB, CP. Denies calf pain.  Relates some concern about pain management when the pop block wears off.      Past Medical History:    Past Medical History             Diagnosis Date    Embolism - blood clot       rt calf and groin    Hyperlipidemia              Past Surgical History:    Past Surgical History             Procedure Laterality Date    HIP SURGERY         lt    [place] OFFICE/OUTPT VISIT,PROCEDURE ONLY Right 8/25/2018     OPEN REDUCTION INTERNAL FIXATION OF PILON FRACTURE WITH EXTERNAL FIXATOR AND WOUND VAC PLACEMENT, RIGHT CALCANEOUS OPEN REDUCTION INTERNAL FIXATION performed by Bear Charles DPM at [facility]

## 2018-09-08 NOTE — PROGRESS NOTES
Discharge teaching and instructions for diagnosis/procedure of fractures of  BLE completed with patient using teachback method. AVS reviewed. Printed prescriptions given to patient. Patient voiced understanding regarding prescriptions, follow up appointments, and care of self at home.  Discharged in a wheelchair to  independent living per wife and [employer]

## 2018-09-11 LAB
BLOOD CULTURE, ROUTINE: NORMAL
BLOOD CULTURE, ROUTINE: NORMAL

## 2018-09-26 ENCOUNTER — HOSPITAL ENCOUNTER (OUTPATIENT)
Age: 63
Setting detail: OBSERVATION
LOS: 1 days | Discharge: HOME HEALTH | End: 2018-09-27
Attending: PODIATRIST | Admitting: PODIATRIST
Payer: COMMERCIAL

## 2018-09-26 ENCOUNTER — ANESTHESIA EVENT (OUTPATIENT)
Dept: OPERATING ROOM | Age: 63
End: 2018-09-26
Payer: COMMERCIAL

## 2018-09-26 ENCOUNTER — ANESTHESIA (OUTPATIENT)
Dept: OPERATING ROOM | Age: 63
End: 2018-09-26
Payer: COMMERCIAL

## 2018-09-26 VITALS
OXYGEN SATURATION: 100 % | DIASTOLIC BLOOD PRESSURE: 56 MMHG | SYSTOLIC BLOOD PRESSURE: 115 MMHG | RESPIRATION RATE: 13 BRPM

## 2018-09-26 DIAGNOSIS — S82.252S: Primary | ICD-10-CM

## 2018-09-26 PROBLEM — S82.832B: Status: ACTIVE | Noted: 2018-09-26

## 2018-09-26 PROBLEM — G89.18 POST-OPERATIVE PAIN: Status: ACTIVE | Noted: 2018-09-26

## 2018-09-26 PROCEDURE — 6370000000 HC RX 637 (ALT 250 FOR IP): Performed by: STUDENT IN AN ORGANIZED HEALTH CARE EDUCATION/TRAINING PROGRAM

## 2018-09-26 PROCEDURE — 2500000003 HC RX 250 WO HCPCS: Performed by: NURSE ANESTHETIST, CERTIFIED REGISTERED

## 2018-09-26 PROCEDURE — 7100000000 HC PACU RECOVERY - FIRST 15 MIN: Performed by: PODIATRIST

## 2018-09-26 PROCEDURE — 3700000000 HC ANESTHESIA ATTENDED CARE: Performed by: PODIATRIST

## 2018-09-26 PROCEDURE — 2580000003 HC RX 258: Performed by: STUDENT IN AN ORGANIZED HEALTH CARE EDUCATION/TRAINING PROGRAM

## 2018-09-26 PROCEDURE — 3600000014 HC SURGERY LEVEL 4 ADDTL 15MIN: Performed by: PODIATRIST

## 2018-09-26 PROCEDURE — 3600000004 HC SURGERY LEVEL 4 BASE: Performed by: PODIATRIST

## 2018-09-26 PROCEDURE — 2709999900 HC NON-CHARGEABLE SUPPLY

## 2018-09-26 PROCEDURE — 6370000000 HC RX 637 (ALT 250 FOR IP): Performed by: PODIATRIST

## 2018-09-26 PROCEDURE — 3700000001 HC ADD 15 MINUTES (ANESTHESIA): Performed by: PODIATRIST

## 2018-09-26 PROCEDURE — 2709999900 HC NON-CHARGEABLE SUPPLY: Performed by: PODIATRIST

## 2018-09-26 PROCEDURE — 2500000003 HC RX 250 WO HCPCS: Performed by: PODIATRIST

## 2018-09-26 PROCEDURE — G0378 HOSPITAL OBSERVATION PER HR: HCPCS

## 2018-09-26 PROCEDURE — 6360000002 HC RX W HCPCS: Performed by: STUDENT IN AN ORGANIZED HEALTH CARE EDUCATION/TRAINING PROGRAM

## 2018-09-26 PROCEDURE — 7100000001 HC PACU RECOVERY - ADDTL 15 MIN: Performed by: PODIATRIST

## 2018-09-26 PROCEDURE — 2580000003 HC RX 258: Performed by: NURSE ANESTHETIST, CERTIFIED REGISTERED

## 2018-09-26 PROCEDURE — 6360000002 HC RX W HCPCS: Performed by: NURSE ANESTHETIST, CERTIFIED REGISTERED

## 2018-09-26 DEVICE — INTEGRA® MESHED BILAYER WOUND MATRIX 2 IN*2 IN (5 CM*5 CM)
Type: IMPLANTABLE DEVICE | Site: ANKLE | Status: FUNCTIONAL
Brand: INTEGRA®

## 2018-09-26 RX ORDER — MIDAZOLAM HYDROCHLORIDE 1 MG/ML
INJECTION INTRAMUSCULAR; INTRAVENOUS PRN
Status: DISCONTINUED | OUTPATIENT
Start: 2018-09-26 | End: 2018-09-26 | Stop reason: SDUPTHER

## 2018-09-26 RX ORDER — DEXAMETHASONE SODIUM PHOSPHATE 4 MG/ML
INJECTION, SOLUTION INTRA-ARTICULAR; INTRALESIONAL; INTRAMUSCULAR; INTRAVENOUS; SOFT TISSUE PRN
Status: DISCONTINUED | OUTPATIENT
Start: 2018-09-26 | End: 2018-09-26 | Stop reason: SDUPTHER

## 2018-09-26 RX ORDER — UREA 10 %
2 LOTION (ML) TOPICAL NIGHTLY
Status: DISCONTINUED | OUTPATIENT
Start: 2018-09-26 | End: 2018-09-27 | Stop reason: HOSPADM

## 2018-09-26 RX ORDER — HYDROCODONE BITARTRATE AND ACETAMINOPHEN 5; 325 MG/1; MG/1
2 TABLET ORAL EVERY 6 HOURS PRN
COMMUNITY
End: 2018-11-27 | Stop reason: ALTCHOICE

## 2018-09-26 RX ORDER — BUPIVACAINE HYDROCHLORIDE AND EPINEPHRINE 5; 5 MG/ML; UG/ML
INJECTION, SOLUTION EPIDURAL; INTRACAUDAL; PERINEURAL PRN
Status: DISCONTINUED | OUTPATIENT
Start: 2018-09-26 | End: 2018-09-27 | Stop reason: HOSPADM

## 2018-09-26 RX ORDER — OXYCODONE HCL 20 MG/1
20 TABLET, FILM COATED, EXTENDED RELEASE ORAL EVERY 12 HOURS
COMMUNITY
End: 2018-10-22 | Stop reason: ALTCHOICE

## 2018-09-26 RX ORDER — SODIUM CHLORIDE 0.9 % (FLUSH) 0.9 %
10 SYRINGE (ML) INJECTION EVERY 12 HOURS SCHEDULED
Status: DISCONTINUED | OUTPATIENT
Start: 2018-09-26 | End: 2018-09-26

## 2018-09-26 RX ORDER — LIDOCAINE HYDROCHLORIDE 20 MG/ML
INJECTION, SOLUTION EPIDURAL; INFILTRATION; INTRACAUDAL; PERINEURAL PRN
Status: DISCONTINUED | OUTPATIENT
Start: 2018-09-26 | End: 2018-09-26 | Stop reason: SDUPTHER

## 2018-09-26 RX ORDER — OXYCODONE HCL 20 MG/1
20 TABLET, FILM COATED, EXTENDED RELEASE ORAL EVERY 12 HOURS
Status: DISCONTINUED | OUTPATIENT
Start: 2018-09-26 | End: 2018-09-27 | Stop reason: HOSPADM

## 2018-09-26 RX ORDER — SODIUM CHLORIDE 9 MG/ML
INJECTION, SOLUTION INTRAVENOUS CONTINUOUS PRN
Status: DISCONTINUED | OUTPATIENT
Start: 2018-09-26 | End: 2018-09-26 | Stop reason: SDUPTHER

## 2018-09-26 RX ORDER — FERROUS SULFATE 325(65) MG
325 TABLET ORAL
Status: DISCONTINUED | OUTPATIENT
Start: 2018-09-27 | End: 2018-09-27 | Stop reason: HOSPADM

## 2018-09-26 RX ORDER — HYDROCODONE BITARTRATE AND ACETAMINOPHEN 5; 325 MG/1; MG/1
2 TABLET ORAL EVERY 6 HOURS PRN
Status: DISCONTINUED | OUTPATIENT
Start: 2018-09-26 | End: 2018-09-27 | Stop reason: HOSPADM

## 2018-09-26 RX ORDER — PROPOFOL 10 MG/ML
INJECTION, EMULSION INTRAVENOUS PRN
Status: DISCONTINUED | OUTPATIENT
Start: 2018-09-26 | End: 2018-09-26 | Stop reason: SDUPTHER

## 2018-09-26 RX ORDER — ATORVASTATIN CALCIUM 40 MG/1
40 TABLET, FILM COATED ORAL DAILY
Status: DISCONTINUED | OUTPATIENT
Start: 2018-09-26 | End: 2018-09-27 | Stop reason: HOSPADM

## 2018-09-26 RX ORDER — ONDANSETRON 2 MG/ML
INJECTION INTRAMUSCULAR; INTRAVENOUS PRN
Status: DISCONTINUED | OUTPATIENT
Start: 2018-09-26 | End: 2018-09-26 | Stop reason: SDUPTHER

## 2018-09-26 RX ORDER — MINERAL OIL
OIL (ML) MISCELLANEOUS PRN
Status: DISCONTINUED | OUTPATIENT
Start: 2018-09-26 | End: 2018-09-27 | Stop reason: HOSPADM

## 2018-09-26 RX ORDER — FENTANYL CITRATE 50 UG/ML
INJECTION, SOLUTION INTRAMUSCULAR; INTRAVENOUS PRN
Status: DISCONTINUED | OUTPATIENT
Start: 2018-09-26 | End: 2018-09-26 | Stop reason: SDUPTHER

## 2018-09-26 RX ORDER — ONDANSETRON 4 MG/1
4 TABLET, FILM COATED ORAL EVERY 8 HOURS PRN
Status: DISCONTINUED | OUTPATIENT
Start: 2018-09-26 | End: 2018-09-27 | Stop reason: HOSPADM

## 2018-09-26 RX ORDER — WARFARIN SODIUM 7.5 MG/1
7.5 TABLET ORAL DAILY
Status: DISCONTINUED | OUTPATIENT
Start: 2018-09-26 | End: 2018-09-26

## 2018-09-26 RX ORDER — SODIUM CHLORIDE 0.9 % (FLUSH) 0.9 %
10 SYRINGE (ML) INJECTION PRN
Status: DISCONTINUED | OUTPATIENT
Start: 2018-09-26 | End: 2018-09-26

## 2018-09-26 RX ORDER — GINSENG 100 MG
CAPSULE ORAL PRN
Status: DISCONTINUED | OUTPATIENT
Start: 2018-09-26 | End: 2018-09-27 | Stop reason: HOSPADM

## 2018-09-26 RX ORDER — ONDANSETRON 4 MG/1
8 TABLET, FILM COATED ORAL EVERY 8 HOURS PRN
COMMUNITY

## 2018-09-26 RX ORDER — FERROUS SULFATE 325(65) MG
325 TABLET ORAL
COMMUNITY

## 2018-09-26 RX ADMIN — PHENYLEPHRINE HYDROCHLORIDE 100 MCG: 10 INJECTION INTRAVENOUS at 14:38

## 2018-09-26 RX ADMIN — SODIUM CHLORIDE: 9 INJECTION, SOLUTION INTRAVENOUS at 14:26

## 2018-09-26 RX ADMIN — FENTANYL CITRATE 50 MCG: 50 INJECTION INTRAMUSCULAR; INTRAVENOUS at 15:31

## 2018-09-26 RX ADMIN — PHENYLEPHRINE HYDROCHLORIDE 100 MCG: 10 INJECTION INTRAVENOUS at 15:09

## 2018-09-26 RX ADMIN — PHENYLEPHRINE HYDROCHLORIDE 100 MCG: 10 INJECTION INTRAVENOUS at 15:00

## 2018-09-26 RX ADMIN — Medication 2 MG: at 20:03

## 2018-09-26 RX ADMIN — HYDROCODONE BITARTRATE AND ACETAMINOPHEN 2 TABLET: 5; 325 TABLET ORAL at 19:13

## 2018-09-26 RX ADMIN — DEXAMETHASONE SODIUM PHOSPHATE 8 MG: 4 INJECTION, SOLUTION INTRAMUSCULAR; INTRAVENOUS at 14:41

## 2018-09-26 RX ADMIN — PROPOFOL 200 MG: 10 INJECTION, EMULSION INTRAVENOUS at 14:31

## 2018-09-26 RX ADMIN — FENTANYL CITRATE 50 MCG: 50 INJECTION INTRAMUSCULAR; INTRAVENOUS at 14:56

## 2018-09-26 RX ADMIN — MAGNESIUM HYDROXIDE 30 ML: 400 SUSPENSION ORAL at 20:03

## 2018-09-26 RX ADMIN — ONDANSETRON HYDROCHLORIDE 4 MG: 4 INJECTION, SOLUTION INTRAMUSCULAR; INTRAVENOUS at 14:41

## 2018-09-26 RX ADMIN — LIDOCAINE HYDROCHLORIDE 100 MG: 20 INJECTION, SOLUTION EPIDURAL; INFILTRATION; INTRACAUDAL; PERINEURAL at 14:31

## 2018-09-26 RX ADMIN — ATORVASTATIN CALCIUM 40 MG: 40 TABLET, FILM COATED ORAL at 18:40

## 2018-09-26 RX ADMIN — PHENYLEPHRINE HYDROCHLORIDE 200 MCG: 10 INJECTION INTRAVENOUS at 14:39

## 2018-09-26 RX ADMIN — OXYCODONE HYDROCHLORIDE 20 MG: 20 TABLET, FILM COATED, EXTENDED RELEASE ORAL at 20:03

## 2018-09-26 RX ADMIN — CEFAZOLIN SODIUM 2 G: 10 INJECTION, POWDER, FOR SOLUTION INTRAVENOUS at 14:36

## 2018-09-26 RX ADMIN — MIDAZOLAM HYDROCHLORIDE 2 MG: 1 INJECTION, SOLUTION INTRAMUSCULAR; INTRAVENOUS at 14:29

## 2018-09-26 ASSESSMENT — PULMONARY FUNCTION TESTS
PIF_VALUE: 6
PIF_VALUE: 15
PIF_VALUE: 17
PIF_VALUE: 15
PIF_VALUE: 16
PIF_VALUE: 15
PIF_VALUE: 0
PIF_VALUE: 15
PIF_VALUE: 1
PIF_VALUE: 15
PIF_VALUE: 15
PIF_VALUE: 0
PIF_VALUE: 15
PIF_VALUE: 15
PIF_VALUE: 2
PIF_VALUE: 15
PIF_VALUE: 1
PIF_VALUE: 15
PIF_VALUE: 0
PIF_VALUE: 16
PIF_VALUE: 16
PIF_VALUE: 17
PIF_VALUE: 16
PIF_VALUE: 15
PIF_VALUE: 14
PIF_VALUE: 0
PIF_VALUE: 15
PIF_VALUE: 1
PIF_VALUE: 1
PIF_VALUE: 7
PIF_VALUE: 15
PIF_VALUE: 15
PIF_VALUE: 0
PIF_VALUE: 15
PIF_VALUE: 15
PIF_VALUE: 16
PIF_VALUE: 17
PIF_VALUE: 15
PIF_VALUE: 0
PIF_VALUE: 16
PIF_VALUE: 11
PIF_VALUE: 15
PIF_VALUE: 17
PIF_VALUE: 16
PIF_VALUE: 15
PIF_VALUE: 12
PIF_VALUE: 19
PIF_VALUE: 14
PIF_VALUE: 0
PIF_VALUE: 15
PIF_VALUE: 0
PIF_VALUE: 17
PIF_VALUE: 14
PIF_VALUE: 2
PIF_VALUE: 0
PIF_VALUE: 14
PIF_VALUE: 19
PIF_VALUE: 15
PIF_VALUE: 14
PIF_VALUE: 15
PIF_VALUE: 15
PIF_VALUE: 17
PIF_VALUE: 14
PIF_VALUE: 2

## 2018-09-26 ASSESSMENT — PAIN SCALES - GENERAL
PAINLEVEL_OUTOF10: 5
PAINLEVEL_OUTOF10: 4
PAINLEVEL_OUTOF10: 7
PAINLEVEL_OUTOF10: 0
PAINLEVEL_OUTOF10: 0

## 2018-09-26 ASSESSMENT — PAIN DESCRIPTION - PAIN TYPE: TYPE: SURGICAL PAIN

## 2018-09-26 NOTE — ANESTHESIA POSTPROCEDURE EVALUATION
Department of Anesthesiology  Postprocedure Note    Patient: Leno Sheikh  MRN: [id number]  YOB: 1955  Date of evaluation: 9/26/2018  Time:  3:42 PM     Procedure Summary     Date:  09/26/18 Room / Location:  [place] [address] / [facility]    Anesthesia Start:  6496 Anesthesia Stop:  5543    Procedure:  LEFT ANKLE PREPARATION OF GRAFT SITE, HARVEST SPLIT THICKNESS SKIN GRAFT (Left Foot) Diagnosis:  (TYPE I OR II OPEN DISPLACED PILON FRACTURE OF LEFT TIBIA, WOUND LEFT LEG)    Surgeon:  Nannette Sanchez DPM Responsible Provider:  Chacha Young DO    Anesthesia Type:  general ASA Status:  2          Anesthesia Type: No value filed. Bennett Phase I: Bennett Score: 5    Bennett Phase II:      Last vitals: Reviewed and per EMR flowsheets. Anesthesia Post Evaluation    Comments: Tong [address] 61  POST-ANESTHESIA NOTE       Name:  Leno Sheikh                                         Age:  59 y.o. MRN:  [id number]      Last Vitals:  BP (!) 94/51   Pulse 60   Temp 97.5 °F (36.4 °C) (Temporal)   Resp 10   Ht 5' 4\" (1.626 m)   Wt 165 lb 14.4 oz (75.3 kg)   SpO2 100%   BMI 28.48 kg/m²   Patient Vitals in the past 4 hrs:  09/26/18 1540, BP:(!) 94/51, Pulse:60, Resp:10, SpO2:100 %  09/26/18 1535, BP:(!) 106/57, Pulse:66, Resp:13, SpO2:95 %  09/26/18 1530, BP:115/63, Pulse:64, Resp:9, SpO2:95 %  09/26/18 1528, Temp:97.5 °F (36.4 °C), Temp src:Temporal, Pulse:65, Resp:10, SpO2:96 %  09/26/18 1300, BP:131/80, Temp:97.4 °F (36.3 °C), Temp src:Oral, Pulse:73, Resp:18, SpO2:95 %, Height:5' 4\" (1.626 m), Weight:165 lb 14.4 oz (75.3 kg)    Level of Consciousness:  Awake    Respiratory:  Stable    Oxygen Saturation:  Stable    Cardiovascular:  Stable    Hydration:  Adequate    PONV:  Stable    Post-op Pain:  Adequate analgesia    Post-op Assessment:  No apparent anesthetic complications    Additional Follow-Up / Treatment / Comment:  None    Richelle Flanagan DO  September 26, 2018   3:43 PM

## 2018-09-26 NOTE — ANESTHESIA PRE PROCEDURE
Problem List   Diagnosis Code    Anticoagulated on Coumadin Z51.81, Z79.01    Displaced intraarticular fracture of right calcaneus, initial encounter for open fracture S92.061B    Open left tibial fracture S82.202B    Open fracture of distal end of left fibula S82.832B    Accidental fall from ladder W11. XXXA    ELLEN (acute kidney injury) ([facility] [address] N17.9    H/O deep venous thrombosis Z86.718    Acute blood loss as cause of postoperative anemia D62    Accidental fall from ladder, initial encounter W11. Jazlyn Lewis    Anxiety about blushing F41.9    Inadequate pain control R52    Anemia D64.9    Type I or II open fracture of distal end of left fibula S82.832B       Past Medical History:        Diagnosis Date    Embolism - blood clot     rt calf and groin    History of blood transfusion     Hyperlipidemia        Past Surgical History:        Procedure Laterality Date    CARDIAC SURGERY      FRACTURE SURGERY      left lower leg--screws    HIP SURGERY      lt    JOINT REPLACEMENT      right    [place] OFFICE/OUTPT VISIT,PROCEDURE ONLY Right 8/25/2018    OPEN REDUCTION INTERNAL FIXATION OF PILON FRACTURE WITH EXTERNAL FIXATOR AND WOUND VAC PLACEMENT, RIGHT CALCANEOUS OPEN REDUCTION INTERNAL FIXATION performed by Yee Rankin DPM at [address] OFFICE/OUTPT VISIT,PROCEDURE ONLY Left 9/4/2018    ORIF LEFT TIBIAL PILON, EX-FIX REMOVAL LEFT TIBIAL BONE MARROW HARVEST, PREPARATION OF GRAFT AND SITE, APPLICATION SKIN SUBSTITUTE GRAFT performed by Yee Rankin DPM at [facility] History:    Social History   Substance Use Topics    Smoking status: Never Smoker    Smokeless tobacco: Never Used    Alcohol use No                                Counseling given: No      Vital Signs (Current):   Vitals:    09/26/18 1300   BP: 131/80   Pulse: 73   Resp: 18   Temp: 97.4 °F (36.3 °C)   TempSrc: Oral   SpO2: 95%   Weight: 165 lb 14.4 oz (75.3 kg)   Height: 5' 4\" (1.626 m)

## 2018-09-27 VITALS
HEIGHT: 64 IN | HEART RATE: 64 BPM | BODY MASS INDEX: 28.32 KG/M2 | SYSTOLIC BLOOD PRESSURE: 109 MMHG | WEIGHT: 165.9 LBS | DIASTOLIC BLOOD PRESSURE: 60 MMHG | RESPIRATION RATE: 16 BRPM | OXYGEN SATURATION: 95 % | TEMPERATURE: 97.1 F

## 2018-09-27 LAB — INR BLD: 1.14 (ref 0.85–1.13)

## 2018-09-27 PROCEDURE — 36415 COLL VENOUS BLD VENIPUNCTURE: CPT

## 2018-09-27 PROCEDURE — 2709999900 HC NON-CHARGEABLE SUPPLY

## 2018-09-27 PROCEDURE — G0378 HOSPITAL OBSERVATION PER HR: HCPCS

## 2018-09-27 PROCEDURE — 6370000000 HC RX 637 (ALT 250 FOR IP): Performed by: PHARMACIST

## 2018-09-27 PROCEDURE — 6370000000 HC RX 637 (ALT 250 FOR IP): Performed by: STUDENT IN AN ORGANIZED HEALTH CARE EDUCATION/TRAINING PROGRAM

## 2018-09-27 PROCEDURE — 6360000002 HC RX W HCPCS: Performed by: STUDENT IN AN ORGANIZED HEALTH CARE EDUCATION/TRAINING PROGRAM

## 2018-09-27 PROCEDURE — 85610 PROTHROMBIN TIME: CPT

## 2018-09-27 PROCEDURE — 6360000002 HC RX W HCPCS: Performed by: PODIATRIST

## 2018-09-27 RX ORDER — CEFAZOLIN SODIUM 1 G/50ML
1 INJECTION, SOLUTION INTRAVENOUS EVERY 8 HOURS
Status: DISCONTINUED | OUTPATIENT
Start: 2018-09-27 | End: 2018-09-27 | Stop reason: HOSPADM

## 2018-09-27 RX ORDER — CEFADROXIL 500 MG/1
500 CAPSULE ORAL 2 TIMES DAILY
Qty: 20 CAPSULE | Refills: 0 | Status: SHIPPED | OUTPATIENT
Start: 2018-09-27 | End: 2018-10-07

## 2018-09-27 RX ORDER — HYDROCODONE BITARTRATE AND ACETAMINOPHEN 5; 325 MG/1; MG/1
1 TABLET ORAL EVERY 4 HOURS PRN
Qty: 42 TABLET | Refills: 0 | Status: SHIPPED | OUTPATIENT
Start: 2018-09-27 | End: 2018-10-04

## 2018-09-27 RX ADMIN — ATORVASTATIN CALCIUM 40 MG: 40 TABLET, FILM COATED ORAL at 08:36

## 2018-09-27 RX ADMIN — ONDANSETRON HYDROCHLORIDE 4 MG: 4 TABLET, FILM COATED ORAL at 09:57

## 2018-09-27 RX ADMIN — HYDROCODONE BITARTRATE AND ACETAMINOPHEN 2 TABLET: 5; 325 TABLET ORAL at 13:21

## 2018-09-27 RX ADMIN — WARFARIN SODIUM 12.5 MG: 2.5 TABLET ORAL at 16:21

## 2018-09-27 RX ADMIN — MAGNESIUM HYDROXIDE 30 ML: 400 SUSPENSION ORAL at 08:36

## 2018-09-27 RX ADMIN — HYDROCODONE BITARTRATE AND ACETAMINOPHEN 2 TABLET: 5; 325 TABLET ORAL at 01:05

## 2018-09-27 RX ADMIN — OXYCODONE HYDROCHLORIDE 20 MG: 20 TABLET, FILM COATED, EXTENDED RELEASE ORAL at 08:36

## 2018-09-27 RX ADMIN — FERROUS SULFATE TAB 325 MG (65 MG ELEMENTAL FE) 325 MG: 325 (65 FE) TAB at 08:36

## 2018-09-27 RX ADMIN — CEFAZOLIN SODIUM 1 G: 1 INJECTION, SOLUTION INTRAVENOUS at 12:08

## 2018-09-27 RX ADMIN — HYDROCODONE BITARTRATE AND ACETAMINOPHEN 2 TABLET: 5; 325 TABLET ORAL at 07:01

## 2018-09-27 ASSESSMENT — PAIN DESCRIPTION - DESCRIPTORS: DESCRIPTORS: DULL

## 2018-09-27 ASSESSMENT — PAIN SCALES - GENERAL
PAINLEVEL_OUTOF10: 5
PAINLEVEL_OUTOF10: 5
PAINLEVEL_OUTOF10: 6

## 2018-09-27 ASSESSMENT — PAIN DESCRIPTION - ONSET: ONSET: ON-GOING

## 2018-09-27 ASSESSMENT — PAIN DESCRIPTION - LOCATION: LOCATION: LEG

## 2018-09-27 ASSESSMENT — PAIN DESCRIPTION - PAIN TYPE
TYPE: SURGICAL PAIN
TYPE: SURGICAL PAIN

## 2018-09-27 ASSESSMENT — PAIN DESCRIPTION - ORIENTATION: ORIENTATION: LEFT

## 2018-09-27 ASSESSMENT — PAIN DESCRIPTION - FREQUENCY: FREQUENCY: CONTINUOUS

## 2018-09-27 NOTE — CARE COORDINATION
9/27/18, 2:36 PM  Nancy Pastrana day: 1  Location: -26/026-A Reason for admit: Other type I or II open fracture of distal end of left fibula, initial encounter [S82.832B]  Post-operative pain [G89.18] - from accidental fall from ladder. Clinical update: Pt is a readmit, but observation status. He was discharged from [address] [place] [address] on 9/4. Patient is 1 day s/p application of split thickness skin graft left ankle. Spoke to patient's nurse and she states patient is discharged, he has a wound vac on but it was brought in by family from outside the hospital. It is a home wound vac. Discharge plan: Pt is from home and current with [facility]. SS has screened and no other discharge needs identified.

## 2018-09-27 NOTE — FLOWSHEET NOTE
09/27/18 1045   Provider Notification   Reason for Communication Review case   Provider Name Dr Ravi Lieberman   Provider Notification Physician   Method of Communication Secure Message   Response Waiting for response   Notification Time 80   Dr Clinton Ortez about anticoagulants per family request.   1040 Dr Ravi Lieberman returned call and okay to give coumadin after 3 PM and to start Lovenox at home. New order for Ancef every 8 hours received. Dr Ravi Lieberman also said he will be in to see patient at 6 PM before discharge.

## 2018-09-27 NOTE — PROGRESS NOTES
Progress note: Podiatric Surgery    Patient - Jacob Luciano,  Age - 59 y.o.    - 1955      Room Number - 3M-04/098-B   MRN -  [id number]   [facility]  Date of Admission -  2018 12:56 PM    SUBJECTIVE:     18  Patient seen at bedside this morning on behalf of Dr. Jonnie Benitez. Patient is 1 day s/p application of split thickness skin graft left ankle and application of wound vac. Patient relates he is doing well and admits pain is well controlled. Denies F,C,N,V, SOB, CP. Patient has not had a BM to this morning but denies abd pain and constipation. No other complaints at this time. OBJECTIVE   VITALS    height is 5' 4\" (1.626 m) and weight is 165 lb 14.4 oz (75.3 kg). His oral temperature is 97.6 °F (36.4 °C). His blood pressure is 112/61 and his pulse is 61. His respiration is 16 and oxygen saturation is 95%. Wt Readings from Last 3 Encounters:   18 165 lb 14.4 oz (75.3 kg)   18 175 lb (79.4 kg)   18 166 lb 8 oz (75.5 kg)       I/O (24 Hours)    Intake/Output Summary (Last 24 hours) at 18 1190  Last data filed at 18   Gross per 24 hour   Intake             1172 ml   Output                0 ml   Net             1172 ml       PHYSICAL EXAMINATION   General Appearance  Awake, alert, oriented,  not  In acute distress  Lungs -  Non labored breathing  Cardiovascular - Regular rate  Abdomen - soft, not distended, nontender, no organomegally,  Neurologic - Awake, alert, oriented to name, place and time. no deficit  Skin - No bruising or bleeding  LOWER EXTREMITY  Physical Exam:   Vascular: brisk cap refill bilateral digits    Dermatologic: Left posterior splint in place without strike through. Wound vac intact and functioning well. Minimal drainage output. deferred    Neurovascular: light touch sensation intact.  Digital flexors and extensors intact    Musculoskeletal: no tenderness on palpation posterior

## 2018-10-22 ENCOUNTER — HOSPITAL ENCOUNTER (OUTPATIENT)
Dept: WOUND CARE | Age: 63
Discharge: HOME | End: 2018-10-22
Payer: COMMERCIAL

## 2018-10-22 VITALS
DIASTOLIC BLOOD PRESSURE: 87 MMHG | TEMPERATURE: 97.9 F | HEART RATE: 82 BPM | RESPIRATION RATE: 18 BRPM | SYSTOLIC BLOOD PRESSURE: 146 MMHG | HEIGHT: 64 IN | BODY MASS INDEX: 28.17 KG/M2 | OXYGEN SATURATION: 98 % | WEIGHT: 165 LBS

## 2018-10-22 DIAGNOSIS — S92.061B: Primary | ICD-10-CM

## 2018-10-22 DIAGNOSIS — S82.252E TYPE I OR II OPEN DISPLACED COMMINUTED FRACTURE OF SHAFT OF LEFT TIBIA WITH ROUTINE HEALING, SUBSEQUENT ENCOUNTER: ICD-10-CM

## 2018-10-22 PROCEDURE — 97597 DBRDMT OPN WND 1ST 20 CM/<: CPT

## 2018-10-22 PROCEDURE — 17250 CHEM CAUT OF GRANLTJ TISSUE: CPT

## 2018-10-22 PROCEDURE — 6370000000 HC RX 637 (ALT 250 FOR IP): Performed by: PODIATRIST

## 2018-10-22 PROCEDURE — 2709999900 HC NON-CHARGEABLE SUPPLY

## 2018-10-22 PROCEDURE — G0463 HOSPITAL OUTPT CLINIC VISIT: HCPCS

## 2018-10-22 RX ORDER — CEFUROXIME AXETIL 250 MG/1
500 TABLET ORAL 2 TIMES DAILY
Qty: 56 TABLET | Refills: 0 | Status: SHIPPED | OUTPATIENT
Start: 2018-10-22 | End: 2018-11-05

## 2018-10-22 RX ORDER — CHLORHEXIDINE GLUCONATE 4 G/100ML
SOLUTION TOPICAL
Qty: 1 BOTTLE | Refills: 0 | Status: SHIPPED | OUTPATIENT
Start: 2018-10-22 | End: 2018-11-05

## 2018-10-22 RX ORDER — LEVOFLOXACIN 750 MG/1
750 TABLET ORAL DAILY
COMMUNITY
End: 2018-10-25 | Stop reason: ALTCHOICE

## 2018-10-22 RX ORDER — PROMETHAZINE HYDROCHLORIDE 12.5 MG/1
12.5 TABLET ORAL EVERY 8 HOURS PRN
Qty: 40 TABLET | Refills: 0 | Status: SHIPPED | OUTPATIENT
Start: 2018-10-22 | End: 2018-10-29

## 2018-10-22 RX ADMIN — LIDOCAINE HYDROCHLORIDE: 20 JELLY TOPICAL at 11:35

## 2018-10-22 RX ADMIN — SILVER NITRATE APPLICATORS 5 EACH: 25; 75 STICK TOPICAL at 12:09

## 2018-10-22 ASSESSMENT — PAIN SCALES - GENERAL: PAINLEVEL_OUTOF10: 2

## 2018-10-22 ASSESSMENT — PAIN DESCRIPTION - PAIN TYPE: TYPE: ACUTE PAIN

## 2018-10-22 ASSESSMENT — PAIN DESCRIPTION - ORIENTATION: ORIENTATION: LEFT

## 2018-10-22 ASSESSMENT — PAIN DESCRIPTION - PROGRESSION: CLINICAL_PROGRESSION: GRADUALLY IMPROVING

## 2018-10-22 ASSESSMENT — PAIN DESCRIPTION - FREQUENCY: FREQUENCY: INTERMITTENT

## 2018-10-22 ASSESSMENT — PAIN DESCRIPTION - DESCRIPTORS: DESCRIPTORS: ACHING

## 2018-10-22 ASSESSMENT — PAIN DESCRIPTION - LOCATION: LOCATION: LEG;FOOT

## 2018-10-22 ASSESSMENT — PAIN DESCRIPTION - ONSET: ONSET: ON-GOING

## 2018-10-22 NOTE — CONSULTS
[address] and Procedure Note      Giancarlo Campos  MEDICAL RECORD NUMBER:  [id number]  AGE: 59 y.o. GENDER: male  : 1955  EPISODE DATE:  10/22/2018    Subjective:     Chief Complaint   Patient presents with    New Patient    Wound Check     bilateral legs          HISTORY of PRESENT ILLNESS HPI     Giancarlo Campos is a 59 y.o. male presenting for initial evaluation of a left lower extremity wound, s/p open tibial fracture with interval ORIF. Updated swab culture from office indicates polymicrobial growth in the form of Strep and Enterbacter. Patient currently on Levaquin, but not tolerating. Conferred with ID, and patient placed on 2nd generation Ceftin 500mg BID for antibiotic coverage. Need for selective debridement of left lower extremity wound was indicated, tolerated well. Patient placed in a unna boot compression therapy. Follow up in (1) week. All further questions/concerns were otherwise addressed.          PAST MEDICAL HISTORY        Diagnosis Date    Embolism - blood clot     rt calf and groin    History of blood transfusion     Hyperlipidemia        PAST SURGICAL HISTORY    Past Surgical History:   Procedure Laterality Date    CARDIAC SURGERY      FRACTURE SURGERY      left lower leg--screws    HIP SURGERY      lt    JOINT REPLACEMENT      right    [place] OFFICE/OUTPT VISIT,PROCEDURE ONLY Right 2018    OPEN REDUCTION INTERNAL FIXATION OF PILON FRACTURE WITH EXTERNAL FIXATOR AND WOUND VAC PLACEMENT, RIGHT CALCANEOUS OPEN REDUCTION INTERNAL FIXATION performed by Jun Hodgson DPM at [address] OFFICE/OUTPT VISIT,PROCEDURE ONLY Left 2018    ORIF LEFT TIBIAL PILON, EX-FIX REMOVAL LEFT TIBIAL BONE MARROW HARVEST, PREPARATION OF GRAFT AND SITE, APPLICATION SKIN SUBSTITUTE GRAFT performed by Jun Hodgson DPM at [address] OFFICE/OUTPT VISIT,PROCEDURE ONLY Left 2018    LEFT ANKLE PREPARATION OF GRAFT SITE, HARVEST SPLIT THICKNESS of days: 0       Wound 10/22/18 Leg Left; Lower; Posterior #2 (Active)   Wound Image   10/22/2018 11:21 AM   Wound Type Wound 10/22/2018 11:21 AM   Dressing Status Intact 10/22/2018 11:21 AM   Dressing Changed Changed/New 10/22/2018 11:21 AM   Wound Cleansed Rinsed/Irrigated with saline 10/22/2018 11:21 AM   Wound Length (cm) 8.5 cm 10/22/2018 11:21 AM   Wound Width (cm) 2 cm 10/22/2018 11:21 AM   Wound Depth (cm)  0.3 10/22/2018 11:21 AM   Calculated Wound Size (cm^2) (l*w) 17 cm^2 10/22/2018 11:21 AM   Wound Assessment Yellow;Pink;Black 10/22/2018 11:21 AM   Drainage Amount Scant 10/22/2018 11:21 AM   Drainage Description Yellow 10/22/2018 11:21 AM   Odor None 10/22/2018 11:21 AM   Margins Attached edges 10/22/2018 11:21 AM   Juli-wound Assessment Dry;Pink 10/22/2018 11:21 AM   Zinc%Wound Bed 5 10/22/2018 11:21 AM   Yellow%Wound Bed 5 10/22/2018 11:21 AM   Black%Wound Bed 90 10/22/2018 11:21 AM   Op First Treatment Date 10/22/18 10/22/2018 11:21 AM   Number of days: 0       Wound 10/22/18 Leg Right; Lower; Posterior #3 (Active)   Wound Image   10/22/2018 11:21 AM   Wound Type Wound 10/22/2018 11:21 AM   Dressing Status Intact 10/22/2018 11:21 AM   Dressing Changed Changed/New 10/22/2018 11:21 AM   Wound Cleansed Rinsed/Irrigated with saline 10/22/2018 11:21 AM   Wound Length (cm) 1 cm 10/22/2018 11:21 AM   Wound Width (cm) 0.4 cm 10/22/2018 11:21 AM   Wound Depth (cm)  0.2 10/22/2018 11:21 AM   Calculated Wound Size (cm^2) (l*w) 0.4 cm^2 10/22/2018 11:21 AM   Wound Assessment Yellow;Pink 10/22/2018 11:21 AM   Drainage Amount Scant 10/22/2018 11:21 AM   Drainage Description Serosanguinous 10/22/2018 11:21 AM   Odor None 10/22/2018 11:21 AM   Margins Attached edges 10/22/2018 11:21 AM   Juli-wound Assessment Dry 10/22/2018 11:21 AM   Zinc%Wound Bed 80 10/22/2018 11:21 AM   Yellow%Wound Bed 20 10/22/2018 11:21 AM   Op First Treatment Date 10/22/18 10/22/2018 11:21 AM   Number of days: 0       Wound 10/22/18 Heel Right #4 (Active)   Wound Type Wound 10/22/2018 11:21 AM   Dressing Status Intact 10/22/2018 11:21 AM   Dressing Changed Changed/New 10/22/2018 11:21 AM   Wound Cleansed Rinsed/Irrigated with saline 10/22/2018 11:21 AM   Wound Length (cm) 0.8 cm 10/22/2018 11:21 AM   Wound Width (cm) 2.6 cm 10/22/2018 11:21 AM   Wound Depth (cm)  hypergranular 10/22/2018 11:21 AM   Calculated Wound Size (cm^2) (l*w) 2.08 cm^2 10/22/2018 11:21 AM   Wound Assessment Red 10/22/2018 11:21 AM   Drainage Amount Small 10/22/2018 11:21 AM   Drainage Description Serosanguinous 10/22/2018 11:21 AM   Odor None 10/22/2018 11:21 AM   Margins Attached edges 10/22/2018 11:21 AM   Juli-wound Assessment Dry 10/22/2018 11:21 AM   Red%Wound Bed 100 10/22/2018 11:21 AM   Op First Treatment Date 10/22/18 10/22/2018 11:21 AM   Number of days: 0       Incision 08/25/18 Leg Left (Active)   Wound Assessment [facility] 9/27/2018  4:15 PM   Juli-wound Assessment [facility] 9/27/2018  4:15 PM   Closure MARLINE 9/27/2018  4:15 PM   Number of days: 57       Incision 08/25/18 Leg Right (Active)   Wound Assessment [facility] 9/27/2018  4:15 PM   Juli-wound Assessment [facility] 9/27/2018  4:15 PM   Closure MARLINE 9/27/2018  4:15 PM   Number of days: 57       Incision 09/04/18 Ankle Left (Active)   Wound Assessment [facility] 9/27/2018  4:15 PM   Juli-wound Assessment [facility] 9/27/2018  4:15 PM   Closure [facility] 9/27/2018  4:15 PM   Number of days: 47       Incision 09/26/18 Leg Left (Active)   Wound Assessment [facility] 9/27/2018  4:15 PM   Juli-wound Assessment [facility] 9/27/2018  4:15 PM   Closure MARLINE 9/27/2018  4:15 PM   Drainage Amount Scant 9/26/2018  4:10 PM   Dressing/Treatment Xeroform; Other (Comment); Ace Wrap;Dry dressing 9/26/2018  4:10 PM   Dressing Status Clean;Dry; Intact 9/26/2018  4:10 PM   Number of days: 25     Percent of Wound Debrided:  100%  Total Surface Area Debrided:  43 sq cm  Bleeding:  Minimal  Hemostasis Achieved:  by silver nitrate stick  Procedural Pain: 0  / 10   Post Procedural Pain:  0 / 10

## 2018-10-23 ENCOUNTER — TELEPHONE (OUTPATIENT)
Dept: WOUND CARE | Age: 63
End: 2018-10-23

## 2018-10-25 ENCOUNTER — HOSPITAL ENCOUNTER (OUTPATIENT)
Dept: WOUND CARE | Age: 63
Discharge: HOME | End: 2018-10-25
Payer: COMMERCIAL

## 2018-10-25 VITALS
SYSTOLIC BLOOD PRESSURE: 129 MMHG | OXYGEN SATURATION: 97 % | DIASTOLIC BLOOD PRESSURE: 87 MMHG | HEART RATE: 80 BPM | RESPIRATION RATE: 18 BRPM | TEMPERATURE: 97.9 F

## 2018-10-25 DIAGNOSIS — S82.832J: Primary | ICD-10-CM

## 2018-10-25 PROCEDURE — 2709999900 HC NON-CHARGEABLE SUPPLY

## 2018-10-25 PROCEDURE — 29580 STRAPPING UNNA BOOT: CPT

## 2018-10-29 ENCOUNTER — HOSPITAL ENCOUNTER (OUTPATIENT)
Age: 63
Discharge: HOME | End: 2018-10-29
Payer: COMMERCIAL

## 2018-10-29 ENCOUNTER — HOSPITAL ENCOUNTER (OUTPATIENT)
Dept: GENERAL RADIOLOGY | Age: 63
Discharge: HOME | End: 2018-10-29
Payer: COMMERCIAL

## 2018-10-29 ENCOUNTER — HOSPITAL ENCOUNTER (OUTPATIENT)
Dept: WOUND CARE | Age: 63
Discharge: HOME | End: 2018-10-29
Payer: COMMERCIAL

## 2018-10-29 VITALS
DIASTOLIC BLOOD PRESSURE: 77 MMHG | RESPIRATION RATE: 16 BRPM | SYSTOLIC BLOOD PRESSURE: 141 MMHG | TEMPERATURE: 97.8 F | OXYGEN SATURATION: 98 % | HEART RATE: 74 BPM

## 2018-10-29 DIAGNOSIS — S82.252E TYPE I OR II OPEN DISPLACED COMMINUTED FRACTURE OF SHAFT OF LEFT TIBIA WITH ROUTINE HEALING, SUBSEQUENT ENCOUNTER: ICD-10-CM

## 2018-10-29 DIAGNOSIS — S81.801S LEG WOUND, RIGHT, SEQUELA: Primary | ICD-10-CM

## 2018-10-29 DIAGNOSIS — S81.802S LEG WOUND, LEFT, SEQUELA: ICD-10-CM

## 2018-10-29 DIAGNOSIS — S92.061B: ICD-10-CM

## 2018-10-29 PROCEDURE — 97598 DBRDMT OPN WND ADDL 20CM/<: CPT

## 2018-10-29 PROCEDURE — 73590 X-RAY EXAM OF LOWER LEG: CPT

## 2018-10-29 PROCEDURE — 73630 X-RAY EXAM OF FOOT: CPT

## 2018-10-29 PROCEDURE — 29580 STRAPPING UNNA BOOT: CPT

## 2018-10-29 PROCEDURE — 2709999900 HC NON-CHARGEABLE SUPPLY

## 2018-10-29 PROCEDURE — 97597 DBRDMT OPN WND 1ST 20 CM/<: CPT

## 2018-10-29 PROCEDURE — 17250 CHEM CAUT OF GRANLTJ TISSUE: CPT

## 2018-10-29 NOTE — PROGRESS NOTES
Cancer Mother         ovarian    Heart Disease Father     High Blood Pressure Father     Cancer Sister         breast       SOCIAL HISTORY    Social History   Substance Use Topics    Smoking status: Never Smoker    Smokeless tobacco: Never Used    Alcohol use No       ALLERGIES    Allergies   Allergen Reactions    Morphine Nausea And Vomiting     Profuse Vomiting       MEDICATIONS    Current Outpatient Prescriptions on File Prior to Encounter   Medication Sig Dispense Refill    cefUROXime (CEFTIN) 250 MG tablet Take 2 tablets by mouth 2 times daily for 14 days 56 tablet 0    chlorhexidine (HIBICLENS) 4 % external liquid Apply topically daily as needed. 1 Bottle 0    promethazine (PHENERGAN) 12.5 MG tablet Take 1 tablet by mouth every 8 hours as needed for Nausea 40 tablet 0    HYDROcodone-acetaminophen (NORCO) 5-325 MG per tablet Take 2 tablets by mouth every 6 hours as needed for Pain. Karl Salvadorkeman magnesium hydroxide (MILK OF MAGNESIA) 400 MG/5ML suspension Take 30 mLs by mouth 2 times daily      MELATONIN ER PO Take by mouth nightly      ferrous sulfate 325 (65 Fe) MG tablet Take 325 mg by mouth daily (with breakfast)      ondansetron (ZOFRAN) 4 MG tablet Take 4 mg by mouth every 8 hours as needed for Nausea or Vomiting      warfarin (COUMADIN) 7.5 MG tablet Take 5 mg by mouth       Atorvastatin Calcium (LIPITOR PO) Take 40 mg by mouth daily        No current facility-administered medications on file prior to encounter. REVIEW OF SYSTEMS    A comprehensive review of systems was negative.     Objective:      BP (!) 141/77   Pulse 74   Temp 97.8 °F (36.6 °C) (Oral)   Resp 16   SpO2 98%     Wt Readings from Last 3 Encounters:   10/22/18 165 lb (74.8 kg)   09/26/18 165 lb 14.4 oz (75.3 kg)   09/04/18 175 lb (79.4 kg)       PHYSICAL EXAM  PHYSICAL EXAMINATION  CONSTITUTIONAL:  awake, alert, cooperative, no apparent distress, and appears stated age  EYES:  pupils equal, round and reactive to light, Changed/New 10/29/2018  9:37 AM   Wound Cleansed Rinsed/Irrigated with saline 10/29/2018  9:37 AM   Wound Length (cm) 0.8 cm 10/29/2018  9:37 AM   Wound Width (cm) 0.3 cm 10/29/2018  9:37 AM   Wound Depth (cm)  scab 10/29/2018  9:37 AM   Calculated Wound Size (cm^2) (l*w) 0.24 cm^2 10/29/2018  9:37 AM   Change in Wound Size % (l*w) 40 10/29/2018  9:37 AM   Wound Assessment Red 10/29/2018  9:37 AM   Drainage Amount None 10/29/2018  9:37 AM   Drainage Description Serosanguinous 10/22/2018 11:21 AM   Odor None 10/29/2018  9:37 AM   Margins Attached edges 10/29/2018  9:37 AM   Juli-wound Assessment Dry 10/29/2018  9:37 AM   [place]%Wound Bed 50 10/29/2018  9:37 AM   Yellow%Wound Bed 50 10/29/2018  9:37 AM   Op First Treatment Date 10/22/18 10/22/2018 11:21 AM   Number of days: 6       Wound 10/22/18 Heel Right #4 (Active)   Wound Type Wound 10/29/2018  9:37 AM   Dressing Status Intact 10/29/2018  9:37 AM   Dressing Changed Changed/New 10/29/2018  9:37 AM   Wound Cleansed Rinsed/Irrigated with saline 10/29/2018  9:37 AM   Wound Length (cm) 0.7 cm 10/29/2018  9:37 AM   Wound Width (cm) 2.7 cm 10/29/2018  9:37 AM   Wound Depth (cm)  0.05 10/29/2018  9:37 AM   Calculated Wound Size (cm^2) (l*w) 1.89 cm^2 10/29/2018  9:37 AM   Change in Wound Size % (l*w) 9.13 10/29/2018  9:37 AM   Wound Assessment Pink;Yellow 10/29/2018  9:37 AM   Drainage Amount Small 10/29/2018  9:37 AM   Drainage Description Serosanguinous 10/29/2018  9:37 AM   Odor None 10/29/2018  9:37 AM   Margins Attached edges 10/29/2018  9:37 AM   Juli-wound Assessment Dry 10/29/2018  9:37 AM   [place]%Wound Bed 50 10/29/2018  9:37 AM   Red%Wound Bed 100 10/22/2018 11:21 AM   Yellow%Wound Bed 50 10/29/2018  9:37 AM   Op First Treatment Date 10/22/18 10/22/2018 11:21 AM   Number of days: 6       Wound 10/25/18 Heel Left #5 (Active)   Wound Image   10/29/2018  9:37 AM   Wound Type Wound 10/25/2018 12:36 PM   Dressing Status Intact 10/29/2018  9:37 AM   Dressing Changed

## 2018-11-01 ENCOUNTER — HOSPITAL ENCOUNTER (OUTPATIENT)
Dept: WOUND CARE | Age: 63
Discharge: HOME | End: 2018-11-01
Payer: COMMERCIAL

## 2018-11-01 VITALS
TEMPERATURE: 98 F | SYSTOLIC BLOOD PRESSURE: 138 MMHG | HEART RATE: 98 BPM | OXYGEN SATURATION: 96 % | DIASTOLIC BLOOD PRESSURE: 80 MMHG | RESPIRATION RATE: 16 BRPM

## 2018-11-01 DIAGNOSIS — S81.801S LEG WOUND, RIGHT, SEQUELA: ICD-10-CM

## 2018-11-01 DIAGNOSIS — S81.802S LEG WOUND, LEFT, SEQUELA: ICD-10-CM

## 2018-11-01 PROCEDURE — 2709999900 HC NON-CHARGEABLE SUPPLY

## 2018-11-01 PROCEDURE — 29580 STRAPPING UNNA BOOT: CPT

## 2018-11-01 NOTE — PLAN OF CARE
Problem: Wound:  Goal: Will show signs of wound healing; wound closure and no evidence of infection  Will show signs of wound healing; wound closure and no evidence of infection  Outcome: Ongoing  Pt presents to clinic with ongoing care of left lower leg and bilateral feet wounds. No s/s of infection. Right heel stable eschar, betadine and ABD, kerlix and ace to right lower leg. Left lower leg: aquacel ag to anterior leg wound followed by abd pad. Betadine and abd pad applied to left heel wound. Left leg wrapped with unna boot, kerlix, coban, cast padding, ABD to splint heel, splint, ace wrap. Pt to leave intact until Monday. Pt afebrile. Pt uses roll about scooter to offload left lower leg. Fracture boot to right lower leg. Comments: Care plan reviewed with patient and pt wife. Patient and pt wife verbalize understanding of the plan of care and contribute to goal setting.

## 2018-11-05 ENCOUNTER — HOSPITAL ENCOUNTER (OUTPATIENT)
Dept: WOUND CARE | Age: 63
Discharge: HOME | End: 2018-11-05
Payer: COMMERCIAL

## 2018-11-05 VITALS
HEART RATE: 104 BPM | SYSTOLIC BLOOD PRESSURE: 139 MMHG | RESPIRATION RATE: 16 BRPM | DIASTOLIC BLOOD PRESSURE: 72 MMHG | OXYGEN SATURATION: 97 % | TEMPERATURE: 97.9 F | BODY MASS INDEX: 28.17 KG/M2 | HEIGHT: 64 IN | WEIGHT: 165 LBS

## 2018-11-05 PROCEDURE — 6370000000 HC RX 637 (ALT 250 FOR IP): Performed by: PODIATRIST

## 2018-11-05 PROCEDURE — 17250 CHEM CAUT OF GRANLTJ TISSUE: CPT

## 2018-11-05 PROCEDURE — 2709999900 HC NON-CHARGEABLE SUPPLY

## 2018-11-05 PROCEDURE — 29580 STRAPPING UNNA BOOT: CPT

## 2018-11-05 RX ORDER — CEFUROXIME AXETIL 250 MG/1
500 TABLET ORAL 2 TIMES DAILY
Qty: 56 TABLET | Refills: 0 | Status: SHIPPED | OUTPATIENT
Start: 2018-11-05 | End: 2018-11-19

## 2018-11-05 RX ADMIN — SILVER NITRATE APPLICATORS 1 EACH: 25; 75 STICK TOPICAL at 09:03

## 2018-11-05 ASSESSMENT — PAIN SCALES - GENERAL: PAINLEVEL_OUTOF10: 2

## 2018-11-05 ASSESSMENT — PAIN DESCRIPTION - PROGRESSION: CLINICAL_PROGRESSION: NOT CHANGED

## 2018-11-05 ASSESSMENT — PAIN DESCRIPTION - ONSET: ONSET: GRADUAL

## 2018-11-05 ASSESSMENT — PAIN DESCRIPTION - PAIN TYPE: TYPE: ACUTE PAIN

## 2018-11-05 ASSESSMENT — PAIN DESCRIPTION - LOCATION: LOCATION: LEG

## 2018-11-05 ASSESSMENT — PAIN DESCRIPTION - FREQUENCY: FREQUENCY: INTERMITTENT

## 2018-11-05 ASSESSMENT — PAIN DESCRIPTION - DESCRIPTORS: DESCRIPTORS: ACHING

## 2018-11-05 ASSESSMENT — PAIN DESCRIPTION - ORIENTATION: ORIENTATION: RIGHT;LEFT

## 2018-11-05 NOTE — H&P
OF GRAFT SITE, HARVEST SPLIT THICKNESS SKIN GRAFT performed by Maryam Horn DPM at [address] [place] [address] Pkw[address] HISTORY    Family History   Problem Relation Age of Onset    Cancer Mother         ovarian    Heart Disease Father     High Blood Pressure Father     Cancer Sister         breast       SOCIAL HISTORY    Social History   Substance Use Topics    Smoking status: Never Smoker    Smokeless tobacco: Never Used    Alcohol use No       ALLERGIES    Allergies   Allergen Reactions    Morphine Nausea And Vomiting     Profuse Vomiting       MEDICATIONS    Current Outpatient Prescriptions on File Prior to Encounter   Medication Sig Dispense Refill    cefUROXime (CEFTIN) 250 MG tablet Take 2 tablets by mouth 2 times daily for 14 days 56 tablet 0    HYDROcodone-acetaminophen (NORCO) 5-325 MG per tablet Take 2 tablets by mouth every 6 hours as needed for Pain. [facility] magnesium hydroxide (MILK OF MAGNESIA) 400 MG/5ML suspension Take 30 mLs by mouth 2 times daily      ferrous sulfate 325 (65 Fe) MG tablet Take 325 mg by mouth daily (with breakfast)      ondansetron (ZOFRAN) 4 MG tablet Take 8 mg by mouth every 8 hours as needed for Nausea or Vomiting       warfarin (COUMADIN) 7.5 MG tablet Take 5 mg by mouth       Atorvastatin Calcium (LIPITOR PO) Take 40 mg by mouth daily       chlorhexidine (HIBICLENS) 4 % external liquid Apply topically daily as needed. 1 Bottle 0     No current facility-administered medications on file prior to encounter. REVIEW OF SYSTEMS    Pertinent items are noted in HPI.     Objective:      /72   Pulse 104   Temp 97.9 °F (36.6 °C) (Oral)   Resp 16   Ht 5' 4\" (1.626 m)   Wt 165 lb (74.8 kg)   SpO2 97%   BMI 28.32 kg/m²     Wt Readings from Last 3 Encounters:   11/05/18 165 lb (74.8 kg)   10/22/18 165 lb (74.8 kg)   09/26/18 165 lb 14.4 oz (75.3 kg)       PHYSICAL EXAM    CONSTITUTIONAL:  awake, alert, cooperative, no apparent distress, and appears stated age  EYES: pupils equal, round and reactive to light, extra ocular muscles intact, sclera clear, conjunctiva normal  ENT:  normocepalic, without obvious abnormality  NECK:  Supple, symmetrical, trachea midline, no adenopathy, thyroid symmetric, not enlarged and no tenderness, skin normal  LUNGS:  No increased work of breathing, good air exchange, clear to auscultation bilaterally, no crackles or wheezing  CARDIOVASCULAR:  Normal apical impulse, regular rate and rhythm  ABDOMEN:  normal bowel sounds, soft, non-distended, non-tender, no masses palpated, no hepatosplenomegally    Extremities (lower extremity examination in detail)  Vascular: Dorsalis pedis and posterior tibial pulses are palpable bilaterally. Skin temperature is warm to warm from proximal tibial tuberosity to distal digits. CFT immediate to polly-wound sites. Edema controlled with ace compression. Dermatologic: Kindly refer to wound documentation for further details. Small hypergranular wound posterior aspect right heel, treated with silver nitrate cauterization, smaller in size. Left traumatic wound anterior ankle, improved in appearance and size. Exposed anterior tibial tendon and hardware appreciated. Wound characteristics overall vastly improved from previous examination. Neurovascular: epicritic and protopathic sensations grossly intact   Musculoskeletal: moderate pain with palpation/maniplution left lower extremity. Bilateral foot/ankle rectus in nature. Wound 10/22/18 Leg Left; Lower; Anterior #1 (Active)   Wound Image   11/5/2018  8:33 AM   Wound Type Wound 11/5/2018  8:33 AM   Dressing Status Changed 11/5/2018  8:33 AM   Dressing Changed Changed/New 11/5/2018  8:33 AM   Wound Cleansed Rinsed/Irrigated with saline 10/29/2018  9:37 AM   Wound Length (cm) 4 cm 11/5/2018  8:33 AM   Wound Width (cm) 5.4 cm 11/5/2018  8:33 AM   Wound Depth (cm)  0.1 11/5/2018  8:33 AM   Calculated Wound Size (cm^2) (l*w) 21.6 cm^2 11/5/2018  8:33 AM Dressing Status Intact 11/5/2018  8:33 AM   Dressing Changed Changed/New 11/5/2018  8:33 AM   Wound Cleansed Rinsed/Irrigated with saline 11/5/2018  8:33 AM   Wound Length (cm) 0.7 cm 11/5/2018  8:33 AM   Wound Width (cm) 0.3 cm 11/5/2018  8:33 AM   Wound Depth (cm)  scab 11/5/2018  8:33 AM   Calculated Wound Size (cm^2) (l*w) 0.21 cm^2 11/5/2018  8:33 AM   Change in Wound Size % (l*w) 47.5 11/5/2018  8:33 AM   Wound Assessment Brown 11/5/2018  8:33 AM   Drainage Amount None 11/5/2018  8:33 AM   Drainage Description Serosanguinous 10/22/2018 11:21 AM   Odor None 11/5/2018  8:33 AM   Margins Attached edges 11/5/2018  8:33 AM   Juli-wound Assessment Dry 11/5/2018  8:33 AM   Natchez%Wound Bed 50 10/29/2018  9:37 AM   Yellow%Wound Bed 50 10/29/2018  9:37 AM   Other%Wound Bed 100 brown 11/5/2018  8:33 AM   Op First Treatment Date 10/22/18 10/22/2018 11:21 AM   Number of days: 13       Wound 10/22/18 Heel Right #4 (Active)   Wound Image   11/5/2018  8:33 AM   Wound Type Wound 11/5/2018  8:33 AM   Dressing Status Intact; New drainage; Old drainage 11/5/2018  8:33 AM   Dressing Changed Changed/New 11/5/2018  8:33 AM   Wound Cleansed Rinsed/Irrigated with saline 11/5/2018  8:33 AM   Wound Length (cm) 0.2 cm 11/5/2018  8:33 AM   Wound Width (cm) 0.7 cm 11/5/2018  8:33 AM   Wound Depth (cm)  0.05 11/5/2018  8:33 AM   Calculated Wound Size (cm^2) (l*w) 0.14 cm^2 11/5/2018  8:33 AM   Change in Wound Size % (l*w) 93.27 11/5/2018  8:33 AM   Wound Assessment Hyper granulation tissue 11/5/2018  8:33 AM   Drainage Amount Small 11/5/2018  8:33 AM   Drainage Description Serosanguinous; Yellow 11/5/2018  8:33 AM   Odor None 11/5/2018  8:33 AM   Margins Attached edges 11/5/2018  8:33 AM   Juli-wound Assessment Dry 11/5/2018  8:33 AM   Natchez%Wound Bed 50 10/29/2018  9:37 AM   Red%Wound Bed 50 11/5/2018  8:33 AM   Yellow%Wound Bed 50 10/29/2018  9:37 AM   Other%Wound Bed 50 brown scab 11/5/2018  8:33 AM   Op First Treatment Date 10/22/18

## 2018-11-05 NOTE — PROGRESS NOTES
[facility]          Progress Note and Procedure Note      Dyan Bryant  MEDICAL RECORD NUMBER:  [id number]  AGE: 59 y.o. GENDER: male  : 1955  EPISODE DATE:  2018    Subjective:     Chief Complaint   Patient presents with    Wound Check     bilateral lower legs          HISTORY of PRESENT ILLNESS HPI     Dyan Bryant is a 59 y.o. male Established patient who presents today for wound/ulcer evaluation. History of Wound Context:  Dyan Bryant is a 59 y.o. male presenting for continued evaluation of a left lower extremity wound, s/p open tibial fracture with interval ORIF. Previous swab cultures indicates polymicrobial growth in the form of Strep and Enterbacter. Patient currently 2nd gen ceftin. Need for selective debridement of left lower extremity wound was indicated, tolerated well. Patient placed in a unna boot compression therapy. Plan for graft placement  . All further questions/concerns were otherwise addressed. Wound/Ulcer Pain Timing/Severity: moderate  Quality of pain: sharp, aching, throbbing  Severity:  6 / 10   Modifying Factors: Pain worsens with walking  Associated Signs/Symptoms: edema and drainage    Interval History:   Patient presents today for follow up on wound/ulcer's progression. The patient is currently on antibiotics.   Current dressing care includes unna boot to the left with aquacel ag, dry bandage to right thigh and DSD/ace wrap with boot to RLE          PAST MEDICAL HISTORY        Diagnosis Date    Embolism - blood clot     rt calf and groin    History of blood transfusion     Hyperlipidemia        PAST SURGICAL HISTORY    Past Surgical History:   Procedure Laterality Date    CARDIAC SURGERY      FRACTURE SURGERY      left lower leg--screws    HIP SURGERY      lt    JOINT REPLACEMENT      right    OR OFFICE/OUTPT VISIT,PROCEDURE ONLY Right 2018    OPEN REDUCTION INTERNAL FIXATION OF PILON FRACTURE WITH EXTERNAL FIXATOR AND WOUND VAC PLACEMENT, RIGHT CALCANEOUS OPEN REDUCTION INTERNAL FIXATION performed by Irasema Begum DPM at [facility] OFFICE/OUTPT [address] Left 9/4/2018    ORIF LEFT TIBIAL PILON, EX-FIX REMOVAL LEFT TIBIAL BONE MARROW HARVEST, PREPARATION OF GRAFT AND SITE, APPLICATION SKIN SUBSTITUTE GRAFT performed [facility] Irasema Begum DPM [facility]OUTPT VISIT,PROCEDURE ONLY Left 9/26/2018    LEFT ANKLE PREPARATION OF GRAFT SITE, HARVEST SPLIT THICKNESS SKIN GRAFT performed by Irasema Begum DPM at [address] HISTORY    Family History   Problem Relation Age of Onset    Cancer Mother         ovarian    Heart Disease Father     High Blood Pressure Father     Cancer Sister         breast       SOCIAL HISTORY    Social History   Substance Use Topics    Smoking status: Never Smoker    Smokeless tobacco: Never Used    Alcohol use No       ALLERGIES    Allergies   Allergen Reactions    Morphine Nausea And Vomiting     Profuse Vomiting       MEDICATIONS    Current Outpatient Prescriptions on File Prior to Encounter   Medication Sig Dispense Refill    cefUROXime (CEFTIN) 250 MG tablet Take 2 tablets by mouth 2 times daily for 14 days 56 tablet 0    HYDROcodone-acetaminophen (NORCO) 5-325 MG per tablet Take 2 tablets by mouth every 6 hours as needed for Pain. [facility] magnesium hydroxide (MILK OF MAGNESIA) 400 MG/5ML suspension Take 30 mLs by mouth 2 times daily      ferrous sulfate 325 (65 Fe) MG tablet Take 325 mg by mouth daily (with breakfast)      ondansetron (ZOFRAN) 4 MG tablet Take 8 mg by mouth every 8 hours as needed for Nausea or Vomiting       warfarin (COUMADIN) 7.5 MG tablet Take 5 mg by mouth       Atorvastatin Calcium (LIPITOR PO) Take 40 mg by mouth daily       chlorhexidine (HIBICLENS) 4 % external liquid Apply topically daily as needed. 1 Bottle 0     No current facility-administered medications on file prior to encounter.         REVIEW OF

## 2018-11-09 ENCOUNTER — ANESTHESIA (OUTPATIENT)
Dept: OPERATING ROOM | Age: 63
End: 2018-11-09
Payer: COMMERCIAL

## 2018-11-09 ENCOUNTER — ANESTHESIA EVENT (OUTPATIENT)
Dept: OPERATING ROOM | Age: 63
End: 2018-11-09
Payer: COMMERCIAL

## 2018-11-09 ENCOUNTER — HOSPITAL ENCOUNTER (OUTPATIENT)
Age: 63
Setting detail: OUTPATIENT SURGERY
Discharge: HOME | End: 2018-11-09
Attending: PODIATRIST | Admitting: PODIATRIST
Payer: COMMERCIAL

## 2018-11-09 VITALS
HEIGHT: 64 IN | OXYGEN SATURATION: 95 % | DIASTOLIC BLOOD PRESSURE: 60 MMHG | WEIGHT: 165.5 LBS | RESPIRATION RATE: 14 BRPM | SYSTOLIC BLOOD PRESSURE: 104 MMHG | HEART RATE: 64 BPM | TEMPERATURE: 97.1 F | BODY MASS INDEX: 28.25 KG/M2

## 2018-11-09 VITALS
DIASTOLIC BLOOD PRESSURE: 77 MMHG | RESPIRATION RATE: 2 BRPM | SYSTOLIC BLOOD PRESSURE: 162 MMHG | OXYGEN SATURATION: 93 % | TEMPERATURE: 96.8 F

## 2018-11-09 DIAGNOSIS — S82.252E TYPE I OR II OPEN DISPLACED COMMINUTED FRACTURE OF SHAFT OF LEFT TIBIA WITH ROUTINE HEALING, SUBSEQUENT ENCOUNTER: Primary | ICD-10-CM

## 2018-11-09 LAB — INR BLD: 1.57 (ref 0.85–1.13)

## 2018-11-09 PROCEDURE — 6360000002 HC RX W HCPCS: Performed by: ANESTHESIOLOGY

## 2018-11-09 PROCEDURE — 7100000011 HC PHASE II RECOVERY - ADDTL 15 MIN: Performed by: PODIATRIST

## 2018-11-09 PROCEDURE — 2780000010 HC IMPLANT OTHER: Performed by: PODIATRIST

## 2018-11-09 PROCEDURE — 36415 COLL VENOUS BLD VENIPUNCTURE: CPT

## 2018-11-09 PROCEDURE — 6370000000 HC RX 637 (ALT 250 FOR IP)

## 2018-11-09 PROCEDURE — 2580000003 HC RX 258: Performed by: PHYSICAL MEDICINE & REHABILITATION

## 2018-11-09 PROCEDURE — 2709999900 HC NON-CHARGEABLE SUPPLY: Performed by: PODIATRIST

## 2018-11-09 PROCEDURE — 7100000000 HC PACU RECOVERY - FIRST 15 MIN: Performed by: PODIATRIST

## 2018-11-09 PROCEDURE — 85610 PROTHROMBIN TIME: CPT

## 2018-11-09 PROCEDURE — 6360000002 HC RX W HCPCS: Performed by: STUDENT IN AN ORGANIZED HEALTH CARE EDUCATION/TRAINING PROGRAM

## 2018-11-09 PROCEDURE — 88304 TISSUE EXAM BY PATHOLOGIST: CPT

## 2018-11-09 PROCEDURE — 7100000010 HC PHASE II RECOVERY - FIRST 15 MIN: Performed by: PODIATRIST

## 2018-11-09 PROCEDURE — 3700000000 HC ANESTHESIA ATTENDED CARE: Performed by: PODIATRIST

## 2018-11-09 PROCEDURE — 3600000004 HC SURGERY LEVEL 4 BASE: Performed by: PODIATRIST

## 2018-11-09 PROCEDURE — 7100000001 HC PACU RECOVERY - ADDTL 15 MIN: Performed by: PODIATRIST

## 2018-11-09 PROCEDURE — 3600000014 HC SURGERY LEVEL 4 ADDTL 15MIN: Performed by: PODIATRIST

## 2018-11-09 PROCEDURE — 6360000002 HC RX W HCPCS: Performed by: NURSE ANESTHETIST, CERTIFIED REGISTERED

## 2018-11-09 PROCEDURE — 3700000001 HC ADD 15 MINUTES (ANESTHESIA): Performed by: PODIATRIST

## 2018-11-09 PROCEDURE — 2580000003 HC RX 258: Performed by: NURSE ANESTHETIST, CERTIFIED REGISTERED

## 2018-11-09 PROCEDURE — 6370000000 HC RX 637 (ALT 250 FOR IP): Performed by: NURSE ANESTHETIST, CERTIFIED REGISTERED

## 2018-11-09 PROCEDURE — 2500000003 HC RX 250 WO HCPCS: Performed by: NURSE ANESTHETIST, CERTIFIED REGISTERED

## 2018-11-09 DEVICE — INTEGRA® MATRIX WOUND DRESSING 2 IN X 2 IN (5 CM X 5 CM)
Type: IMPLANTABLE DEVICE | Site: FOOT | Status: FUNCTIONAL
Brand: INTEGRA® DERMAL REGENERATION TEMPLATE

## 2018-11-09 DEVICE — INTEGRA® MESHED BILAYER WOUND MATRIX 4 IN*5 IN (10 CM*12.5 CM)
Type: IMPLANTABLE DEVICE | Status: FUNCTIONAL
Brand: INTEGRA®

## 2018-11-09 RX ORDER — MEPERIDINE HYDROCHLORIDE 25 MG/ML
12.5 INJECTION INTRAMUSCULAR; INTRAVENOUS; SUBCUTANEOUS EVERY 5 MIN PRN
Status: DISCONTINUED | OUTPATIENT
Start: 2018-11-09 | End: 2018-11-09 | Stop reason: HOSPADM

## 2018-11-09 RX ORDER — FENTANYL CITRATE 50 UG/ML
INJECTION, SOLUTION INTRAMUSCULAR; INTRAVENOUS PRN
Status: DISCONTINUED | OUTPATIENT
Start: 2018-11-09 | End: 2018-11-09 | Stop reason: SDUPTHER

## 2018-11-09 RX ORDER — DEXAMETHASONE SODIUM PHOSPHATE 4 MG/ML
INJECTION, SOLUTION INTRA-ARTICULAR; INTRALESIONAL; INTRAMUSCULAR; INTRAVENOUS; SOFT TISSUE PRN
Status: DISCONTINUED | OUTPATIENT
Start: 2018-11-09 | End: 2018-11-09 | Stop reason: SDUPTHER

## 2018-11-09 RX ORDER — OXYCODONE HYDROCHLORIDE AND ACETAMINOPHEN 5; 325 MG/1; MG/1
2 TABLET ORAL EVERY 4 HOURS PRN
Status: DISCONTINUED | OUTPATIENT
Start: 2018-11-09 | End: 2018-11-09 | Stop reason: HOSPADM

## 2018-11-09 RX ORDER — FENTANYL CITRATE 50 UG/ML
50 INJECTION, SOLUTION INTRAMUSCULAR; INTRAVENOUS EVERY 5 MIN PRN
Status: DISCONTINUED | OUTPATIENT
Start: 2018-11-09 | End: 2018-11-09 | Stop reason: HOSPADM

## 2018-11-09 RX ORDER — ALBUTEROL SULFATE 90 UG/1
AEROSOL, METERED RESPIRATORY (INHALATION) PRN
Status: DISCONTINUED | OUTPATIENT
Start: 2018-11-09 | End: 2018-11-09 | Stop reason: SDUPTHER

## 2018-11-09 RX ORDER — LABETALOL HYDROCHLORIDE 5 MG/ML
5 INJECTION, SOLUTION INTRAVENOUS EVERY 10 MIN PRN
Status: DISCONTINUED | OUTPATIENT
Start: 2018-11-09 | End: 2018-11-09 | Stop reason: HOSPADM

## 2018-11-09 RX ORDER — PROMETHAZINE HYDROCHLORIDE 12.5 MG/1
12.5 TABLET ORAL EVERY 8 HOURS PRN
Qty: 40 TABLET | Refills: 0 | Status: SHIPPED | OUTPATIENT
Start: 2018-11-09 | End: 2018-11-16

## 2018-11-09 RX ORDER — ONDANSETRON 2 MG/ML
4 INJECTION INTRAMUSCULAR; INTRAVENOUS
Status: DISCONTINUED | OUTPATIENT
Start: 2018-11-09 | End: 2018-11-09 | Stop reason: HOSPADM

## 2018-11-09 RX ORDER — SODIUM CHLORIDE 0.9 % (FLUSH) 0.9 %
10 SYRINGE (ML) INJECTION PRN
Status: DISCONTINUED | OUTPATIENT
Start: 2018-11-09 | End: 2018-11-09 | Stop reason: HOSPADM

## 2018-11-09 RX ORDER — SODIUM CHLORIDE, SODIUM LACTATE, POTASSIUM CHLORIDE, CALCIUM CHLORIDE 600; 310; 30; 20 MG/100ML; MG/100ML; MG/100ML; MG/100ML
INJECTION, SOLUTION INTRAVENOUS CONTINUOUS PRN
Status: DISCONTINUED | OUTPATIENT
Start: 2018-11-09 | End: 2018-11-09 | Stop reason: SDUPTHER

## 2018-11-09 RX ORDER — PROPOFOL 10 MG/ML
INJECTION, EMULSION INTRAVENOUS PRN
Status: DISCONTINUED | OUTPATIENT
Start: 2018-11-09 | End: 2018-11-09 | Stop reason: SDUPTHER

## 2018-11-09 RX ORDER — SODIUM CHLORIDE 0.9 % (FLUSH) 0.9 %
10 SYRINGE (ML) INJECTION EVERY 12 HOURS SCHEDULED
Status: DISCONTINUED | OUTPATIENT
Start: 2018-11-09 | End: 2018-11-09 | Stop reason: HOSPADM

## 2018-11-09 RX ORDER — HYDROCODONE BITARTRATE AND ACETAMINOPHEN 5; 325 MG/1; MG/1
1 TABLET ORAL EVERY 4 HOURS PRN
Qty: 42 TABLET | Refills: 0 | Status: SHIPPED | OUTPATIENT
Start: 2018-11-09 | End: 2018-11-16

## 2018-11-09 RX ORDER — ONDANSETRON 2 MG/ML
INJECTION INTRAMUSCULAR; INTRAVENOUS PRN
Status: DISCONTINUED | OUTPATIENT
Start: 2018-11-09 | End: 2018-11-09 | Stop reason: SDUPTHER

## 2018-11-09 RX ORDER — HYDRALAZINE HYDROCHLORIDE 20 MG/ML
5 INJECTION INTRAMUSCULAR; INTRAVENOUS EVERY 10 MIN PRN
Status: DISCONTINUED | OUTPATIENT
Start: 2018-11-09 | End: 2018-11-09 | Stop reason: HOSPADM

## 2018-11-09 RX ORDER — SCOLOPAMINE TRANSDERMAL SYSTEM 1 MG/1
PATCH, EXTENDED RELEASE TRANSDERMAL
Status: DISCONTINUED
Start: 2018-11-09 | End: 2018-11-09 | Stop reason: HOSPADM

## 2018-11-09 RX ORDER — ACETAMINOPHEN 325 MG/1
650 TABLET ORAL EVERY 4 HOURS PRN
Status: DISCONTINUED | OUTPATIENT
Start: 2018-11-09 | End: 2018-11-09 | Stop reason: HOSPADM

## 2018-11-09 RX ORDER — MIDAZOLAM HYDROCHLORIDE 1 MG/ML
INJECTION INTRAMUSCULAR; INTRAVENOUS PRN
Status: DISCONTINUED | OUTPATIENT
Start: 2018-11-09 | End: 2018-11-09 | Stop reason: SDUPTHER

## 2018-11-09 RX ORDER — OXYCODONE HYDROCHLORIDE AND ACETAMINOPHEN 5; 325 MG/1; MG/1
1 TABLET ORAL EVERY 4 HOURS PRN
Status: DISCONTINUED | OUTPATIENT
Start: 2018-11-09 | End: 2018-11-09 | Stop reason: HOSPADM

## 2018-11-09 RX ORDER — PROMETHAZINE HYDROCHLORIDE 25 MG/ML
6.25 INJECTION, SOLUTION INTRAMUSCULAR; INTRAVENOUS
Status: DISCONTINUED | OUTPATIENT
Start: 2018-11-09 | End: 2018-11-09 | Stop reason: HOSPADM

## 2018-11-09 RX ORDER — FENTANYL CITRATE 50 UG/ML
25 INJECTION, SOLUTION INTRAMUSCULAR; INTRAVENOUS EVERY 5 MIN PRN
Status: DISCONTINUED | OUTPATIENT
Start: 2018-11-09 | End: 2018-11-09 | Stop reason: HOSPADM

## 2018-11-09 RX ORDER — SODIUM CHLORIDE 9 MG/ML
INJECTION, SOLUTION INTRAVENOUS CONTINUOUS
Status: DISCONTINUED | OUTPATIENT
Start: 2018-11-09 | End: 2018-11-09 | Stop reason: HOSPADM

## 2018-11-09 RX ORDER — SCOLOPAMINE TRANSDERMAL SYSTEM 1 MG/1
1 PATCH, EXTENDED RELEASE TRANSDERMAL ONCE
Status: DISCONTINUED | OUTPATIENT
Start: 2018-11-09 | End: 2018-11-09 | Stop reason: HOSPADM

## 2018-11-09 RX ORDER — LIDOCAINE HYDROCHLORIDE 20 MG/ML
INJECTION, SOLUTION INFILTRATION; PERINEURAL PRN
Status: DISCONTINUED | OUTPATIENT
Start: 2018-11-09 | End: 2018-11-09 | Stop reason: SDUPTHER

## 2018-11-09 RX ADMIN — ONDANSETRON HYDROCHLORIDE 4 MG: 4 INJECTION, SOLUTION INTRAMUSCULAR; INTRAVENOUS at 14:06

## 2018-11-09 RX ADMIN — FENTANYL CITRATE 50 MCG: 50 INJECTION INTRAMUSCULAR; INTRAVENOUS at 15:05

## 2018-11-09 RX ADMIN — ALBUTEROL SULFATE 4 PUFF: 90 AEROSOL, METERED RESPIRATORY (INHALATION) at 14:30

## 2018-11-09 RX ADMIN — FENTANYL CITRATE 50 MCG: 50 INJECTION INTRAMUSCULAR; INTRAVENOUS at 15:00

## 2018-11-09 RX ADMIN — SODIUM CHLORIDE, POTASSIUM CHLORIDE, SODIUM LACTATE AND CALCIUM CHLORIDE: 600; 310; 30; 20 INJECTION, SOLUTION INTRAVENOUS at 14:38

## 2018-11-09 RX ADMIN — FENTANYL CITRATE 100 MCG: 50 INJECTION INTRAMUSCULAR; INTRAVENOUS at 14:00

## 2018-11-09 RX ADMIN — DEXAMETHASONE SODIUM PHOSPHATE 8 MG: 4 INJECTION, SOLUTION INTRAMUSCULAR; INTRAVENOUS at 14:06

## 2018-11-09 RX ADMIN — PHENYLEPHRINE HYDROCHLORIDE 150 MCG: 10 INJECTION INTRAVENOUS at 14:07

## 2018-11-09 RX ADMIN — PROPOFOL 200 MG: 10 INJECTION, EMULSION INTRAVENOUS at 14:00

## 2018-11-09 RX ADMIN — PHENYLEPHRINE HYDROCHLORIDE 100 MCG: 10 INJECTION INTRAVENOUS at 14:24

## 2018-11-09 RX ADMIN — LIDOCAINE HYDROCHLORIDE 100 MG: 20 INJECTION, SOLUTION INFILTRATION; PERINEURAL at 14:00

## 2018-11-09 RX ADMIN — SODIUM CHLORIDE: 9 INJECTION, SOLUTION INTRAVENOUS at 11:11

## 2018-11-09 RX ADMIN — MIDAZOLAM HYDROCHLORIDE 2 MG: 1 INJECTION, SOLUTION INTRAMUSCULAR; INTRAVENOUS at 13:56

## 2018-11-09 RX ADMIN — ALBUTEROL SULFATE 4 PUFF: 90 AEROSOL, METERED RESPIRATORY (INHALATION) at 14:10

## 2018-11-09 RX ADMIN — Medication 2 G: at 14:04

## 2018-11-09 RX ADMIN — PHENYLEPHRINE HYDROCHLORIDE 150 MCG: 10 INJECTION INTRAVENOUS at 14:20

## 2018-11-09 ASSESSMENT — PULMONARY FUNCTION TESTS
PIF_VALUE: 11
PIF_VALUE: 14
PIF_VALUE: 11
PIF_VALUE: 17
PIF_VALUE: 4
PIF_VALUE: 1
PIF_VALUE: 11
PIF_VALUE: 3
PIF_VALUE: 0
PIF_VALUE: 9
PIF_VALUE: 14
PIF_VALUE: 3
PIF_VALUE: 11
PIF_VALUE: 14
PIF_VALUE: 10
PIF_VALUE: 8
PIF_VALUE: 3
PIF_VALUE: 11
PIF_VALUE: 10
PIF_VALUE: 11
PIF_VALUE: 0
PIF_VALUE: 20
PIF_VALUE: 12
PIF_VALUE: 0
PIF_VALUE: 4
PIF_VALUE: 11
PIF_VALUE: 11
PIF_VALUE: 2
PIF_VALUE: 14
PIF_VALUE: 11
PIF_VALUE: 10
PIF_VALUE: 11
PIF_VALUE: 14
PIF_VALUE: 11
PIF_VALUE: 1
PIF_VALUE: 11
PIF_VALUE: 1
PIF_VALUE: 9
PIF_VALUE: 11
PIF_VALUE: 9
PIF_VALUE: 1
PIF_VALUE: 4
PIF_VALUE: 0
PIF_VALUE: 4
PIF_VALUE: 0
PIF_VALUE: 11
PIF_VALUE: 11
PIF_VALUE: 2
PIF_VALUE: 11

## 2018-11-09 ASSESSMENT — PAIN DESCRIPTION - ORIENTATION
ORIENTATION: LEFT
ORIENTATION: LEFT

## 2018-11-09 ASSESSMENT — PAIN DESCRIPTION - DESCRIPTORS
DESCRIPTORS: DULL
DESCRIPTORS: ACHING

## 2018-11-09 ASSESSMENT — PAIN SCALES - GENERAL
PAINLEVEL_OUTOF10: 2
PAINLEVEL_OUTOF10: 5
PAINLEVEL_OUTOF10: 3
PAINLEVEL_OUTOF10: 2
PAINLEVEL_OUTOF10: 5

## 2018-11-09 ASSESSMENT — PAIN DESCRIPTION - LOCATION
LOCATION: ANKLE
LOCATION: FOOT
LOCATION: ANKLE

## 2018-11-09 ASSESSMENT — PAIN - FUNCTIONAL ASSESSMENT: PAIN_FUNCTIONAL_ASSESSMENT: 0-10

## 2018-11-09 ASSESSMENT — PAIN DESCRIPTION - PAIN TYPE
TYPE: SURGICAL PAIN

## 2018-11-09 NOTE — ANESTHESIA POSTPROCEDURE EVALUATION
Department of Anesthesiology  Postprocedure Note    Patient: Marco Antonio Riley  MRN: [id number]  YOB: 1955  Date of evaluation: 11/9/2018  Time:  3:45 PM     Procedure Summary     Date:  11/09/18 Room / Location:  [address] MARIFER [place]    Anesthesia Start:  1355 Anesthesia Stop:  4427    Procedure:  LEFT ANKLE PREPARATION OF GRAFT SITE, APPLICATION SKIN SUBSTITUTE GRAFT, APOPLICATION NPWT VAC (Left Foot) Diagnosis:  (LEFT SURGICAL WOUND DISRUPTION LEFT ANKLE)    Surgeon:  Irasema Begum DPM Responsible Provider:  Ysabel Person MD    Anesthesia Type:  general ASA Status:  2          Anesthesia Type: general    Bennett Phase I: Bennett Score: 10    Bennett Phase II:      Last vitals: Reviewed and per EMR flowsheets.        Anesthesia Post Evaluation    Patient location during evaluation: PACU  Patient participation: complete - patient participated  Level of consciousness: awake and alert  Airway patency: patent  Nausea & Vomiting: no nausea  Complications: no  Cardiovascular status: blood pressure returned to baseline and hemodynamically stable  Respiratory status: acceptable and spontaneous ventilation  Hydration status: euvolemic

## 2018-11-09 NOTE — PROGRESS NOTES
0 ARRIVED IN PACU FROM O R AFTER GENERAL ANESTHESIA. SEE FLOW SHEET   1500 FENTANY[address] GIVEN FOR POST OP PAIN  . SEE MAR.   1532 PAIN CONTROLLED . TRANSFERRED TO [facility] PER MATHEUS ORLANDO .  REPORT TO TERE ORLANDO

## 2018-11-09 NOTE — PROGRESS NOTES
Patient admitted to [facility] for wound washout, skin graft, and wound vac application consent signed.

## 2018-11-29 NOTE — PROGRESS NOTES
Telephone message left to notify office that H&H low at 9.1 and 28.4 from 9/9/18 and INR from 11/9/18 high at 1.57. Making Dr. Lili Knapp aware of last labs we have .  Thank you

## 2018-11-30 ENCOUNTER — ANESTHESIA (OUTPATIENT)
Dept: OPERATING ROOM | Age: 63
End: 2018-11-30
Payer: COMMERCIAL

## 2018-11-30 ENCOUNTER — HOSPITAL ENCOUNTER (OUTPATIENT)
Age: 63
Setting detail: OUTPATIENT SURGERY
Discharge: HOME | End: 2018-11-30
Attending: PODIATRIST | Admitting: PODIATRIST
Payer: COMMERCIAL

## 2018-11-30 ENCOUNTER — ANESTHESIA EVENT (OUTPATIENT)
Dept: OPERATING ROOM | Age: 63
End: 2018-11-30
Payer: COMMERCIAL

## 2018-11-30 VITALS
RESPIRATION RATE: 18 BRPM | HEART RATE: 69 BPM | WEIGHT: 165 LBS | BODY MASS INDEX: 28.17 KG/M2 | TEMPERATURE: 97.9 F | DIASTOLIC BLOOD PRESSURE: 79 MMHG | OXYGEN SATURATION: 97 % | HEIGHT: 64 IN | SYSTOLIC BLOOD PRESSURE: 153 MMHG

## 2018-11-30 VITALS
DIASTOLIC BLOOD PRESSURE: 56 MMHG | OXYGEN SATURATION: 100 % | SYSTOLIC BLOOD PRESSURE: 102 MMHG | RESPIRATION RATE: 3 BRPM | TEMPERATURE: 96.8 F

## 2018-11-30 PROCEDURE — 2580000003 HC RX 258: Performed by: PODIATRIST

## 2018-11-30 PROCEDURE — 6370000000 HC RX 637 (ALT 250 FOR IP)

## 2018-11-30 PROCEDURE — 7100000001 HC PACU RECOVERY - ADDTL 15 MIN: Performed by: PODIATRIST

## 2018-11-30 PROCEDURE — 7100000010 HC PHASE II RECOVERY - FIRST 15 MIN: Performed by: PODIATRIST

## 2018-11-30 PROCEDURE — 6360000002 HC RX W HCPCS: Performed by: NURSE ANESTHETIST, CERTIFIED REGISTERED

## 2018-11-30 PROCEDURE — 7100000000 HC PACU RECOVERY - FIRST 15 MIN: Performed by: PODIATRIST

## 2018-11-30 PROCEDURE — 7100000011 HC PHASE II RECOVERY - ADDTL 15 MIN: Performed by: PODIATRIST

## 2018-11-30 PROCEDURE — 6370000000 HC RX 637 (ALT 250 FOR IP): Performed by: PODIATRIST

## 2018-11-30 PROCEDURE — 6360000002 HC RX W HCPCS: Performed by: PODIATRIST

## 2018-11-30 PROCEDURE — 2709999900 HC NON-CHARGEABLE SUPPLY: Performed by: PODIATRIST

## 2018-11-30 PROCEDURE — 3700000001 HC ADD 15 MINUTES (ANESTHESIA): Performed by: PODIATRIST

## 2018-11-30 PROCEDURE — 2500000003 HC RX 250 WO HCPCS: Performed by: NURSE ANESTHETIST, CERTIFIED REGISTERED

## 2018-11-30 PROCEDURE — 3600000003 HC SURGERY LEVEL 3 BASE: Performed by: PODIATRIST

## 2018-11-30 PROCEDURE — 3700000000 HC ANESTHESIA ATTENDED CARE: Performed by: PODIATRIST

## 2018-11-30 PROCEDURE — 2500000003 HC RX 250 WO HCPCS: Performed by: PODIATRIST

## 2018-11-30 PROCEDURE — 3600000013 HC SURGERY LEVEL 3 ADDTL 15MIN: Performed by: PODIATRIST

## 2018-11-30 RX ORDER — ACETAMINOPHEN 325 MG/1
650 TABLET ORAL EVERY 4 HOURS PRN
Status: DISCONTINUED | OUTPATIENT
Start: 2018-11-30 | End: 2018-11-30 | Stop reason: HOSPADM

## 2018-11-30 RX ORDER — OXYCODONE HYDROCHLORIDE AND ACETAMINOPHEN 5; 325 MG/1; MG/1
1 TABLET ORAL EVERY 4 HOURS PRN
Status: DISCONTINUED | OUTPATIENT
Start: 2018-11-30 | End: 2018-11-30 | Stop reason: HOSPADM

## 2018-11-30 RX ORDER — BUPIVACAINE HYDROCHLORIDE AND EPINEPHRINE 5; 5 MG/ML; UG/ML
INJECTION, SOLUTION EPIDURAL; INTRACAUDAL; PERINEURAL PRN
Status: DISCONTINUED | OUTPATIENT
Start: 2018-11-30 | End: 2018-11-30 | Stop reason: HOSPADM

## 2018-11-30 RX ORDER — SODIUM CHLORIDE 0.9 % (FLUSH) 0.9 %
10 SYRINGE (ML) INJECTION EVERY 12 HOURS SCHEDULED
Status: DISCONTINUED | OUTPATIENT
Start: 2018-11-30 | End: 2018-11-30 | Stop reason: HOSPADM

## 2018-11-30 RX ORDER — HYDRALAZINE HYDROCHLORIDE 20 MG/ML
5 INJECTION INTRAMUSCULAR; INTRAVENOUS EVERY 10 MIN PRN
Status: DISCONTINUED | OUTPATIENT
Start: 2018-11-30 | End: 2018-11-30 | Stop reason: HOSPADM

## 2018-11-30 RX ORDER — MIDAZOLAM HYDROCHLORIDE 1 MG/ML
INJECTION INTRAMUSCULAR; INTRAVENOUS PRN
Status: DISCONTINUED | OUTPATIENT
Start: 2018-11-30 | End: 2018-11-30 | Stop reason: SDUPTHER

## 2018-11-30 RX ORDER — MORPHINE SULFATE 2 MG/ML
2 INJECTION, SOLUTION INTRAMUSCULAR; INTRAVENOUS EVERY 5 MIN PRN
Status: DISCONTINUED | OUTPATIENT
Start: 2018-11-30 | End: 2018-11-30 | Stop reason: HOSPADM

## 2018-11-30 RX ORDER — MEPERIDINE HYDROCHLORIDE 25 MG/ML
12.5 INJECTION INTRAMUSCULAR; INTRAVENOUS; SUBCUTANEOUS EVERY 5 MIN PRN
Status: DISCONTINUED | OUTPATIENT
Start: 2018-11-30 | End: 2018-11-30 | Stop reason: HOSPADM

## 2018-11-30 RX ORDER — DIPHENHYDRAMINE HYDROCHLORIDE 50 MG/ML
12.5 INJECTION INTRAMUSCULAR; INTRAVENOUS
Status: DISCONTINUED | OUTPATIENT
Start: 2018-11-30 | End: 2018-11-30 | Stop reason: HOSPADM

## 2018-11-30 RX ORDER — ONDANSETRON 2 MG/ML
INJECTION INTRAMUSCULAR; INTRAVENOUS PRN
Status: DISCONTINUED | OUTPATIENT
Start: 2018-11-30 | End: 2018-11-30 | Stop reason: SDUPTHER

## 2018-11-30 RX ORDER — LABETALOL HYDROCHLORIDE 5 MG/ML
5 INJECTION, SOLUTION INTRAVENOUS EVERY 5 MIN PRN
Status: DISCONTINUED | OUTPATIENT
Start: 2018-11-30 | End: 2018-11-30 | Stop reason: HOSPADM

## 2018-11-30 RX ORDER — CEFUROXIME AXETIL 250 MG/1
500 TABLET ORAL 2 TIMES DAILY
COMMUNITY

## 2018-11-30 RX ORDER — SODIUM CHLORIDE 0.9 % (FLUSH) 0.9 %
10 SYRINGE (ML) INJECTION PRN
Status: DISCONTINUED | OUTPATIENT
Start: 2018-11-30 | End: 2018-11-30 | Stop reason: HOSPADM

## 2018-11-30 RX ORDER — FENTANYL CITRATE 50 UG/ML
INJECTION, SOLUTION INTRAMUSCULAR; INTRAVENOUS PRN
Status: DISCONTINUED | OUTPATIENT
Start: 2018-11-30 | End: 2018-11-30 | Stop reason: SDUPTHER

## 2018-11-30 RX ORDER — TRAZODONE HYDROCHLORIDE 300 MG/1
100 TABLET ORAL NIGHTLY
COMMUNITY

## 2018-11-30 RX ORDER — ONDANSETRON 2 MG/ML
4 INJECTION INTRAMUSCULAR; INTRAVENOUS
Status: DISCONTINUED | OUTPATIENT
Start: 2018-11-30 | End: 2018-11-30 | Stop reason: HOSPADM

## 2018-11-30 RX ORDER — SODIUM CHLORIDE 9 MG/ML
INJECTION, SOLUTION INTRAVENOUS CONTINUOUS
Status: DISCONTINUED | OUTPATIENT
Start: 2018-11-30 | End: 2018-11-30 | Stop reason: HOSPADM

## 2018-11-30 RX ORDER — SCOLOPAMINE TRANSDERMAL SYSTEM 1 MG/1
1 PATCH, EXTENDED RELEASE TRANSDERMAL ONCE
Status: DISCONTINUED | OUTPATIENT
Start: 2018-11-30 | End: 2018-11-30 | Stop reason: HOSPADM

## 2018-11-30 RX ORDER — MINERAL OIL
OIL (ML) MISCELLANEOUS PRN
Status: DISCONTINUED | OUTPATIENT
Start: 2018-11-30 | End: 2018-11-30 | Stop reason: HOSPADM

## 2018-11-30 RX ORDER — OXYCODONE HYDROCHLORIDE AND ACETAMINOPHEN 5; 325 MG/1; MG/1
2 TABLET ORAL EVERY 4 HOURS PRN
Status: DISCONTINUED | OUTPATIENT
Start: 2018-11-30 | End: 2018-11-30 | Stop reason: HOSPADM

## 2018-11-30 RX ORDER — PROPOFOL 10 MG/ML
INJECTION, EMULSION INTRAVENOUS PRN
Status: DISCONTINUED | OUTPATIENT
Start: 2018-11-30 | End: 2018-11-30 | Stop reason: SDUPTHER

## 2018-11-30 RX ORDER — SCOLOPAMINE TRANSDERMAL SYSTEM 1 MG/1
PATCH, EXTENDED RELEASE TRANSDERMAL
Status: COMPLETED
Start: 2018-11-30 | End: 2018-11-30

## 2018-11-30 RX ORDER — DEXAMETHASONE SODIUM PHOSPHATE 4 MG/ML
INJECTION, SOLUTION INTRA-ARTICULAR; INTRALESIONAL; INTRAMUSCULAR; INTRAVENOUS; SOFT TISSUE PRN
Status: DISCONTINUED | OUTPATIENT
Start: 2018-11-30 | End: 2018-11-30 | Stop reason: SDUPTHER

## 2018-11-30 RX ORDER — LIDOCAINE HYDROCHLORIDE 20 MG/ML
INJECTION, SOLUTION INFILTRATION; PERINEURAL PRN
Status: DISCONTINUED | OUTPATIENT
Start: 2018-11-30 | End: 2018-11-30 | Stop reason: SDUPTHER

## 2018-11-30 RX ORDER — FENTANYL CITRATE 50 UG/ML
50 INJECTION, SOLUTION INTRAMUSCULAR; INTRAVENOUS EVERY 5 MIN PRN
Status: DISCONTINUED | OUTPATIENT
Start: 2018-11-30 | End: 2018-11-30 | Stop reason: HOSPADM

## 2018-11-30 RX ADMIN — PHENYLEPHRINE HYDROCHLORIDE 100 MCG: 10 INJECTION INTRAVENOUS at 16:08

## 2018-11-30 RX ADMIN — SODIUM CHLORIDE: 9 INJECTION, SOLUTION INTRAVENOUS at 16:36

## 2018-11-30 RX ADMIN — SODIUM CHLORIDE: 9 INJECTION, SOLUTION INTRAVENOUS at 12:22

## 2018-11-30 RX ADMIN — DEXAMETHASONE SODIUM PHOSPHATE 4 MG: 4 INJECTION, SOLUTION INTRAMUSCULAR; INTRAVENOUS at 15:54

## 2018-11-30 RX ADMIN — FENTANYL CITRATE 50 MCG: 50 INJECTION INTRAMUSCULAR; INTRAVENOUS at 15:49

## 2018-11-30 RX ADMIN — PROPOFOL 200 MG: 10 INJECTION, EMULSION INTRAVENOUS at 15:46

## 2018-11-30 RX ADMIN — PHENYLEPHRINE HYDROCHLORIDE 100 MCG: 10 INJECTION INTRAVENOUS at 16:06

## 2018-11-30 RX ADMIN — ONDANSETRON HYDROCHLORIDE 4 MG: 4 INJECTION, SOLUTION INTRAMUSCULAR; INTRAVENOUS at 15:54

## 2018-11-30 RX ADMIN — PROPOFOL 500 MG: 10 INJECTION, EMULSION INTRAVENOUS at 15:48

## 2018-11-30 RX ADMIN — MIDAZOLAM HYDROCHLORIDE 2 MG: 1 INJECTION, SOLUTION INTRAMUSCULAR; INTRAVENOUS at 15:41

## 2018-11-30 RX ADMIN — LIDOCAINE HYDROCHLORIDE 100 MG: 20 INJECTION, SOLUTION INFILTRATION; PERINEURAL at 15:46

## 2018-11-30 RX ADMIN — FENTANYL CITRATE 50 MCG: 50 INJECTION INTRAMUSCULAR; INTRAVENOUS at 15:46

## 2018-11-30 RX ADMIN — Medication 2 G: at 15:53

## 2018-11-30 ASSESSMENT — PULMONARY FUNCTION TESTS
PIF_VALUE: 5
PIF_VALUE: 1
PIF_VALUE: 3
PIF_VALUE: 2
PIF_VALUE: 10
PIF_VALUE: 2
PIF_VALUE: 19
PIF_VALUE: 14
PIF_VALUE: 3
PIF_VALUE: 2
PIF_VALUE: 3
PIF_VALUE: 2
PIF_VALUE: 5
PIF_VALUE: 5
PIF_VALUE: 2
PIF_VALUE: 2
PIF_VALUE: 3
PIF_VALUE: 3
PIF_VALUE: 2
PIF_VALUE: 2
PIF_VALUE: 3
PIF_VALUE: 1
PIF_VALUE: 3
PIF_VALUE: 5
PIF_VALUE: 2
PIF_VALUE: 3
PIF_VALUE: 2
PIF_VALUE: 3
PIF_VALUE: 2
PIF_VALUE: 2
PIF_VALUE: 5
PIF_VALUE: 6
PIF_VALUE: 2
PIF_VALUE: 2
PIF_VALUE: 5
PIF_VALUE: 2
PIF_VALUE: 3
PIF_VALUE: 4
PIF_VALUE: 5
PIF_VALUE: 3
PIF_VALUE: 3
PIF_VALUE: 5
PIF_VALUE: 2
PIF_VALUE: 5
PIF_VALUE: 3
PIF_VALUE: 17
PIF_VALUE: 2
PIF_VALUE: 3
PIF_VALUE: 3

## 2018-11-30 ASSESSMENT — PAIN SCALES - GENERAL
PAINLEVEL_OUTOF10: 0

## 2018-11-30 NOTE — ANESTHESIA PRE PROCEDURE
HCG (If Applicable): No results found for: PREGTESTUR, PREGSERUM, HCG, HCGQUANT     ABGs: No results found for: PHART, PO2ART, XFK0ZXW, GET3TRR, BEART, N5SEFARC     Type & Screen (If Applicable):  Lab Results   Component Value Date    LABRH POS 09/06/2018       Anesthesia Evaluation    Airway: Mallampati: II       Mouth opening: > = 3 FB Dental:          Pulmonary:       (-) COPD                           Cardiovascular:        (-) hypertension      Rhythm: regular                      Neuro/Psych:               GI/Hepatic/Renal:             Endo/Other:                     Abdominal:           Vascular:                                        Anesthesia Plan      general     ASA 2       Induction: intravenous. Anesthetic plan and risks discussed with patient. Plan discussed with CRNA.                   Scott Molina MD   11/30/2018

## 2020-12-01 ENCOUNTER — HOSPITAL ENCOUNTER (OUTPATIENT)
Age: 65
Discharge: HOME | End: 2020-12-01
Payer: COMMERCIAL

## 2020-12-01 LAB
EKG ATRIAL RATE: 50 BPM
EKG P AXIS: 72 DEGREES
EKG P-R INTERVAL: 138 MS
EKG Q-T INTERVAL: 450 MS
EKG QRS DURATION: 102 MS
EKG QTC CALCULATION (BAZETT): 410 MS
EKG R AXIS: 30 DEGREES
EKG T AXIS: 39 DEGREES
EKG VENTRICULAR RATE: 50 BPM

## 2020-12-01 PROCEDURE — 93005 ELECTROCARDIOGRAM TRACING: CPT | Performed by: FAMILY MEDICINE

## 2020-12-01 PROCEDURE — 93010 ELECTROCARDIOGRAM REPORT: CPT | Performed by: INTERNAL MEDICINE

## (undated) DEVICE — CONTAINER,SPECIMEN,PNEU TUBE,4OZ,OR STRL: Brand: MEDLINE

## (undated) DEVICE — BANDAGE COMPR E SGL LAYERED CLSR BGE W/ CLP W4INXL15FT

## (undated) DEVICE — SPONGE GZ W4XL4IN COT 12 PLY TYP VII WVN C FLD DSGN

## (undated) DEVICE — 1010 S-DRAPE TOWEL DRAPE 10/BX: Brand: STERI-DRAPE™

## (undated) DEVICE — GOWN,SIRUS,NON REINFRCD,LARGE,SET IN SL: Brand: MEDLINE

## (undated) DEVICE — BASIC SINGLE BASIN BTC-LF: Brand: MEDLINE INDUSTRIES, INC.

## (undated) DEVICE — SPONGE LAP W18XL18IN WHT COT 4 PLY FLD STRUNG RADPQ DISP ST

## (undated) DEVICE — GLOVE ORANGE PI 7   MSG9070

## (undated) DEVICE — PAD DSG NEG PRSS W/ TBNG CLMP CONN SENSATRAC VAC

## (undated) DEVICE — GUIDE PIN BAYONET POINT 1.3MM X 140MM

## (undated) DEVICE — SOLUTION IV 1000ML 0.9% SOD CHL PH 5 INJ USP VIAFLX PLAS

## (undated) DEVICE — PACK PROCEDURE SURG ORTH BASIC SRHP LF

## (undated) DEVICE — 2.5MM PROVISIONAL FIXATION PIN - 14MM: Brand: EVOS

## (undated) DEVICE — GLOVE ORANGE PI 7 1/2   MSG9075

## (undated) DEVICE — EVOS SMALL 2.5MM DRILL W/AO QC SHORT: Brand: EVOS

## (undated) DEVICE — KIT EVAC 400CC PVC RADPQ Y CONN

## (undated) DEVICE — BANDAGE,GAUZE,4.5"X4.1YD,STERILE,LF: Brand: MEDLINE

## (undated) DEVICE — ZIMMER SKIN GRAFT CARRIER 8 INCH LENGTH: Brand: DERMACARRIERS

## (undated) DEVICE — GAUZE,SPONGE,4"X4",12PLY,STERILE,LF,2'S: Brand: MEDLINE

## (undated) DEVICE — VERSAJET II HYDROSURGERY SYSTEM HANDSET, 45DEG 14MM EXACT: Brand: VERSAJET

## (undated) DEVICE — SYRINGE IRRIG 60ML SFT PLIABLE BLB EZ TO GRP 1 HND USE W/

## (undated) DEVICE — DRAPE NEG PRSS W30.5XL26CM POLYUR ADH VAC

## (undated) DEVICE — TUBING, SUCTION, 1/4" X 20', STRAIGHT: Brand: MEDLINE INDUSTRIES, INC.

## (undated) DEVICE — GLOVE ORANGE PI 8   MSG9080

## (undated) DEVICE — PADDING CAST W6INXL4YD COT LO LINTING WYTEX

## (undated) DEVICE — C-ARMOR C-ARM EQUIPMENT COVERS CLEAR STERILE UNIVERSAL FIT 12 PER CASE: Brand: C-ARMOR

## (undated) DEVICE — EVOS SMALL 2.5MM DRILL W/AO QC LONG: Brand: EVOS

## (undated) DEVICE — DRAPE,U/SHT,SPLIT,FILM,60X84,STERILE: Brand: MEDLINE

## (undated) DEVICE — CRADLE POS 3X5X24IN RASPBERRY ARM PRONE FOAM DISP

## (undated) DEVICE — JET-X FREEDOM CLAMP 10.5 MM TO 5MM: Brand: JET-X

## (undated) DEVICE — JET-X 6 HOLE PIN CLAMP: Brand: JET-X

## (undated) DEVICE — 3M™ STERI-STRIP™ COMPOUND BENZOIN TINCTURE 40 BAGS/CARTON 4 CARTONS/CASE C1544: Brand: 3M™ STERI-STRIP™

## (undated) DEVICE — Device

## (undated) DEVICE — TETRA-FLEX CF WOVEN LATEX FREE ELASTIC BANDAGE 6" X 5.5 YD: Brand: TETRA-FLEX™CF

## (undated) DEVICE — JET-X L-BAR: Brand: JET-X

## (undated) DEVICE — DRESSING WND VAC MED GRANUFOAM SENSATRAC

## (undated) DEVICE — IMPREGNATED GAUZE DRESSING: Brand: CUTICERIN 7.5X20CM CTN 50

## (undated) DEVICE — CANISTER NEG PRSS 1000ML W/ GEL INFOVAC

## (undated) DEVICE — DRESSING,GAUZE,XEROFORM,CURAD,5"X9",ST: Brand: CURAD

## (undated) DEVICE — SOLUTION SURG PREP POV IOD 7.5% 4 OZ

## (undated) DEVICE — BANDAGE COMPR W6INXL12FT SMOOTH FOR LIMB EXSANG ESMARCH

## (undated) DEVICE — Z DISCONTINUED BY MEDLINE USE 2711682 TRAY SKIN PREP DRY W/ PREM GLV

## (undated) DEVICE — 1-PIECE DRAINABLE OSTOMY POUCH, CERAPLUS: Brand: PREMIER

## (undated) DEVICE — OSTOMY POUCH CLAMP: Brand: HOLLISTER

## (undated) DEVICE — SOLUTION IV 1000ML LAC RINGERS PH 6.5 INJ USP VIAFLX PLAS

## (undated) DEVICE — LINER SUCT CANSTR 1500CC SEMI RIG W/ POR HYDROPHOBIC SHUT

## (undated) DEVICE — BANDAGE COMPR W4INXL12FT E DISP ESMARCH EVEN

## (undated) DEVICE — ROYAL SILK SURGICAL GOWN, XXL: Brand: CONVERTORS

## (undated) DEVICE — 3M™ WARMING BLANKET, UPPER BODY, 10 PER CASE, 42268: Brand: BAIR HUGGER™

## (undated) DEVICE — FAN SPRAY KIT: Brand: PULSAVAC®

## (undated) DEVICE — 3M™ TRANSPORE™ WHITE SURGICAL TAPE 1534-2, 2 INCH X 10 YARD (5CM X 9,1M), 6 ROLLS/CARTON 10 CARTONS/CASE: Brand: 3M™ TRANSPORE™

## (undated) DEVICE — JET-X QUICK CLAMP 10.5MM TO 10.5MM: Brand: JET-X

## (undated) DEVICE — SOLUTION IV IRRIG POUR BRL 0.9% SODIUM CHL 2F7124

## (undated) DEVICE — 2.5MM PROVISIONAL FIXATION PIN - 40MM: Brand: EVOS

## (undated) DEVICE — GAUZE,SPONGE,8"X4",12PLY,XRAY,STRL,LF: Brand: MEDLINE

## (undated) DEVICE — 2.0MM PROVISIONAL FIXATION PIN - 25MM: Brand: EVOS

## (undated) DEVICE — INTENDED FOR TISSUE SEPARATION, AND OTHER PROCEDURES THAT REQUIRE A SHARP SURGICAL BLADE TO PUNCTURE OR CUT.: Brand: BARD-PARKER ® CARBON RIB-BACK BLADES

## (undated) DEVICE — COVER ARMBRD W13XL28.5IN IMPERV BLU FOR OP RM

## (undated) DEVICE — SUREFIT, DUAL DISPERSIVE ELECTRODE, CONTACT QUALITY MONITOR: Brand: SUREFIT

## (undated) DEVICE — DRESSING NEG PRSS L W15XH3.3XL26CM BLK POLYUR DRP PD

## (undated) DEVICE — JET-X DRILL FOR SHORT 5MM HALF PIN: Brand: JET-X

## (undated) DEVICE — 2.7MM CANNULATED REAMER F/ 4.0MM                                    CANNULATED SCREWS: Brand: CANNULATED SCREWS

## (undated) DEVICE — SOLUTION SCRB 4OZ 4% CHG H2O AIDED FOR PREOPERATIVE SKIN

## (undated) DEVICE — AGENT HEMSTAT W2XL14IN OXIDIZED REGENERATED CELOS ABSRB FOR

## (undated) DEVICE — GOWN,SIRUS,NONRNF,SETINSLV,XL,20/CS: Brand: MEDLINE

## (undated) DEVICE — GLOVE SURG SZ 65 THK91MIL LTX FREE SYN POLYISOPRENE

## (undated) DEVICE — PAD,ABDOMINAL,5"X9",ST,LF,25/BX: Brand: MEDLINE INDUSTRIES, INC.

## (undated) DEVICE — BANDAGE COMPR M W4INXL10YD WHT BGE VELC E MTRX HK AND LOOP

## (undated) DEVICE — SUTURE MCRYL SZ 3-0 L27IN ABSRB UD L24MM PS-1 3/8 CIR PRIM Y936H

## (undated) DEVICE — 2.0MM PROVISIONAL FIXATION PIN 30MM: Brand: EVOS

## (undated) DEVICE — SOLUTION IV IRRIG WATER 1000ML POUR BRL 2F7114

## (undated) DEVICE — TOWEL,OR,DSP,ST,BLUE,DLX,4/PK,20PK/CS: Brand: MEDLINE

## (undated) DEVICE — 3M™ COBAN™ NL STERILE NON-LATEX SELF-ADHERENT WRAP, 2084S, 4 IN X 5 YD (10 CM X 4,5 M), 18 ROLLS/CASE: Brand: 3M™ COBAN™

## (undated) DEVICE — HEAD POSITIONER, SURGICAL: Brand: DEROYAL

## (undated) DEVICE — SYRINGE MED 10ML LUERLOCK TIP W/O SFTY DISP

## (undated) DEVICE — BANDAGE COMPR M W6INXL10YD WHT BGE VELC E MTRX HK AND LOOP

## (undated) DEVICE — 4-PORT MANIFOLD: Brand: NEPTUNE 2

## (undated) DEVICE — 2.5MM PROVISIONAL FIXATION PIN - 25MM: Brand: EVOS

## (undated) DEVICE — CATHETER,URETHRAL,REDRUBBER,STRL,18FR: Brand: MEDLINE

## (undated) DEVICE — PADDING UNDERCAST W6INXL4YD RAYON POLY SYN NONADHESIVE

## (undated) DEVICE — TIN 5MM X 35MM SHORT HALF PIN: Brand: JET-X

## (undated) DEVICE — SPLINT ORTH W4XL30IN WHT FBRGLS 1 SIDE FELT PD CNFRM LO

## (undated) DEVICE — PADDING UNDERCAST W4INXL12FT RAYON POLY SYN NONADHESIVE

## (undated) DEVICE — PREP SOL PVP IODINE 4%  4 OZ/BTL

## (undated) DEVICE — DRAPE C ARM W36XL30IN RECTANG BND BG AND TAPE

## (undated) DEVICE — YANKAUER,BULB TIP,W/O VENT,RIGID,STERILE: Brand: MEDLINE

## (undated) DEVICE — DRAIN SURG W10MMXL20CM SIL FLAT HUBLESS W/ RADPQ STRP 3/4

## (undated) DEVICE — EVOS SMALL 2.0MM DRILL W/AO QC LONG: Brand: EVOS

## (undated) DEVICE — KIT HEMTLGY CONC SYS CBMA MAR0 MAX QUICKDRAW W/ LIGHTNING

## (undated) DEVICE — SPLINT CAST W4XL30IN WHT THMB FBRGLS PRECUT INTLOK WRINKLE

## (undated) DEVICE — CATHETER URETH RND TIP 20 FRX16 IN RADIOPAQUE DOVER

## (undated) DEVICE — PADDING,UNDERCAST,COTTON, 4"X4YD STERILE: Brand: MEDLINE

## (undated) DEVICE — 600 MM BAR: Brand: JET-X

## (undated) DEVICE — DRAPE, EXTREMITY, BILATERAL, STERILE: Brand: MEDLINE

## (undated) DEVICE — IMPREGNATED GAUZE DRESSING: Brand: CUTICERIN 7.5X7.5CM CTN 50

## (undated) DEVICE — STOCKINETTE ORTH W9XL36IN COT 2 PLY HLLW FOR HANDLING LMB

## (undated) DEVICE — SPLINT CAST W5XL45IN WHT THMB FBRGLS PRECUT INTLOK WRINKLE

## (undated) DEVICE — 3M™ TRANSPORE™ WHITE SURGICAL TAPE 1534-1, 1 INCH X 10 YARD (2,5CM X 9,1M), 12 ROLLS/CARTON 10 CARTONS/CASE: Brand: 3M™ TRANSPORE™

## (undated) DEVICE — BLADE CLIPPER GEN PURP NS

## (undated) DEVICE — DRAIN SURG 10FR PVC TB W/ TRCR MID PERF NO RESVR HUBLESS

## (undated) DEVICE — SUTURE VCRL SZ 2-0 L27IN ABSRB UD L26MM SH 1/2 CIR J417H

## (undated) DEVICE — DERMATOME BLADES: Brand: DERMATOME

## (undated) DEVICE — CLOSURE SKIN FLX NONINVASIVE PRELOC TECHNOLOGY FOR 24IN